# Patient Record
Sex: FEMALE | Race: WHITE | NOT HISPANIC OR LATINO | Employment: FULL TIME | URBAN - METROPOLITAN AREA
[De-identification: names, ages, dates, MRNs, and addresses within clinical notes are randomized per-mention and may not be internally consistent; named-entity substitution may affect disease eponyms.]

---

## 2017-09-25 ENCOUNTER — HOSPITAL ENCOUNTER (EMERGENCY)
Facility: HOSPITAL | Age: 27
Discharge: HOME | End: 2017-09-25
Attending: EMERGENCY MEDICINE | Admitting: EMERGENCY MEDICINE

## 2017-09-25 ENCOUNTER — APPOINTMENT (OUTPATIENT)
Dept: GENERAL RADIOLOGY | Facility: HOSPITAL | Age: 27
End: 2017-09-25

## 2017-09-25 VITALS
HEIGHT: 68 IN | RESPIRATION RATE: 18 BRPM | SYSTOLIC BLOOD PRESSURE: 131 MMHG | DIASTOLIC BLOOD PRESSURE: 79 MMHG | BODY MASS INDEX: 28.79 KG/M2 | WEIGHT: 190 LBS | HEART RATE: 89 BPM | OXYGEN SATURATION: 97 % | TEMPERATURE: 98.2 F

## 2017-09-25 DIAGNOSIS — M72.2 PLANTAR FASCIITIS OF LEFT FOOT: Primary | ICD-10-CM

## 2017-09-25 DIAGNOSIS — M77.50 TENDINITIS OF ANKLE OR FOOT: ICD-10-CM

## 2017-09-25 PROCEDURE — 73630 X-RAY EXAM OF FOOT: CPT

## 2017-09-25 PROCEDURE — 73610 X-RAY EXAM OF ANKLE: CPT

## 2017-09-25 PROCEDURE — 99283 EMERGENCY DEPT VISIT LOW MDM: CPT

## 2017-09-25 RX ORDER — DICLOFENAC POTASSIUM 50 MG/1
50 TABLET, FILM COATED ORAL 3 TIMES DAILY
Qty: 15 TABLET | Refills: 0 | Status: SHIPPED | OUTPATIENT
Start: 2017-09-25 | End: 2019-08-02

## 2017-09-25 NOTE — ED PROVIDER NOTES
Subjective   Patient is a 27 y.o. female presenting with lower extremity pain.   History provided by:  Patient   used: No    Lower Extremity Issue   Location:  Foot and ankle  Lower extremity injury: months ago     Ankle location:  L ankle  Foot location:  L foot and dorsum of L foot  Pain details:     Quality:  Sharp and shooting    Radiates to:  Does not radiate    Severity:  Moderate    Onset quality:  Gradual    Timing:  Intermittent    Progression:  Waxing and waning  Chronicity:  Chronic  Dislocation: no    Tetanus status:  Up to date  Prior injury to area:  Yes  Relieved by:  Compression and rest  Exacerbated by: pain worse when first up and then loosens up.  Associated symptoms: decreased ROM, stiffness and swelling    Associated symptoms: no back pain, no fever, no neck pain and no numbness        Review of Systems   Constitutional: Negative for chills and fever.   Respiratory: Negative for chest tightness, shortness of breath and wheezing.    Cardiovascular: Negative for chest pain and palpitations.   Musculoskeletal: Positive for stiffness. Negative for back pain and neck pain.   Skin: Negative for pallor and rash.   Psychiatric/Behavioral: Negative.    All other systems reviewed and are negative.      History reviewed. No pertinent past medical history.    No Known Allergies    Past Surgical History:   Procedure Laterality Date   • DENTAL PROCEDURE         History reviewed. No pertinent family history.    Social History     Social History   • Marital status: Single     Spouse name: N/A   • Number of children: N/A   • Years of education: N/A     Social History Main Topics   • Smoking status: Never Smoker   • Smokeless tobacco: Never Used   • Alcohol use No   • Drug use: No   • Sexual activity: Not Asked     Other Topics Concern   • None     Social History Narrative   • None           Objective   Physical Exam   Constitutional: She is oriented to person, place, and time. She appears  well-developed and well-nourished.   HENT:   Head: Normocephalic and atraumatic.   Musculoskeletal:   Tender over the plantar fascia.  Also tender over the dorsal aspect of the foot and the medial ankle.  No ecchymosis redness or abrasions.   Neurological: She is alert and oriented to person, place, and time. She has normal reflexes.   Skin: Skin is warm and dry.   Psychiatric: She has a normal mood and affect. Her behavior is normal. Judgment and thought content normal.   Nursing note and vitals reviewed.      Procedures         ED Course  ED Course                  MDM  Number of Diagnoses or Management Options  new and requires workup  new and requires workup     Amount and/or Complexity of Data Reviewed  Tests in the radiology section of CPT®: reviewed and ordered  Discuss the patient with other providers: yes  Independent visualization of images, tracings, or specimens: yes    Patient Progress  Patient progress: stable      Final diagnoses:   Plantar fasciitis of left foot   Tendinitis of ankle or foot            HTI Haque  09/25/17 [id number]

## 2018-07-23 PROBLEM — M25.511 ACUTE PAIN OF RIGHT SHOULDER: Status: ACTIVE | Noted: 2018-07-23

## 2018-07-23 PROBLEM — S62.639B OPEN FRACTURE OF TUFT OF DISTAL PHALANX OF FINGER: Status: ACTIVE | Noted: 2018-07-23

## 2019-08-02 ENCOUNTER — OFFICE VISIT (OUTPATIENT)
Dept: GASTROENTEROLOGY | Facility: CLINIC | Age: 29
End: 2019-08-02

## 2019-08-02 VITALS
SYSTOLIC BLOOD PRESSURE: 110 MMHG | DIASTOLIC BLOOD PRESSURE: 78 MMHG | HEIGHT: 68 IN | OXYGEN SATURATION: 99 % | BODY MASS INDEX: 41.22 KG/M2 | RESPIRATION RATE: 16 BRPM | HEART RATE: 103 BPM | TEMPERATURE: 98.1 F | WEIGHT: 272 LBS

## 2019-08-02 DIAGNOSIS — K29.50 OTHER CHRONIC GASTRITIS WITHOUT HEMORRHAGE: ICD-10-CM

## 2019-08-02 DIAGNOSIS — Z79.1 ENCOUNTER FOR LONG-TERM (CURRENT) USE OF NSAIDS: ICD-10-CM

## 2019-08-02 DIAGNOSIS — R10.12 LUQ PAIN: ICD-10-CM

## 2019-08-02 DIAGNOSIS — E66.01 OBESITY, CLASS III, BMI 40-49.9 (MORBID OBESITY) (HCC): ICD-10-CM

## 2019-08-02 DIAGNOSIS — R11.0 NAUSEA: Primary | ICD-10-CM

## 2019-08-02 DIAGNOSIS — R63.5 WEIGHT GAIN, ABNORMAL: ICD-10-CM

## 2019-08-02 PROCEDURE — 99214 OFFICE O/P EST MOD 30 MIN: CPT | Performed by: NURSE PRACTITIONER

## 2019-08-02 RX ORDER — PANTOPRAZOLE SODIUM 40 MG/1
40 TABLET, DELAYED RELEASE ORAL EVERY MORNING
Qty: 30 TABLET | Refills: 2 | Status: SHIPPED | OUTPATIENT
Start: 2019-08-02 | End: 2020-06-12

## 2019-08-02 RX ORDER — ONDANSETRON 4 MG/1
4 TABLET, ORALLY DISINTEGRATING ORAL 4 TIMES DAILY PRN
Qty: 30 TABLET | Refills: 1 | Status: SHIPPED | OUTPATIENT
Start: 2019-08-02 | End: 2020-06-12

## 2019-08-02 RX ORDER — HYDROXYZINE HYDROCHLORIDE 10 MG/1
10 TABLET, FILM COATED ORAL 3 TIMES DAILY PRN
COMMUNITY
End: 2020-06-12

## 2019-08-02 RX ORDER — CITALOPRAM 10 MG/1
10 TABLET ORAL DAILY
COMMUNITY
End: 2020-06-12

## 2019-08-02 NOTE — PATIENT INSTRUCTIONS
Avoid NSAIDs (Non-Steroid Anti-Inflammatory Drugs) products. Some examples of these medications are  · Ibuprofen (Advil, Midol, Motrin)  · Naproxen (Aleve)  · BC Powder  · Exedrin  · Diclofenac (voltaren)  · Indocin (indomethacin)  · Mobic (meloxicam)    Talk to your doctor/medical provider for alternative approach for pain management such as physical therapy, exercises, muscle relaxants, topical lidocaine patch (i.e [place]) etc. You can take acetaminophen (i.e. Tylenol) for pain, but at most 4-5 tablets (325 mg tablet) a day.     Take NSAIDS only as directed by your doctor or medical provider.     Should you need to continue any NSAID products on a regular basis, please let me know so I can advise you taking medications to protect your stomach from having ulcer.      Be be aware of long-term and frequent NSAIDS' potential side effects. These include, but limited to, stomach ulcer, bleeding risks, and kidney injury.     Take the medication with food.    Call me if you have vomiting, abdominal pain, or black/bloody stools.

## 2019-08-02 NOTE — PROGRESS NOTES
GASTROENTEROLOGY OUTPATIENT NEW PATIENT NOTE  Patient: DEBI CONWAY : 1990  Date of Service: 2019  Dear Bella Giraldo APRN   Thank you for your referral of this patient for evaluation of abd pain  CC: Abdominal Pain   Assessment/Plan                                             ASSESSMENT & PLANS     LUQ pain  Chronic gastritis without hemorrhage  -     H. Pylori Breath Test - Breath, Lung; Future  -     Start pantoprazole (PROTONIX) 40 MG EC tablet; Take 1 tablet by mouth Every Morning. Take first thing in the morning on an empty stomach.  Wait at least 30 min to 1hr before eating.  Anti-reflux measures: Avoiding lying down immediately after eating (wait at least 3 hours after meals), elevate the head of the bed at night, avoid spicy foods, avoid mints, avoid caffeine, avoid nicotine and work on getting to a healthy weight.  · EGD if sx persists despite medical and dietary mgt    Nausea  -     Start ondansetron ODT (ZOFRAN-ODT) 4 MG disintegrating tablet; Place 1 tablet under the tongue 4 (Four) Times a Day As Needed for Nausea or Vomiting.    Encounter for long-term (current) use of NSAIDs  -     Pt is counseled on avoiding NSAIDS products. I encouraged pt to discuss with PCP to seek alternative approach for mgt such as physical therapy, exercises, muscle relaxants, etc.  Pt is given precaution should pt continue any NSAID product.  Pt was advised that NSAIDS' potential side effects include, but limited to, gastrointestinal irritation including bleeding, thrombocytopenia, and kidney injuries. Pt was asked to take the medication with food and to stop if pt experiences any GI upset. Pt is to call me if experiencing vomit, abdominal pain, or black/bloody stools.     Weight gain, abnormal  Obesity, Class III, BMI 40-49.9 (morbid obesity) (CMS/[facility])  · Pt is educated on BMI and obesity.  Pt is encouraged of weight control as weight gain and obesity can worsen GI sx and cause other health problems  (fatty liver, heart disease, diabetes, etc).    Follow Up:Return in about 1 month (around 9/2/2019) for Recheck.      DISCUSSION: The above plan was delineated in details with patient and all questions and concerns were answered.  Patient is also given contact information.  Patient is to return as scheduled or sooner if new problems arise  Subjective                                                     SUBJECTIVE   History of Present Illness  Ms. Hoa Lewis is a 28 y.o. female who presents to the clinic today for an evaluation of Abdominal Pain    Onset June 2019. Intermittent, occurring daily      Location LUQ abd-- non-radiating   Triggers  Worsens by None.sometimes eating.  Bending or stooping   Severity/Quality/  Frequency Pressure   Relieving factors  Time    Associating Sx  + and - ++nausea w/o vomiting    BM once every couple days. [place] 4. Pt denies dark black stools or bright red blood in the stools, in the toilet bowl, or on the toilet tissue.   Prior Dx workup CT and Xray  Blood test WNL     Pertinent Hx Drinks about 4-5 Pepsi a day.  Has tried to cut back     Risk factors  Pt  reports that she has never smoked. She has never used smokeless tobacco.  Very seldom ETOH.  Body mass index is 41.36 kg/m².  Gain about 60 lbs w/in a year  D/t abnormal thyroid and being less active  Meloxicam for foot pain and surgery since October      Prior Tx Hot rag.  Rest  Tried Miralax for constipation w/o abd pain sx relief  Has not tried any PPI or [id number]     ROS:Review of Systems   Gastrointestinal: Positive for abdominal pain (LUQ).   All other systems reviewed and are negative.    Subjective   PAST MED HX: Pt  has no past medical history on file.  PAST SURG HX: Pt  has a past surgical history that includes Dental surgery.  FAM HX: family history is not on file.  SOC HX: Pt  reports that she has never smoked. She has never used smokeless tobacco. She reports that she does not drink alcohol or use drugs. 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  MEDS: •  citalopram (CeleXA) 10 MG tablet, Take 10 mg by mouth Daily., Disp: , Rfl:   •  hydrOXYzine (ATARAX) 10 MG tablet, Take 10 mg by mouth 3 (Three) Times a Day As Needed for Itching., Disp: , Rfl:   •  meloxicam (MOBIC) 15 MG tablet, Take 15 mg by mouth Daily., Disp: , Rfl:     Wt Readings from Last 5 Encounters:   08/02/19 123 kg (272 lb)   07/23/18 97.5 kg (215 lb)   09/25/17 86.2 kg (190 lb)   body mass index is 41.36 kg/m².,Temp: 98.1 °F (36.7 °C),BP: 110/78,Heart Rate: 103   Physical Exam  General Well developed; well nourished. NAD   ENT Anicteric sclerae. Oral mucosa pink and moist without thrush or lesions    Neck Neck supple; trachea midline. No thyromegaly   Resp CTA. Respiration effort normal  CV RRR. No M/R/G. No lower extremity edema  GI Abd soft, NT, ND, normal active bowel sounds.  No HSM.  No abd hernia  Musc No clubbing; no cyanosis.  Gait steady  Skin No rash; no lesions; no bruises.  Skin warm to touch.  Psych Oriented to time, place, and person.  Appropriate affect    Thank you kindly for allowing me to be part of this patient’s care.    Pt care team: Glenda EARLY & Belén Torres MA  08/02/19 10:06 AM  [facility]--Gastroenterology  [phone]    CC: Bella Giraldo, SHARATH  [address] / [place] [address] FAX:[phone]

## 2019-08-05 ENCOUNTER — TELEPHONE (OUTPATIENT)
Dept: GASTROENTEROLOGY | Facility: CLINIC | Age: 29
End: 2019-08-05

## 2019-08-05 LAB — UREA BREATH TEST QL: NEGATIVE

## 2019-08-05 NOTE — TELEPHONE ENCOUNTER
CALLED PATIENT BACK. NO ANSWER; LEFT VOICE MESSAGE. [employer] DOESN'T FILL OUT FMLA PAPERS. PATIENT MAY GET FORMS FILL OUT BY PCP. PAPERS LEFT AT THE .

## 2019-10-08 ENCOUNTER — OFFICE VISIT (OUTPATIENT)
Dept: GASTROENTEROLOGY | Facility: CLINIC | Age: 29
End: 2019-10-08

## 2019-10-08 ENCOUNTER — LAB REQUISITION (OUTPATIENT)
Dept: LAB | Facility: HOSPITAL | Age: 29
End: 2019-10-08

## 2019-10-08 VITALS
SYSTOLIC BLOOD PRESSURE: 130 MMHG | OXYGEN SATURATION: 98 % | BODY MASS INDEX: 40.25 KG/M2 | WEIGHT: 265.6 LBS | TEMPERATURE: 97.3 F | RESPIRATION RATE: 16 BRPM | DIASTOLIC BLOOD PRESSURE: 92 MMHG | HEIGHT: 68 IN | HEART RATE: 106 BPM

## 2019-10-08 DIAGNOSIS — Z00.00 ROUTINE GENERAL MEDICAL EXAMINATION AT A HEALTH CARE FACILITY: ICD-10-CM

## 2019-10-08 DIAGNOSIS — R11.0 NAUSEA: ICD-10-CM

## 2019-10-08 DIAGNOSIS — E66.01 OBESITY, CLASS III, BMI 40-49.9 (MORBID OBESITY) (HCC): ICD-10-CM

## 2019-10-08 DIAGNOSIS — R10.11 RUQ ABDOMINAL PAIN: Primary | ICD-10-CM

## 2019-10-08 DIAGNOSIS — Z79.1 ENCOUNTER FOR LONG-TERM (CURRENT) USE OF NSAIDS: ICD-10-CM

## 2019-10-08 PROCEDURE — 36415 COLL VENOUS BLD VENIPUNCTURE: CPT | Performed by: NURSE PRACTITIONER

## 2019-10-08 PROCEDURE — 99213 OFFICE O/P EST LOW 20 MIN: CPT | Performed by: NURSE PRACTITIONER

## 2019-10-08 NOTE — PROGRESS NOTES
GASTROENTEROLOGY OUTPATIENT ESTABLISHED PATIENT NOTE  Patient: DEBI CONWAY : 1990  Date of Service: 10/08/2019  CC: abd pain and nausea  \  Assessment/Plan                                             ASSESSMENT & PLANS     RUQ abdominal pain New onset.  Possibly r/t biliary cause  Recent LUQ pain has improved w/ PPI  Nausea  -     NM HIDA Scan With Pharmacological Intervention; Future  -     Lipase; Future  -     Comprehensive Metabolic Panel; Future  · Continue Protonix daily  · EGD if sx persists/worsens and HIDA WNL  · Low fat diet    Encounter for long-term (current) use of NSAIDs  · Pt is counseled on judicious use of Meloxicam if possible.    Obesity, Class III, BMI 40-49.9 (morbid obesity) (CMS/[facility])  Pt is encouraged on watching her wt    Follow Up:Return in about 3 months (around 2020) for Recheck.      DISCUSSION: The above plan was delineated in details with patient and all questions and concerns were answered.  Patient is also given contact information.  Patient is to return as scheduled or sooner if new problems arise.   Subjective                                                     SUBJECTIVE   History of Present Illness  Ms. Debi Conway is a 29 y.o. female who is here for Follow-up recent new pt eval for LUQ abd pain and nausea. H-pylori breath test was negative. Pt was started on Protonix in Aug 2019.    She reports that she has at least 50%--60% improvement on LUQ, especially abd pressure.  However, now she has intermittent RUQ soreness. Not specifically postprandial or certain food.  Since last clinic visit, pt has tried diet changes and cut back on Meloxicam. No change in bowel habit.    Work up done in /July (since onset of abd pain) at PCP w/ Xray, CT, and blood test.  I don't have those results.  Reportedly per pt CT showed gallstones.  No known abnormal blood results. Pt was referred to Dr Lim. CCy was not recommended at that the time d/t pain characteristic (LUQ  location and not postprandial).      ROS:Review of Systems   Constitutional: Negative.         Pt currently or recently takes NSAIDS ( (i.e Ibuprofen, Aleve, Advil, Exedrin, BC Powder, diclofenac, meloxicam, & Naproxen, etc)? No    Pt currently or recently takes abx No   HENT: Negative.    Cardiovascular: Negative.    Gastrointestinal: Positive for abdominal pain and nausea.   All other systems reviewed and are negative.    Objective                                                           OBJECTIVE   Allergy: Pt has No Known Allergies.  MEDS:•  citalopram (CeleXA) 10 MG tablet, Take 10 mg by mouth Daily., Disp: , Rfl:   •  hydrOXYzine (ATARAX) 10 MG tablet, Take 10 mg by mouth 3 (Three) Times a Day As Needed for Itching., Disp: , Rfl:   •  meloxicam (MOBIC) 15 MG tablet, Take 15 mg by mouth Daily., Disp: , Rfl:   •  ondansetron ODT (ZOFRAN-ODT) 4 MG disintegrating tablet, Place 1 tablet under the tongue 4 (Four) Times a Day As Needed for Nausea or Vomiting., Disp: 30 tablet, Rfl: 1  •  pantoprazole (PROTONIX) 40 MG EC tablet, Take 1 tablet by mouth Every Morning. Take first thing in the morning on an empty stomach.  Wait at least 30 min to 1hr before eating., Disp: 30 tablet, Rfl: 2    Lab Results   Component Value Date    HPYLORIBR Negative 08/02/2019     Wt Readings from Last 5 Encounters:   10/08/19 120 kg (265 lb 9.6 oz)   08/02/19 123 kg (272 lb)   07/23/18 97.5 kg (215 lb)   09/25/17 86.2 kg (190 lb)   body mass index is 40.38 kg/m².,Temp: 97.3 °F (36.3 °C),BP: 130/92,Heart Rate: 106   Physical Exam  General Well developed; well nourished. NAD  ENT Anicteric sclerae. Oral mucosa pink and moist without thrush or lesions    GI Abd soft, NT, ND, normal active bowel sounds.  Morbidly obese. No abd hernia    Pt care team: Glenda EARLY & Belén Torres MA  10/08/19 11:12 AM  [facility]--Gastroenterology  [phone]    CC: Bella Giraldo, APRN   [address]  / [place]  [place] [address] FAX:[phone]

## 2019-10-08 NOTE — PATIENT INSTRUCTIONS
Gallbladder Nuclear Scan    A gallbladder nuclear scan (hepatobiliary scan or HIDA scan) is an imaging test that checks the function of your liver, gallbladder, and the ducts of the liver and gallbladder. These parts make up the hepatobiliary system. You may need this scan if you have symptoms of liver or gallbladder disease.  For this exam, a radioactive substance (radiotracer) will be injected into your bloodstream through an IV. As the radiotracer moves through your hepatobiliary system, a camera will detect the energy that the radiotracer gives off. The camera will produce images that show how well your hepatobiliary system is functioning.  Tell a health care provider about:  · Any allergies you have.  · All medicines you are taking, including vitamins, herbs, eye drops, creams, and over-the-counter medicines.  · Any problems you or your family members have had with anesthetic medicines.  · Any blood disorders you have.  · Any surgeries you have had.  · Any medical conditions you have.  · Whether you are pregnant, may be pregnant, or are breastfeeding.  What are the risks?  · Getting a gallbladder nuclear scan is a safe procedure. However, you will be exposed to a small amount of radiation.  · There is a risk of an allergic reaction to medicines used during the test.  What happens before the procedure?  General instructions  · Follow instructions from your health care provider about eating or drinking restrictions. You may not be able to eat or drink anything for 4 hours before the test.  · Ask your health care provider about changing or stopping your regular medicines.  What happens during the procedure?    · You will lie down on your back.  · An IV will be inserted into a vein in your hand or arm.  · Radiotracer will be injected through your IV. You may feel a cold sensation as this happens.  · A camera (gamma camera) will be moved over your abdomen.  · Images will be taken. The camera may rotate around your  body. You may be asked to stay still or move into a certain position.  · You may be given a medicine (cholecystokinin, CCK) through your IV. This medicine will make your gallbladder empty. You may feel some nausea or have cramping for a short time.  · More images will be taken.  · After all images have been taken, your IV will be removed.  The procedure may vary among health care providers and hospitals.  What happens after the procedure?  · You should drink plenty of fluids to help your body get rid of the radiotracer.  · It is up to you to get your test results. Ask your health care provider, or the department that is doing the test, when your results will be ready.  Summary  · A gallbladder nuclear scan (hepatobiliary scan or HIDA scan) is an imaging test that checks the function of your liver, gallbladder, and the ducts of the liver and gallbladder.  · You may need this scan if you have symptoms of liver or gallbladder disease.  · For this exam, a radioactive substance (radiotracer) will be injected into your bloodstream through an IV.  · The camera will produce images that show how well your hepatobiliary system is functioning.  This information is not intended to replace advice given to you by your health care provider. Make sure you discuss any questions you have with your health care provider.  Document Released: 12/15/2001 Document Revised: 12/28/2018 Document Reviewed: 12/28/2018  [employer] Interactive Patient Education © 2019 [employer]    Gallbladder Eating Plan  If you have a gallbladder condition, you may have trouble digesting fats. Eating a low-fat diet can help reduce your symptoms, and may be helpful before and after having surgery to remove your gallbladder (cholecystectomy). Your health care provider may recommend that you work with a diet and nutrition specialist (dietitian) to help you reduce the amount of fat in your diet.  What are tips for following this plan?  General guidelines  · Limit  your fat intake to less than 30% of your total daily calories. If you eat around 1,800 calories each day, this is less than 60 grams (g) of fat per day.  · Fat is an important part of a healthy diet. Eating a low-fat diet can make it hard to maintain a healthy body weight. Ask your dietitian how much fat, calories, and other nutrients you need each day.  · Eat small, frequent meals throughout the day instead of three large meals.  · Drink at least 8-10 cups of fluid a day. Drink enough fluid to keep your urine clear or pale yellow.  · Limit alcohol intake to no more than 1 drink a day for nonpregnant women and 2 drinks a day for men. One drink equals 12 oz of beer, 5 oz of wine, or 1½ oz of hard liquor.  Reading food labels  · Check Nutrition Facts on food labels for the amount of fat per serving. Choose foods with less than 3 grams of fat per serving.  Shopping  · Choose nonfat and low-fat healthy foods. Look for the words “nonfat,” “low fat,” or “fat free.”  · Avoid buying processed or prepackaged foods.  Cooking  · Cook using low-fat methods, such as baking, broiling, grilling, or boiling.  · Cook with small amounts of healthy fats, such as olive oil, grapeseed oil, canola oil, or sunflower oil.  What foods are recommended?    · All fresh, frozen, or canned fruits and vegetables.  · Whole grains.  · Low-fat or non-fat (skim) milk and yogurt.  · Lean meat, skinless poultry, fish, eggs, and beans.  · Low-fat protein supplement powders or drinks.  · Spices and herbs.  What foods are not recommended?  · High-fat foods. These include baked goods, fast food, fatty cuts of meat, ice cream, french toast, sweet rolls, pizza, cheese bread, foods covered with butter, creamy sauces, or cheese.  · Fried foods. These include french fries, tempura, battered fish, breaded chicken, fried breads, and sweets.  · Foods with strong odors.  · Foods that cause bloating and gas.  Summary  · A low-fat diet can be helpful if you have a  gallbladder condition, or before and after gallbladder surgery.  · Limit your fat intake to less than 30% of your total daily calories. This is about 60 g of fat if you eat 1,800 calories each day.  · Eat small, frequent meals throughout the day instead of three large meals.  This information is not intended to replace advice given to you by your health care provider. Make sure you discuss any questions you have with your health care provider.  Document Released: 12/23/2014 Document Revised: 01/25/2018 Document Reviewed: 01/25/2018  [employer] Interactive Patient Education © 2019 [employer]    Cholecystitis    Cholecystitis is inflammation of the gallbladder. It is often called a gallbladder attack. The gallbladder is a pear-shaped organ that lies beneath the liver on the right side of the body. The gallbladder stores bile, which is a fluid that helps the body to digest fats. If bile builds up in your gallbladder, your gallbladder becomes inflamed. This condition may occur suddenly (be acute). Repeat episodes of acute cholecystitis or prolonged episodes may lead to a long-term (chronic) condition. Cholecystitis is serious and it requires treatment.  What are the causes?  The most common cause of this condition is gallstones. Gallstones can block the tube (duct) that carries bile out of your gallbladder. This causes bile to build up. Other causes of this condition include:  · Damage to the gallbladder due to a decrease in blood flow.  · Infections in the bile ducts.  · Scars or kinks in the bile ducts.  · Tumors in the liver, pancreas, or gallbladder.  What increases the risk?  This condition is more likely to develop in:  · People who have sickle cell disease.  · People who take birth control pills or use estrogen.  · People who have alcoholic liver disease.  · People who have liver cirrhosis.  · People who have their nutrition delivered through a vein (parenteral nutrition).  · People who do not eat or drink (do  fasting) for a long period of time.  · People who are obese.  · People who have rapid weight loss.  · People who are pregnant.  · People who have increased triglyceride levels.  · People who have pancreatitis.  What are the signs or symptoms?  Symptoms of this condition include:  · Abdominal pain, especially in the upper right area of the abdomen.  · Abdominal tenderness or bloating.  · Nausea.  · Vomiting.  · Fever.  · Chills.  · Yellowing of the skin and the whites of the eyes (jaundice).  How is this diagnosed?  This condition is diagnosed with a medical history and physical exam. You may also have other tests, including:  · Imaging tests, such as:  ? An ultrasound of the gallbladder.  ? A CT scan of the abdomen.  ? A gallbladder nuclear scan (HIDA scan). This scan allows your health care provider to see the bile moving from your liver to your gallbladder and to your small intestine.  ? MRI.  · Blood tests, such as:  ? A complete blood count, because the white blood cell count may be higher than normal.  ? Liver function tests, because some levels may be higher than normal with certain types of gallstones.  How is this treated?  Treatment may include:  · Fasting for a certain amount of time.  · IV fluids.  · Medicine to treat pain or vomiting.  · Antibiotic medicine.  · Surgery to remove your gallbladder (cholecystectomy). This may happen immediately or at a later time.  Follow these instructions at home:  Home care will depend on your treatment. In general:  · Take over-the-counter and prescription medicines only as told by your health care provider.  · If you were prescribed an antibiotic medicine, take it as told by your health care provider. Do not stop taking the antibiotic even if you start to feel better.  · Follow instructions from your health care provider about what to eat or drink. When you are allowed to eat, avoid eating or drinking anything that triggers your symptoms.  · Keep all follow-up visits  as told by your health care provider. This is important.  Contact a health care provider if:  · Your pain is not controlled with medicine.  · You have a fever.  Get help right away if:  · Your pain moves to another part of your abdomen or to your back.  · You continue to have symptoms or you develop new symptoms even with treatment.  This information is not intended to replace advice given to you by your health care provider. Make sure you discuss any questions you have with your health care provider.  Document Released: 12/18/2006 Document Revised: 04/27/2017 Document Reviewed: 03/30/2016  [employer] Interactive Patient Education © 2019 [employer]

## 2019-10-09 LAB
ALBUMIN SERPL-MCNC: 4.7 G/DL (ref 3.5–5.2)
ALBUMIN/GLOB SERPL: 1.7 G/DL
ALP SERPL-CCNC: 80 U/L (ref 39–117)
ALT SERPL-CCNC: 15 U/L (ref 1–33)
AST SERPL-CCNC: 14 U/L (ref 1–32)
BILIRUB SERPL-MCNC: 0.6 MG/DL (ref 0.2–1.2)
BUN SERPL-MCNC: 12 MG/DL (ref 6–20)
BUN/CREAT SERPL: 17.1 (ref 7–25)
CALCIUM SERPL-MCNC: 9.3 MG/DL (ref 8.6–10.5)
CHLORIDE SERPL-SCNC: 102 MMOL/L (ref 98–107)
CO2 SERPL-SCNC: 25.4 MMOL/L (ref 22–29)
CREAT SERPL-MCNC: 0.7 MG/DL (ref 0.57–1)
GLOBULIN SER CALC-MCNC: 2.8 GM/DL
GLUCOSE SERPL-MCNC: 95 MG/DL (ref 65–99)
LIPASE SERPL-CCNC: 31 U/L (ref 13–60)
POTASSIUM SERPL-SCNC: 3.8 MMOL/L (ref 3.5–5.2)
PROT SERPL-MCNC: 7.5 G/DL (ref 6–8.5)
SODIUM SERPL-SCNC: 140 MMOL/L (ref 136–145)

## 2019-11-08 ENCOUNTER — HOSPITAL ENCOUNTER (OUTPATIENT)
Dept: NUCLEAR MEDICINE | Facility: HOSPITAL | Age: 29
Discharge: HOME | End: 2019-11-08

## 2019-11-08 DIAGNOSIS — R10.11 RUQ ABDOMINAL PAIN: ICD-10-CM

## 2019-11-08 DIAGNOSIS — R11.0 NAUSEA: ICD-10-CM

## 2019-11-08 PROCEDURE — 78227 HEPATOBIL SYST IMAGE W/DRUG: CPT

## 2019-11-08 PROCEDURE — 0 TECHNETIUM TC 99M MEBROFENIN KIT: Performed by: NURSE PRACTITIONER

## 2019-11-08 PROCEDURE — A9537 TC99M MEBROFENIN: HCPCS | Performed by: NURSE PRACTITIONER

## 2019-11-08 PROCEDURE — 25010000002 SINCALIDE PER 5 MCG: Performed by: NURSE PRACTITIONER

## 2019-11-08 RX ORDER — KIT FOR THE PREPARATION OF TECHNETIUM TC 99M MEBROFENIN 45 MG/10ML
1 INJECTION, POWDER, LYOPHILIZED, FOR SOLUTION INTRAVENOUS
Status: COMPLETED | OUTPATIENT
Start: 2019-11-08 | End: 2019-11-08

## 2019-11-08 RX ADMIN — SINCALIDE 2.4 MCG: 5 INJECTION, POWDER, LYOPHILIZED, FOR SOLUTION INTRAVENOUS at 09:25

## 2019-11-08 RX ADMIN — MEBROFENIN 1 DOSE: 45 INJECTION, POWDER, LYOPHILIZED, FOR SOLUTION INTRAVENOUS at 08:30

## 2019-11-13 ENCOUNTER — TELEPHONE (OUTPATIENT)
Dept: GASTROENTEROLOGY | Facility: CLINIC | Age: 29
End: 2019-11-13

## 2019-11-13 DIAGNOSIS — R10.11 RUQ ABDOMINAL PAIN: Primary | ICD-10-CM

## 2019-11-13 DIAGNOSIS — R10.12 LUQ PAIN: ICD-10-CM

## 2019-11-13 DIAGNOSIS — K80.20 GALLSTONES: ICD-10-CM

## 2019-11-13 DIAGNOSIS — R11.0 NAUSEA: ICD-10-CM

## 2019-11-13 NOTE — TELEPHONE ENCOUNTER
Xenia  Please let patient know that HIDA scan result is within normal limit.    I recommend EGD to further eval pt's abd pain

## 2019-11-14 ENCOUNTER — OUTSIDE FACILITY SERVICE (OUTPATIENT)
Dept: GASTROENTEROLOGY | Facility: CLINIC | Age: 29
End: 2019-11-14

## 2019-11-14 ENCOUNTER — LAB REQUISITION (OUTPATIENT)
Dept: LAB | Facility: HOSPITAL | Age: 29
End: 2019-11-14

## 2019-11-14 DIAGNOSIS — R10.10 UPPER ABDOMINAL PAIN, UNSPECIFIED: ICD-10-CM

## 2019-11-14 DIAGNOSIS — R10.84 GENERALIZED ABDOMINAL PAIN: ICD-10-CM

## 2019-11-14 PROCEDURE — 88305 TISSUE EXAM BY PATHOLOGIST: CPT | Performed by: INTERNAL MEDICINE

## 2019-11-14 PROCEDURE — 43239 EGD BIOPSY SINGLE/MULTIPLE: CPT | Performed by: INTERNAL MEDICINE

## 2019-11-15 LAB
CYTO UR: NORMAL
LAB AP CASE REPORT: NORMAL
LAB AP CLINICAL INFORMATION: NORMAL
PATH REPORT.FINAL DX SPEC: NORMAL
PATH REPORT.GROSS SPEC: NORMAL

## 2020-02-13 ENCOUNTER — TELEPHONE (OUTPATIENT)
Dept: GASTROENTEROLOGY | Facility: CLINIC | Age: 30
End: 2020-02-13

## 2020-02-13 NOTE — TELEPHONE ENCOUNTER
I called patient regarding no show appointment on 2/11/2020 with Glenda. No answer; left voice message.

## 2020-06-12 ENCOUNTER — LAB (OUTPATIENT)
Dept: LAB | Facility: HOSPITAL | Age: 30
End: 2020-06-12

## 2020-06-12 ENCOUNTER — OFFICE VISIT (OUTPATIENT)
Dept: INTERNAL MEDICINE | Facility: CLINIC | Age: 30
End: 2020-06-12

## 2020-06-12 VITALS
RESPIRATION RATE: 16 BRPM | OXYGEN SATURATION: 100 % | SYSTOLIC BLOOD PRESSURE: 124 MMHG | TEMPERATURE: 97.8 F | HEIGHT: 68 IN | HEART RATE: 94 BPM | DIASTOLIC BLOOD PRESSURE: 70 MMHG | WEIGHT: 287.6 LBS | BODY MASS INDEX: 43.59 KG/M2

## 2020-06-12 DIAGNOSIS — G89.29 CHRONIC NONINTRACTABLE HEADACHE, UNSPECIFIED HEADACHE TYPE: Primary | ICD-10-CM

## 2020-06-12 DIAGNOSIS — F41.9 ANXIETY: ICD-10-CM

## 2020-06-12 DIAGNOSIS — Z83.49: ICD-10-CM

## 2020-06-12 DIAGNOSIS — N92.0 MENORRHAGIA WITH REGULAR CYCLE: ICD-10-CM

## 2020-06-12 DIAGNOSIS — G89.29 CHRONIC NONINTRACTABLE HEADACHE, UNSPECIFIED HEADACHE TYPE: ICD-10-CM

## 2020-06-12 DIAGNOSIS — M25.50 ARTHRALGIA, UNSPECIFIED JOINT: ICD-10-CM

## 2020-06-12 DIAGNOSIS — R51.9 CHRONIC NONINTRACTABLE HEADACHE, UNSPECIFIED HEADACHE TYPE: Primary | ICD-10-CM

## 2020-06-12 DIAGNOSIS — R51.9 CHRONIC NONINTRACTABLE HEADACHE, UNSPECIFIED HEADACHE TYPE: ICD-10-CM

## 2020-06-12 DIAGNOSIS — F33.1 MODERATE EPISODE OF RECURRENT MAJOR DEPRESSIVE DISORDER (HCC): ICD-10-CM

## 2020-06-12 PROCEDURE — 99203 OFFICE O/P NEW LOW 30 MIN: CPT | Performed by: NURSE PRACTITIONER

## 2020-06-12 RX ORDER — NORGESTIMATE AND ETHINYL ESTRADIOL 7DAYSX3 LO
1 KIT ORAL DAILY
Qty: 28 TABLET | Refills: 12 | Status: SHIPPED | OUTPATIENT
Start: 2020-06-12 | End: 2020-07-07 | Stop reason: SDUPTHER

## 2020-06-12 RX ORDER — NAPROXEN 500 MG/1
500 TABLET ORAL 2 TIMES DAILY PRN
Qty: 60 TABLET | Refills: 2 | Status: SHIPPED | OUTPATIENT
Start: 2020-06-12 | End: 2020-07-02

## 2020-06-12 RX ORDER — CITALOPRAM 10 MG/1
10 TABLET ORAL DAILY
Qty: 30 TABLET | Refills: 2 | Status: SHIPPED | OUTPATIENT
Start: 2020-06-12 | End: 2020-07-07 | Stop reason: SDUPTHER

## 2020-06-12 RX ORDER — HYDROXYZINE HYDROCHLORIDE 10 MG/1
10 TABLET, FILM COATED ORAL 3 TIMES DAILY PRN
Qty: 90 TABLET | Refills: 2 | Status: SHIPPED | OUTPATIENT
Start: 2020-06-12 | End: 2022-03-17

## 2020-06-12 NOTE — PROGRESS NOTES
Subjective   Hoa Lewis is a 29 y.o. female    Chief Complaint   Patient presents with   • Establish Care   • Possibly symptoms of acromegaly     Has a lot of headaches, fatigue and joint pain. Would like a work up for further evaluation of sx's. Due to mothers history with acromegaly.      New pt here to establish care    Pt presents with concerns of possible acromegaly.  Mother had acromegaly and had similar sx's prior to dx.    Arthritis   Presents for initial visit. The disease course has been worsening. She complains of pain and stiffness. She reports no joint swelling or joint warmth. Pertinent negatives include no diarrhea, fatigue, fever, rash or weight loss. (Family h/o acromegaly)   Headache    This is a recurrent problem. The current episode started more than 1 year ago. The problem occurs intermittently. The problem has been gradually worsening. Pain location: left temporal. Radiates to: entire head/scalp. The pain quality is not similar to prior headaches. The quality of the pain is described as sharp and pulsating. Associated symptoms include blurred vision, photophobia and a visual change. Pertinent negatives include no abdominal pain, abnormal behavior, anorexia, back pain, coughing, dizziness, drainage, ear pain, eye pain, eye redness, eye watering, facial sweating, fever, hearing loss, insomnia, loss of balance, muscle aches, nausea, neck pain, numbness, phonophobia, rhinorrhea, scalp tenderness, seizures, sinus pressure, sore throat, swollen glands, tingling, tinnitus, vomiting, weakness or weight loss. Nothing aggravates the symptoms. She has tried nothing for the symptoms. The treatment provided no relief. Her past medical history is significant for obesity. There is no history of cancer, cluster headaches, hypertension, immunosuppression, migraine headaches, migraines in the family, pseudotumor cerebri, recent head traumas, sinus disease or TMJ. (Family h/o acromegaly)   Menstrual  Problem   This is a new problem. The current episode started more than 1 year ago. The problem occurs constantly (every cycle). The problem has been unchanged. Associated symptoms include headaches and a visual change. Pertinent negatives include no abdominal pain, anorexia, arthralgias, change in bowel habit, chest pain, chills, congestion, coughing, diaphoresis, fatigue, fever, joint swelling, myalgias, nausea, neck pain, numbness, rash, sore throat, swollen glands, urinary symptoms, vertigo, vomiting or weakness. Nothing aggravates the symptoms. She has tried nothing for the symptoms. The treatment provided no relief.   LMP - today    Past Medical History:   Diagnosis Date   • Anxiety    • Depression      Past Surgical History:   Procedure Laterality Date   • FINGER SURGERY  2017   • FOOT SURGERY Left 2018   • WISDOM TOOTH EXTRACTION  2008     Family History   Problem Relation Age of Onset   • Acromegaly Mother    • Stroke Father    • Diabetes Father    • Hypertension Father    • Hyperlipidemia Father    • Breast cancer Maternal Grandmother    • Gout Brother      Social History     Socioeconomic History   • Marital status: Single     Spouse name: Not on file   • Number of children: Not on file   • Years of education: Not on file   • Highest education level: Not on file   Tobacco Use   • Smoking status: Never Smoker   • Smokeless tobacco: Never Used   Substance and Sexual Activity   • Alcohol use: Yes     Comment: rare occasion   • Drug use: No     No Known Allergies        The following portions of the patient's history were reviewed and updated as appropriate: allergies, current medications, past family history, past medical history, past social history, past surgical history and problem list.    Current Outpatient Medications:   •  citalopram (CeleXA) 10 MG tablet, Take 1 tablet by mouth Daily., Disp: 30 tablet, Rfl: 2  •  hydrOXYzine (ATARAX) 10 MG tablet, Take 1 tablet by mouth 3 (Three) Times a Day As Needed  for Anxiety., Disp: 90 tablet, Rfl: 2  •  naproxen (Naprosyn) 500 MG tablet, Take 1 tablet by mouth 2 (Two) Times a Day As Needed (joint pain)., Disp: 60 tablet, Rfl: 2  •  norgestimate-ethinyl estradiol (Ortho Tri-Cyclen Lo) 0.18/0.215/0.25 MG-25 MCG per tablet, Take 1 tablet by mouth Daily., Disp: 28 tablet, Rfl: 12     Review of Systems   Constitutional: Negative for chills, diaphoresis, fatigue, fever and weight loss.   HENT: Negative for congestion, ear pain, hearing loss, rhinorrhea, sinus pressure, sore throat and tinnitus.    Eyes: Positive for blurred vision and photophobia. Negative for pain and redness.   Respiratory: Negative for cough, chest tightness and shortness of breath.    Cardiovascular: Negative for chest pain.   Gastrointestinal: Negative for abdominal pain, anorexia, change in bowel habit, diarrhea, nausea and vomiting.   Endocrine: Negative for cold intolerance and heat intolerance.   Genitourinary: Positive for menstrual problem.   Musculoskeletal: Positive for arthritis and stiffness. Negative for arthralgias, back pain, joint swelling, myalgias and neck pain.   Skin: Negative for rash.   Neurological: Positive for headaches. Negative for dizziness, vertigo, tingling, seizures, weakness, numbness and loss of balance.   Psychiatric/Behavioral: The patient does not have insomnia.        Objective   Physical Exam   Constitutional: She is oriented to person, place, and time. She appears well-developed and well-nourished.   HENT:   Head: Normocephalic and atraumatic.   Right Ear: External ear normal.   Left Ear: External ear normal.   Nose: Nose normal.   Mouth/Throat: Oropharynx is clear and moist.   Eyes: Pupils are equal, round, and reactive to light. Conjunctivae and EOM are normal.   Neck: Normal range of motion. Neck supple.   Cardiovascular: Normal rate, regular rhythm, normal heart sounds and intact distal pulses.   Pulses:       Carotid pulses are 2+ on the right side, and 2+ on the  "left side.  Pulmonary/Chest: Effort normal and breath sounds normal.   Abdominal: Soft. Normal appearance, normal aorta and bowel sounds are normal.   Genitourinary: No breast swelling, tenderness, discharge or bleeding.   Musculoskeletal: Normal range of motion.   Lymphadenopathy:        Head (right side): No tonsillar adenopathy present.        Head (left side): No tonsillar adenopathy present.        Right cervical: No superficial cervical and no posterior cervical adenopathy present.       Left cervical: No superficial cervical and no posterior cervical adenopathy present.   Neurological: She is alert and oriented to person, place, and time. She has normal strength and normal reflexes. She displays a negative Romberg sign.   Skin: Skin is warm, dry and intact.   Psychiatric: She has a normal mood and affect. Her behavior is normal. Judgment and thought content normal.   Vitals reviewed.    Vitals:    06/12/20 1050   BP: 124/70   Pulse: 94   Resp: 16   Temp: 97.8 °F (36.6 °C)   TempSrc: Temporal   SpO2: 100%   Weight: 130 kg (287 lb 9.6 oz)   Height: 172.7 cm (67.99\")         Assessment/Plan   Hoa was seen today for establish care and possibly symptoms of acromegaly.    Diagnoses and all orders for this visit:    Chronic nonintractable headache, unspecified headache type  -     MRI Brain With & Without Contrast; Future  -     CBC & Differential; Future  -     Comprehensive Metabolic Panel; Future  -     TSH Rfx On Abnormal To Free T4; Future  -     Hemoglobin A1c; Future  -     Vitamin B12; Future  -     Vitamin D 25 Hydroxy; Future  -     Sedimentation Rate; Future  -     C-reactive Protein; Future  -     Uric Acid; Future  -     Estradiol; Future  -     Follicle Stimulating Hormone; Future  -     Luteinizing Hormone; Future  -     Prolactin; Future  -     Growth Hormone; Future  -     Insulin-like Growth Factor; Future    Arthralgia, unspecified joint  -     naproxen (Naprosyn) 500 MG tablet; Take 1 tablet by " mouth 2 (Two) Times a Day As Needed (joint pain).  -     CBC & Differential; Future  -     Comprehensive Metabolic Panel; Future  -     TSH Rfx On Abnormal To Free T4; Future  -     Hemoglobin A1c; Future  -     Vitamin B12; Future  -     Vitamin D 25 Hydroxy; Future  -     Sedimentation Rate; Future  -     C-reactive Protein; Future  -     Uric Acid; Future  -     Estradiol; Future  -     Follicle Stimulating Hormone; Future  -     Luteinizing Hormone; Future  -     Prolactin; Future  -     Growth Hormone; Future  -     Insulin-like Growth Factor; Future    Menorrhagia with regular cycle  -     US Non-ob Transvaginal; Future  -     norgestimate-ethinyl estradiol (Ortho Tri-Cyclen Lo) 0.18/0.215/0.25 MG-25 MCG per tablet; Take 1 tablet by mouth Daily.  -     CBC & Differential; Future  -     Comprehensive Metabolic Panel; Future  -     TSH Rfx On Abnormal To Free T4; Future  -     Hemoglobin A1c; Future  -     Vitamin B12; Future  -     Vitamin D 25 Hydroxy; Future  -     Sedimentation Rate; Future  -     C-reactive Protein; Future  -     Uric Acid; Future  -     Estradiol; Future  -     Follicle Stimulating Hormone; Future  -     Luteinizing Hormone; Future  -     Prolactin; Future  -     Growth Hormone; Future  -     Insulin-like Growth Factor; Future    Family history of acromegaly  -     MRI Brain With & Without Contrast; Future  -     CBC & Differential; Future  -     Comprehensive Metabolic Panel; Future  -     TSH Rfx On Abnormal To Free T4; Future  -     Hemoglobin A1c; Future  -     Vitamin B12; Future  -     Vitamin D 25 Hydroxy; Future  -     Sedimentation Rate; Future  -     C-reactive Protein; Future  -     Uric Acid; Future  -     Estradiol; Future  -     Follicle Stimulating Hormone; Future  -     Luteinizing Hormone; Future  -     Prolactin; Future  -     Growth Hormone; Future  -     Insulin-like Growth Factor; Future    Anxiety  -     citalopram (CeleXA) 10 MG tablet; Take 1 tablet by mouth  Daily.  -     hydrOXYzine (ATARAX) 10 MG tablet; Take 1 tablet by mouth 3 (Three) Times a Day As Needed for Anxiety.  -     CBC & Differential; Future  -     Comprehensive Metabolic Panel; Future  -     TSH Rfx On Abnormal To Free T4; Future  -     Hemoglobin A1c; Future  -     Vitamin B12; Future  -     Vitamin D 25 Hydroxy; Future  -     Sedimentation Rate; Future  -     C-reactive Protein; Future  -     Uric Acid; Future  -     Estradiol; Future  -     Follicle Stimulating Hormone; Future  -     Luteinizing Hormone; Future  -     Prolactin; Future  -     Growth Hormone; Future  -     Insulin-like Growth Factor; Future    Moderate episode of recurrent major depressive disorder (CMS/HCC)  -     citalopram (CeleXA) 10 MG tablet; Take 1 tablet by mouth Daily.  -     CBC & Differential; Future  -     Comprehensive Metabolic Panel; Future  -     TSH Rfx On Abnormal To Free T4; Future  -     Hemoglobin A1c; Future  -     Vitamin B12; Future  -     Vitamin D 25 Hydroxy; Future  -     Sedimentation Rate; Future  -     C-reactive Protein; Future  -     Uric Acid; Future  -     Estradiol; Future  -     Follicle Stimulating Hormone; Future  -     Luteinizing Hormone; Future  -     Prolactin; Future  -     Growth Hormone; Future  -     Insulin-like Growth Factor; Future      We will ck a great deal of labs today  Will set up for an MRI of the brain  Will schedule a TV US for menorrhagia  Naprosyn PRN for joint pain  Will restart Celexa at 10 mg and Hydroxyzine as she has been on this in the past and did well  Return in about 6 weeks (around 7/24/2020) for Annual.             Answers for HPI/ROS submitted by the patient on 6/8/2020   What is the primary reason for your visit?: Other  Please describe your symptoms.: Mainly concerned I might have acromegaly. My sister was recently diagnosed with it after having an MRI and my mom had this issue too. Lately, I've had migraines that interrupt my sleep, I often feel tired and  fatigued and joint issues. I do have a few skin tags as well and my menstral cycles have been heavy for as long as I can remember. I also have issues with my weight as well and people have said my facial features look bigger than they did in the past.  Have you had these symptoms before?: Yes  How long have you been having these symptoms?: Greater than 2 weeks  Please list any medications you are currently taking for this condition.: None.  Please describe any probable cause for these symptoms. : Acromegaly for the most part. I'm pretty certain since my mom did have this issue previously and my sister was just diagnosed with it.

## 2020-06-13 LAB
25(OH)D3+25(OH)D2 SERPL-MCNC: 15.9 NG/ML (ref 30–100)
ALBUMIN SERPL-MCNC: 4.3 G/DL (ref 3.9–5)
ALBUMIN/GLOB SERPL: 1.7 {RATIO} (ref 1.2–2.2)
ALP SERPL-CCNC: 71 IU/L (ref 39–117)
ALT SERPL-CCNC: 17 IU/L (ref 0–32)
AST SERPL-CCNC: 15 IU/L (ref 0–40)
BASOPHILS # BLD AUTO: 0.1 X10E3/UL (ref 0–0.2)
BASOPHILS NFR BLD AUTO: 1 %
BILIRUB SERPL-MCNC: 0.3 MG/DL (ref 0–1.2)
BUN SERPL-MCNC: 11 MG/DL (ref 6–20)
BUN/CREAT SERPL: 17 (ref 9–23)
CALCIUM SERPL-MCNC: 8.9 MG/DL (ref 8.7–10.2)
CHLORIDE SERPL-SCNC: 105 MMOL/L (ref 96–106)
CO2 SERPL-SCNC: 21 MMOL/L (ref 20–29)
CREAT SERPL-MCNC: 0.64 MG/DL (ref 0.57–1)
CRP SERPL-MCNC: 4 MG/L (ref 0–10)
EOSINOPHIL # BLD AUTO: 0.1 X10E3/UL (ref 0–0.4)
EOSINOPHIL NFR BLD AUTO: 1 %
ERYTHROCYTE [DISTWIDTH] IN BLOOD BY AUTOMATED COUNT: 14.3 % (ref 11.7–15.4)
ERYTHROCYTE [SEDIMENTATION RATE] IN BLOOD BY WESTERGREN METHOD: 20 MM/HR (ref 0–32)
ESTRADIOL SERPL-MCNC: 30 PG/ML
FSH SERPL-ACNC: 6.8 MIU/ML
GLOBULIN SER CALC-MCNC: 2.6 G/DL (ref 1.5–4.5)
GLUCOSE SERPL-MCNC: 97 MG/DL (ref 65–99)
HBA1C MFR BLD: 5.5 % (ref 4.8–5.6)
HCT VFR BLD AUTO: 35 % (ref 34–46.6)
HGB BLD-MCNC: 11.2 G/DL (ref 11.1–15.9)
IMM GRANULOCYTES # BLD AUTO: 0.1 X10E3/UL (ref 0–0.1)
IMM GRANULOCYTES NFR BLD AUTO: 1 %
LH SERPL-ACNC: 4.6 MIU/ML
LYMPHOCYTES # BLD AUTO: 2.4 X10E3/UL (ref 0.7–3.1)
LYMPHOCYTES NFR BLD AUTO: 36 %
MCH RBC QN AUTO: 26.6 PG (ref 26.6–33)
MCHC RBC AUTO-ENTMCNC: 32 G/DL (ref 31.5–35.7)
MCV RBC AUTO: 83 FL (ref 79–97)
MONOCYTES # BLD AUTO: 0.4 X10E3/UL (ref 0.1–0.9)
MONOCYTES NFR BLD AUTO: 6 %
NEUTROPHILS # BLD AUTO: 3.7 X10E3/UL (ref 1.4–7)
NEUTROPHILS NFR BLD AUTO: 55 %
PLATELET # BLD AUTO: 308 X10E3/UL (ref 150–450)
POTASSIUM SERPL-SCNC: 4.5 MMOL/L (ref 3.5–5.2)
PROT SERPL-MCNC: 6.9 G/DL (ref 6–8.5)
RBC # BLD AUTO: 4.21 X10E6/UL (ref 3.77–5.28)
SODIUM SERPL-SCNC: 141 MMOL/L (ref 134–144)
T4 FREE SERPL-MCNC: 1.06 NG/DL (ref 0.82–1.77)
TSH SERPL DL<=0.005 MIU/L-ACNC: 4.74 UIU/ML (ref 0.45–4.5)
URATE SERPL-MCNC: 7.3 MG/DL (ref 2.5–7.1)
VIT B12 SERPL-MCNC: 244 PG/ML (ref 232–1245)
WBC # BLD AUTO: 6.7 X10E3/UL (ref 3.4–10.8)

## 2020-07-01 ENCOUNTER — TELEPHONE (OUTPATIENT)
Dept: INTERNAL MEDICINE | Facility: CLINIC | Age: 30
End: 2020-07-01

## 2020-07-01 NOTE — TELEPHONE ENCOUNTER
Attempted to contact patient to inform of result notes as ordered by provider and as listed below. No answer; Left voicemail for patient to return call with office number given.       ----- Message from SHARATH Lin sent at 6/23/2020  3:22 PM EDT -----  Please let pt know that her Vit D is very low.  I am sending in Rx strength D3 that she will take once a week x 8 weeks, then she will need to  OTC D3 4000 units and take daily.    B12 is also low.  I recommend OTC B12 1000 mcg daily.    Uric Acid is slightly elevated.  I will send in a med that she can take PRN for joint pain/gout sx's.      All other labs were within acceptable limits.

## 2020-07-02 ENCOUNTER — TELEPHONE (OUTPATIENT)
Dept: INTERNAL MEDICINE | Facility: CLINIC | Age: 30
End: 2020-07-02

## 2020-07-02 ENCOUNTER — PATIENT MESSAGE (OUTPATIENT)
Dept: INTERNAL MEDICINE | Facility: CLINIC | Age: 30
End: 2020-07-02

## 2020-07-02 RX ORDER — LANOLIN ALCOHOL/MO/W.PET/CERES
1000 CREAM (GRAM) TOPICAL DAILY
Qty: 90 TABLET | Refills: 1 | Status: SHIPPED | OUTPATIENT
Start: 2020-07-02 | End: 2020-10-28 | Stop reason: SDUPTHER

## 2020-07-02 RX ORDER — CHOLECALCIFEROL (VITAMIN D3) 1250 MCG
50000 CAPSULE ORAL
Qty: 8 CAPSULE | Refills: 0 | Status: SHIPPED | OUTPATIENT
Start: 2020-07-02 | End: 2021-03-04

## 2020-07-02 RX ORDER — INDOMETHACIN 50 MG/1
50 CAPSULE ORAL 3 TIMES DAILY PRN
Qty: 90 CAPSULE | Refills: 1 | Status: SHIPPED | OUTPATIENT
Start: 2020-07-02 | End: 2020-10-28 | Stop reason: SDUPTHER

## 2020-07-02 NOTE — TELEPHONE ENCOUNTER
PTS MOTHER CALLED REQUESTING A REFILL FOR:    VIT C AND URIC ACID    [id number] - [place], [place] - [address] AT [facility] & [facility] [phone] [facility] [phone] FX

## 2020-07-02 NOTE — TELEPHONE ENCOUNTER
All meds have been sent in.  Please advise to avoid all other NSAIDs when taking the Indocin PRN for gout pain.

## 2020-07-07 DIAGNOSIS — N92.0 MENORRHAGIA WITH REGULAR CYCLE: ICD-10-CM

## 2020-07-07 DIAGNOSIS — F41.9 ANXIETY: ICD-10-CM

## 2020-07-07 DIAGNOSIS — F33.1 MODERATE EPISODE OF RECURRENT MAJOR DEPRESSIVE DISORDER (HCC): ICD-10-CM

## 2020-07-09 RX ORDER — CITALOPRAM 10 MG/1
10 TABLET ORAL DAILY
Qty: 30 TABLET | Refills: 0 | Status: SHIPPED | OUTPATIENT
Start: 2020-07-09 | End: 2020-08-06 | Stop reason: SDUPTHER

## 2020-07-09 RX ORDER — NORGESTIMATE AND ETHINYL ESTRADIOL 7DAYSX3 LO
1 KIT ORAL DAILY
Qty: 28 TABLET | Refills: 0 | Status: SHIPPED | OUTPATIENT
Start: 2020-07-09 | End: 2020-08-06 | Stop reason: SDUPTHER

## 2020-07-10 ENCOUNTER — HOSPITAL ENCOUNTER (OUTPATIENT)
Dept: MRI IMAGING | Facility: HOSPITAL | Age: 30
Discharge: HOME | End: 2020-07-10
Admitting: NURSE PRACTITIONER

## 2020-07-10 ENCOUNTER — HOSPITAL ENCOUNTER (OUTPATIENT)
Dept: ULTRASOUND IMAGING | Facility: HOSPITAL | Age: 30
Discharge: HOME | End: 2020-07-10

## 2020-07-10 DIAGNOSIS — G89.29 CHRONIC NONINTRACTABLE HEADACHE, UNSPECIFIED HEADACHE TYPE: ICD-10-CM

## 2020-07-10 DIAGNOSIS — R51.9 CHRONIC NONINTRACTABLE HEADACHE, UNSPECIFIED HEADACHE TYPE: ICD-10-CM

## 2020-07-10 DIAGNOSIS — Z83.49: ICD-10-CM

## 2020-07-10 DIAGNOSIS — N92.0 MENORRHAGIA WITH REGULAR CYCLE: ICD-10-CM

## 2020-07-10 PROCEDURE — 76830 TRANSVAGINAL US NON-OB: CPT

## 2020-07-10 PROCEDURE — 0 GADOBENATE DIMEGLUMINE 529 MG/ML SOLUTION: Performed by: NURSE PRACTITIONER

## 2020-07-10 PROCEDURE — 70553 MRI BRAIN STEM W/O & W/DYE: CPT

## 2020-07-10 PROCEDURE — A9577 INJ MULTIHANCE: HCPCS | Performed by: NURSE PRACTITIONER

## 2020-07-10 RX ADMIN — GADOBENATE DIMEGLUMINE 20 ML: 529 INJECTION, SOLUTION INTRAVENOUS at 17:44

## 2020-08-06 DIAGNOSIS — F33.1 MODERATE EPISODE OF RECURRENT MAJOR DEPRESSIVE DISORDER (HCC): ICD-10-CM

## 2020-08-06 DIAGNOSIS — F41.9 ANXIETY: ICD-10-CM

## 2020-08-06 DIAGNOSIS — N92.0 MENORRHAGIA WITH REGULAR CYCLE: ICD-10-CM

## 2020-08-06 RX ORDER — CITALOPRAM 10 MG/1
10 TABLET ORAL DAILY
Qty: 30 TABLET | Refills: 0 | Status: SHIPPED | OUTPATIENT
Start: 2020-08-06 | End: 2020-09-02 | Stop reason: SDUPTHER

## 2020-08-06 RX ORDER — NORGESTIMATE AND ETHINYL ESTRADIOL 7DAYSX3 LO
1 KIT ORAL DAILY
Qty: 28 TABLET | Refills: 0 | Status: SHIPPED | OUTPATIENT
Start: 2020-08-06 | End: 2020-09-02 | Stop reason: SDUPTHER

## 2020-08-11 ENCOUNTER — OFFICE VISIT (OUTPATIENT)
Dept: INTERNAL MEDICINE | Facility: CLINIC | Age: 30
End: 2020-08-11

## 2020-08-11 VITALS
SYSTOLIC BLOOD PRESSURE: 100 MMHG | RESPIRATION RATE: 16 BRPM | DIASTOLIC BLOOD PRESSURE: 62 MMHG | BODY MASS INDEX: 43.86 KG/M2 | HEIGHT: 68 IN | TEMPERATURE: 97.5 F | WEIGHT: 289.4 LBS | HEART RATE: 96 BPM | OXYGEN SATURATION: 99 %

## 2020-08-11 DIAGNOSIS — R42 DIZZINESS: Primary | ICD-10-CM

## 2020-08-11 PROCEDURE — 99213 OFFICE O/P EST LOW 20 MIN: CPT | Performed by: NURSE PRACTITIONER

## 2020-08-11 NOTE — PROGRESS NOTES
Subjective   Hoa Lewis is a 29 y.o. female    Chief Complaint   Patient presents with   • When she showers she gets a little faint/dizzy     it happens when she gets in the shower and last about 30 mins after.      Dizziness   This is a new problem. The current episode started 1 to 4 weeks ago. The problem occurs rarely (When in the shower). The problem has been waxing and waning. Pertinent negatives include no abdominal pain, anorexia, arthralgias, change in bowel habit, chest pain, chills, congestion, coughing, diaphoresis, fatigue, fever, headaches, joint swelling, myalgias, nausea, neck pain, numbness, rash, sore throat, swollen glands, urinary symptoms, vertigo, visual change, vomiting or weakness. Exacerbated by: Showering. She has tried nothing for the symptoms. The treatment provided no relief.            The following portions of the patient's history were reviewed and updated as appropriate: allergies, current medications, past family history, past medical history, past social history, past surgical history and problem list.    Current Outpatient Medications:   •  Cholecalciferol (VITAMIN D3) 1.25 MG (95485 UT) capsule, Take 1 capsule by mouth Every 7 (Seven) Days for 8 doses., Disp: 8 capsule, Rfl: 0  •  citalopram (CeleXA) 10 MG tablet, Take 1 tablet by mouth Daily., Disp: 30 tablet, Rfl: 0  •  hydrOXYzine (ATARAX) 10 MG tablet, Take 1 tablet by mouth 3 (Three) Times a Day As Needed for Anxiety., Disp: 90 tablet, Rfl: 2  •  indomethacin (INDOCIN) 50 MG capsule, Take 1 capsule by mouth 3 (Three) Times a Day As Needed for Mild Pain  (take with food)., Disp: 90 capsule, Rfl: 1  •  norgestimate-ethinyl estradiol (Ortho Tri-Cyclen Lo) 0.18/0.215/0.25 MG-25 MCG per tablet, Take 1 tablet by mouth Daily., Disp: 28 tablet, Rfl: 0  •  vitamin B-12 (CYANOCOBALAMIN) 1000 MCG tablet, Take 1 tablet by mouth Daily., Disp: 90 tablet, Rfl: 1     Review of Systems   Constitutional: Negative for chills,  diaphoresis, fatigue and fever.   HENT: Negative for congestion and sore throat.    Respiratory: Negative for cough, chest tightness and shortness of breath.    Cardiovascular: Negative for chest pain.   Gastrointestinal: Negative for abdominal pain, anorexia, change in bowel habit, diarrhea, nausea and vomiting.   Endocrine: Negative for cold intolerance and heat intolerance.   Musculoskeletal: Negative for arthralgias, joint swelling, myalgias and neck pain.   Skin: Negative for rash.   Neurological: Positive for dizziness. Negative for vertigo, weakness, numbness and headaches.       Objective   Physical Exam   Constitutional: She is oriented to person, place, and time. She appears well-developed and well-nourished.   HENT:   Head: Normocephalic and atraumatic.   Right Ear: External ear normal.   Left Ear: External ear normal.   Nose: Nose normal.   Mouth/Throat: Oropharynx is clear and moist.   Eyes: Pupils are equal, round, and reactive to light. Conjunctivae and EOM are normal.   Neck: Normal range of motion. Neck supple.   Cardiovascular: Normal rate, regular rhythm, normal heart sounds and intact distal pulses.   Pulses:       Carotid pulses are 2+ on the right side, and 2+ on the left side.  Pulmonary/Chest: Effort normal and breath sounds normal. Right breast exhibits no inverted nipple, no mass, no nipple discharge, no skin change and no tenderness. Left breast exhibits no inverted nipple, no mass, no nipple discharge, no skin change and no tenderness. No breast swelling, tenderness, discharge or bleeding. Breasts are symmetrical.   Abdominal: Soft. Normal appearance, normal aorta and bowel sounds are normal.   Genitourinary: Vagina normal and uterus normal. No breast swelling, tenderness, discharge or bleeding.   Musculoskeletal: Normal range of motion.   Lymphadenopathy:        Head (right side): No tonsillar adenopathy present.        Head (left side): No tonsillar adenopathy present.        Right  "cervical: No superficial cervical and no posterior cervical adenopathy present.       Left cervical: No superficial cervical and no posterior cervical adenopathy present.   Neurological: She is alert and oriented to person, place, and time. She has normal strength and normal reflexes. She displays a negative Romberg sign. GCS eye subscore is 4. GCS verbal subscore is 5. GCS motor subscore is 6.   Skin: Skin is warm, dry and intact.   Psychiatric: She has a normal mood and affect. Her behavior is normal. Judgment and thought content normal.   Vitals reviewed.    Vitals:    08/11/20 0942   BP: 100/62   Pulse: 96   Resp: 16   Temp: 97.5 °F (36.4 °C)   TempSrc: Infrared   SpO2: 99%   Weight: 131 kg (289 lb 6.4 oz)   Height: 172.7 cm (67.99\")         Assessment/Plan   Hoa was seen today for when she showers she gets a little faint/dizzy.    Diagnoses and all orders for this visit:    Dizziness      Encourage patient to increase water intake to half of body weight in ounces  Also encouraged electrolyte hydration with small amount of Gatorade daily  Increase sodium intake  Slow position changes  Avoid taking extremely hot showers  Follow-up as scheduled in October or sooner with any problems           "

## 2020-09-02 DIAGNOSIS — F41.9 ANXIETY: ICD-10-CM

## 2020-09-02 DIAGNOSIS — N92.0 MENORRHAGIA WITH REGULAR CYCLE: ICD-10-CM

## 2020-09-02 DIAGNOSIS — F33.1 MODERATE EPISODE OF RECURRENT MAJOR DEPRESSIVE DISORDER (HCC): ICD-10-CM

## 2020-09-04 RX ORDER — CITALOPRAM 10 MG/1
10 TABLET ORAL DAILY
Qty: 30 TABLET | Refills: 0 | Status: SHIPPED | OUTPATIENT
Start: 2020-09-04 | End: 2020-10-01 | Stop reason: SDUPTHER

## 2020-09-04 RX ORDER — NORGESTIMATE AND ETHINYL ESTRADIOL 7DAYSX3 LO
1 KIT ORAL DAILY
Qty: 28 TABLET | Refills: 0 | Status: SHIPPED | OUTPATIENT
Start: 2020-09-04 | End: 2020-10-01 | Stop reason: SDUPTHER

## 2020-09-29 DIAGNOSIS — H91.93 DECREASED HEARING OF BOTH EARS: ICD-10-CM

## 2020-09-29 DIAGNOSIS — H93.8X3 PRESSURE SENSATION IN BOTH EARS: Primary | ICD-10-CM

## 2020-10-01 DIAGNOSIS — F41.9 ANXIETY: ICD-10-CM

## 2020-10-01 DIAGNOSIS — F33.1 MODERATE EPISODE OF RECURRENT MAJOR DEPRESSIVE DISORDER (HCC): ICD-10-CM

## 2020-10-01 DIAGNOSIS — N92.0 MENORRHAGIA WITH REGULAR CYCLE: ICD-10-CM

## 2020-10-04 RX ORDER — CITALOPRAM 10 MG/1
10 TABLET ORAL DAILY
Qty: 30 TABLET | Refills: 2 | Status: SHIPPED | OUTPATIENT
Start: 2020-10-04 | End: 2020-10-28 | Stop reason: SDUPTHER

## 2020-10-04 RX ORDER — NORGESTIMATE AND ETHINYL ESTRADIOL 7DAYSX3 LO
1 KIT ORAL DAILY
Qty: 28 TABLET | Refills: 2 | Status: SHIPPED | OUTPATIENT
Start: 2020-10-04 | End: 2020-10-28 | Stop reason: SDUPTHER

## 2020-10-28 DIAGNOSIS — N92.0 MENORRHAGIA WITH REGULAR CYCLE: ICD-10-CM

## 2020-10-28 DIAGNOSIS — F41.9 ANXIETY: ICD-10-CM

## 2020-10-28 DIAGNOSIS — F33.1 MODERATE EPISODE OF RECURRENT MAJOR DEPRESSIVE DISORDER (HCC): ICD-10-CM

## 2020-10-29 RX ORDER — INDOMETHACIN 50 MG/1
50 CAPSULE ORAL 3 TIMES DAILY PRN
Qty: 90 CAPSULE | Refills: 1 | Status: SHIPPED | OUTPATIENT
Start: 2020-10-29 | End: 2021-02-23 | Stop reason: SDUPTHER

## 2020-10-29 RX ORDER — CITALOPRAM 10 MG/1
10 TABLET ORAL DAILY
Qty: 30 TABLET | Refills: 2 | Status: SHIPPED | OUTPATIENT
Start: 2020-10-29 | End: 2020-11-29 | Stop reason: SDUPTHER

## 2020-10-29 RX ORDER — NORGESTIMATE AND ETHINYL ESTRADIOL 7DAYSX3 LO
1 KIT ORAL DAILY
Qty: 28 TABLET | Refills: 2 | Status: SHIPPED | OUTPATIENT
Start: 2020-10-29 | End: 2020-11-29 | Stop reason: SDUPTHER

## 2020-10-29 RX ORDER — LANOLIN ALCOHOL/MO/W.PET/CERES
1000 CREAM (GRAM) TOPICAL DAILY
Qty: 90 TABLET | Refills: 1 | Status: SHIPPED | OUTPATIENT
Start: 2020-10-29 | End: 2022-09-29

## 2020-11-29 DIAGNOSIS — F33.1 MODERATE EPISODE OF RECURRENT MAJOR DEPRESSIVE DISORDER (HCC): ICD-10-CM

## 2020-11-29 DIAGNOSIS — F41.9 ANXIETY: ICD-10-CM

## 2020-11-29 DIAGNOSIS — N92.0 MENORRHAGIA WITH REGULAR CYCLE: ICD-10-CM

## 2020-11-30 RX ORDER — CITALOPRAM 10 MG/1
10 TABLET ORAL DAILY
Qty: 30 TABLET | Refills: 2 | Status: SHIPPED | OUTPATIENT
Start: 2020-11-30 | End: 2020-12-15

## 2020-11-30 RX ORDER — NORGESTIMATE AND ETHINYL ESTRADIOL 7DAYSX3 LO
1 KIT ORAL DAILY
Qty: 28 TABLET | Refills: 2 | Status: SHIPPED | OUTPATIENT
Start: 2020-11-30 | End: 2021-02-16

## 2020-12-15 ENCOUNTER — OFFICE VISIT (OUTPATIENT)
Dept: INTERNAL MEDICINE | Facility: CLINIC | Age: 30
End: 2020-12-15

## 2020-12-15 VITALS
SYSTOLIC BLOOD PRESSURE: 112 MMHG | WEIGHT: 293 LBS | RESPIRATION RATE: 16 BRPM | BODY MASS INDEX: 44.41 KG/M2 | TEMPERATURE: 97.1 F | OXYGEN SATURATION: 98 % | HEIGHT: 68 IN | DIASTOLIC BLOOD PRESSURE: 70 MMHG | HEART RATE: 100 BPM

## 2020-12-15 DIAGNOSIS — Z00.00 ROUTINE GENERAL MEDICAL EXAMINATION AT A HEALTH CARE FACILITY: Primary | ICD-10-CM

## 2020-12-15 DIAGNOSIS — Z11.59 ENCOUNTER FOR HEPATITIS C SCREENING TEST FOR LOW RISK PATIENT: ICD-10-CM

## 2020-12-15 DIAGNOSIS — E55.9 VITAMIN D DEFICIENCY: ICD-10-CM

## 2020-12-15 DIAGNOSIS — F33.1 MODERATE EPISODE OF RECURRENT MAJOR DEPRESSIVE DISORDER (HCC): ICD-10-CM

## 2020-12-15 DIAGNOSIS — Z23 NEED FOR INFLUENZA VACCINATION: ICD-10-CM

## 2020-12-15 DIAGNOSIS — F41.9 ANXIETY: ICD-10-CM

## 2020-12-15 PROCEDURE — 90471 IMMUNIZATION ADMIN: CPT | Performed by: NURSE PRACTITIONER

## 2020-12-15 PROCEDURE — 90686 IIV4 VACC NO PRSV 0.5 ML IM: CPT | Performed by: NURSE PRACTITIONER

## 2020-12-15 PROCEDURE — 99395 PREV VISIT EST AGE 18-39: CPT | Performed by: NURSE PRACTITIONER

## 2020-12-15 RX ORDER — CITALOPRAM 20 MG/1
20 TABLET ORAL DAILY
Qty: 90 TABLET | Refills: 1 | Status: SHIPPED | OUTPATIENT
Start: 2020-12-15 | End: 2021-05-06

## 2020-12-15 NOTE — PROGRESS NOTES
Subjective   Hoa Lewis is a 30 y.o. female    Chief Complaint   Patient presents with   • Annual Exam     History of Present Illness     Here for annual exam    PHQ-9 Depression Screening 12/15/2020  Little interest or pleasure in doing things? 3   Feeling down, depressed, or hopeless? 3   Trouble falling or staying asleep, or sleeping too much? 0   Feeling tired or having little energy? 3   Poor appetite or overeating? 0   Feeling bad about yourself - or that you are a failure or have let yourself or your family down? 3   Trouble concentrating on things, such as reading the newspaper or watching television? 3   Moving or speaking so slowly that other people could have noticed? Or the opposite - being so fidgety or restless that you have been moving around a lot more than usual? 3(Fidgety)   Thoughts that you would be better off dead, or of hurting yourself in some way? 0   PHQ-9 Total Score 18   If you checked off any problems, how difficult have these problems made it for you to do your work, take care of things at home, or get along with other people? Very difficult     Anxiety/depression - chronic; currently taking Celexa 10 mg daily.  Feels like she could use a higher dose.      Pap - 2018, pt declines updating today, states that she is assexual  Tdap - updating today  Flu shot - updating today  Hep A - unknown  Hep C screening - ordered    Diet - could use work    Exercise - none    Social History     Tobacco Use   • Smoking status: Never Smoker   • Smokeless tobacco: Never Used   Substance Use Topics   • Alcohol use: Yes     Comment: rare occasion   • Drug use: No         The following portions of the patient's history were reviewed and updated as appropriate: allergies, current medications, past family history, past medical history, past social history, past surgical history and problem list.    Current Outpatient Medications:   •  hydrOXYzine (ATARAX) 10 MG tablet, Take 1 tablet by mouth 3 (Three)  Times a Day As Needed for Anxiety., Disp: 90 tablet, Rfl: 2  •  indomethacin (INDOCIN) 50 MG capsule, Take 1 capsule by mouth 3 (Three) Times a Day As Needed for Mild Pain  (take with food)., Disp: 90 capsule, Rfl: 1  •  norgestimate-ethinyl estradiol (Ortho Tri-Cyclen Lo) 0.18/0.215/0.25 MG-25 MCG per tablet, Take 1 tablet by mouth Daily., Disp: 28 tablet, Rfl: 2  •  citalopram (CeleXA) 20 MG tablet, Take 1 tablet by mouth Daily., Disp: 90 tablet, Rfl: 1  •  vitamin B-12 (CYANOCOBALAMIN) 1000 MCG tablet, Take 1 tablet by mouth Daily., Disp: 90 tablet, Rfl: 1     Review of Systems   Constitutional: Negative for appetite change, chills, fatigue, fever and unexpected weight change.   HENT: Negative for congestion, ear pain, nosebleeds, rhinorrhea and tinnitus.    Eyes: Negative for pain.   Respiratory: Negative for cough, chest tightness and shortness of breath.    Cardiovascular: Negative for chest pain, palpitations and leg swelling.   Gastrointestinal: Negative for abdominal distention, abdominal pain, blood in stool, constipation, diarrhea, nausea and vomiting.   Endocrine: Negative for cold intolerance, heat intolerance, polydipsia, polyphagia and polyuria.   Genitourinary: Negative for dysuria, flank pain, frequency, hematuria and urgency.   Musculoskeletal: Negative for arthralgias, back pain, gait problem, joint swelling, myalgias and neck pain.   Skin: Negative for color change, pallor, rash and wound.   Allergic/Immunologic: Negative for environmental allergies and food allergies.   Neurological: Negative for dizziness, syncope, weakness, light-headedness, numbness and headaches.   Hematological: Negative for adenopathy. Does not bruise/bleed easily.   Psychiatric/Behavioral: Negative for behavioral problems and suicidal ideas. The patient is nervous/anxious.        Objective   Physical Exam  Vitals signs reviewed. Exam conducted with a chaperone present.   Constitutional:       Appearance: Normal  appearance. She is well-developed and normal weight.   HENT:      Head: Normocephalic and atraumatic.      Right Ear: External ear normal.      Left Ear: External ear normal.      Nose: Nose normal.      Mouth/Throat:      Mouth: Mucous membranes are moist.      Pharynx: Oropharynx is clear.   Eyes:      Conjunctiva/sclera: Conjunctivae normal.      Pupils: Pupils are equal, round, and reactive to light.   Neck:      Musculoskeletal: Normal range of motion and neck supple.   Cardiovascular:      Rate and Rhythm: Normal rate and regular rhythm.      Pulses: Normal pulses.           Carotid pulses are 2+ on the right side and 2+ on the left side.     Heart sounds: Normal heart sounds.   Pulmonary:      Effort: Pulmonary effort is normal.      Breath sounds: Normal breath sounds.   Chest:      Breasts: Breasts are symmetrical.         Right: No inverted nipple, mass, nipple discharge, skin change or tenderness.         Left: No inverted nipple, mass, nipple discharge, skin change or tenderness.   Abdominal:      General: Bowel sounds are normal.      Palpations: Abdomen is soft.   Genitourinary:     Pubic Area: No rash.       Vagina: Normal.      Cervix: Normal.      Uterus: Normal.       Adnexa: Right adnexa normal and left adnexa normal.   Musculoskeletal: Normal range of motion.   Lymphadenopathy:      Head:      Right side of head: No tonsillar adenopathy.      Left side of head: No tonsillar adenopathy.      Cervical:      Right cervical: No superficial or posterior cervical adenopathy.     Left cervical: No superficial or posterior cervical adenopathy.   Skin:     General: Skin is warm and dry.      Capillary Refill: Capillary refill takes less than 2 seconds.   Neurological:      Mental Status: She is alert and oriented to person, place, and time.      Deep Tendon Reflexes: Reflexes are normal and symmetric.   Psychiatric:         Mood and Affect: Mood is anxious.         Behavior: Behavior normal.          "Thought Content: Thought content normal.         Judgment: Judgment normal.       Vitals:    12/15/20 1532   BP: 112/70   Pulse: 100   Resp: 16   Temp: 97.1 °F (36.2 °C)   TempSrc: Infrared   SpO2: 98%   Weight: 134 kg (295 lb)   Height: 172.7 cm (67.99\")         Assessment/Plan   Diagnoses and all orders for this visit:    1. Routine general medical examination at a health care facility (Primary)  -     CBC & Differential; Future  -     Comprehensive Metabolic Panel; Future  -     Lipid Panel; Future  -     TSH; Future  -     Vitamin B12; Future  -     Vitamin D 25 Hydroxy; Future  -     Hepatitis C Antibody; Future  -     Hepatitis C Antibody  -     Vitamin D 25 Hydroxy  -     Vitamin B12  -     TSH  -     Lipid Panel  -     Comprehensive Metabolic Panel  -     CBC & Differential    2. Need for influenza vaccination  -     FluLaval Quad >6 Months ([id number])    3. Anxiety  -     citalopram (CeleXA) 20 MG tablet; Take 1 tablet by mouth Daily.  Dispense: 90 tablet; Refill: 1  -     CBC & Differential; Future  -     Comprehensive Metabolic Panel; Future  -     Lipid Panel; Future  -     TSH; Future  -     Vitamin B12; Future  -     Vitamin D 25 Hydroxy; Future  -     Hepatitis C Antibody; Future  -     Hepatitis C Antibody  -     Vitamin D 25 Hydroxy  -     Vitamin B12  -     TSH  -     Lipid Panel  -     Comprehensive Metabolic Panel  -     CBC & Differential    4. Moderate episode of recurrent major depressive disorder (CMS/HCC)  -     citalopram (CeleXA) 20 MG tablet; Take 1 tablet by mouth Daily.  Dispense: 90 tablet; Refill: 1  -     CBC & Differential; Future  -     Comprehensive Metabolic Panel; Future  -     Lipid Panel; Future  -     TSH; Future  -     Vitamin B12; Future  -     Vitamin D 25 Hydroxy; Future  -     Hepatitis C Antibody; Future  -     Hepatitis C Antibody  -     Vitamin D 25 Hydroxy  -     Vitamin B12  -     TSH  -     Lipid Panel  -     Comprehensive Metabolic Panel  -     CBC & " Differential    5. Encounter for hepatitis C screening test for low risk patient  -     CBC & Differential; Future  -     Comprehensive Metabolic Panel; Future  -     Lipid Panel; Future  -     TSH; Future  -     Vitamin B12; Future  -     Vitamin D 25 Hydroxy; Future  -     Hepatitis C Antibody; Future  -     Hepatitis C Antibody  -     Vitamin D 25 Hydroxy  -     Vitamin B12  -     TSH  -     Lipid Panel  -     Comprehensive Metabolic Panel  -     CBC & Differential    6. Vitamin D deficiency  -     CBC & Differential; Future  -     Comprehensive Metabolic Panel; Future  -     Lipid Panel; Future  -     TSH; Future  -     Vitamin B12; Future  -     Vitamin D 25 Hydroxy; Future  -     Hepatitis C Antibody; Future  -     Hepatitis C Antibody  -     Vitamin D 25 Hydroxy  -     Vitamin B12  -     TSH  -     Lipid Panel  -     Comprehensive Metabolic Panel  -     CBC & Differential      Labs sent  Flu shot updated  Celexa increased to 20 mg daily  Pt declines pap  Counseling - diet and exercise  Return in about 6 weeks (around 1/26/2021).

## 2020-12-16 ENCOUNTER — LAB REQUISITION (OUTPATIENT)
Dept: LAB | Facility: HOSPITAL | Age: 30
End: 2020-12-16

## 2020-12-16 DIAGNOSIS — Z00.00 ROUTINE GENERAL MEDICAL EXAMINATION AT A HEALTH CARE FACILITY: ICD-10-CM

## 2020-12-17 LAB
25(OH)D3+25(OH)D2 SERPL-MCNC: 41.3 NG/ML (ref 30–100)
ALBUMIN SERPL-MCNC: 4 G/DL (ref 3.5–5.2)
ALBUMIN/GLOB SERPL: 1.4 G/DL
ALP SERPL-CCNC: 65 U/L (ref 39–117)
ALT SERPL-CCNC: 11 U/L (ref 1–33)
AST SERPL-CCNC: 10 U/L (ref 1–32)
BASOPHILS # BLD AUTO: 0.06 10*3/MM3 (ref 0–0.2)
BASOPHILS NFR BLD AUTO: 0.7 % (ref 0–1.5)
BILIRUB SERPL-MCNC: 0.3 MG/DL (ref 0–1.2)
BUN SERPL-MCNC: 12 MG/DL (ref 6–20)
BUN/CREAT SERPL: 20.3 (ref 7–25)
CALCIUM SERPL-MCNC: 9.4 MG/DL (ref 8.6–10.5)
CHLORIDE SERPL-SCNC: 104 MMOL/L (ref 98–107)
CHOLEST SERPL-MCNC: 222 MG/DL (ref 0–200)
CO2 SERPL-SCNC: 26 MMOL/L (ref 22–29)
CREAT SERPL-MCNC: 0.59 MG/DL (ref 0.57–1)
EOSINOPHIL # BLD AUTO: 0.1 10*3/MM3 (ref 0–0.4)
EOSINOPHIL NFR BLD AUTO: 1.1 % (ref 0.3–6.2)
ERYTHROCYTE [DISTWIDTH] IN BLOOD BY AUTOMATED COUNT: 13.8 % (ref 12.3–15.4)
GLOBULIN SER CALC-MCNC: 2.9 GM/DL
GLUCOSE SERPL-MCNC: 91 MG/DL (ref 65–99)
HCT VFR BLD AUTO: 34.7 % (ref 34–46.6)
HCV AB S/CO SERPL IA: <0.1 S/CO RATIO (ref 0–0.9)
HDLC SERPL-MCNC: 52 MG/DL (ref 40–60)
HGB BLD-MCNC: 11.4 G/DL (ref 12–15.9)
IMM GRANULOCYTES # BLD AUTO: 0.05 10*3/MM3 (ref 0–0.05)
IMM GRANULOCYTES NFR BLD AUTO: 0.6 % (ref 0–0.5)
LDLC SERPL CALC-MCNC: 120 MG/DL (ref 0–100)
LYMPHOCYTES # BLD AUTO: 2.31 10*3/MM3 (ref 0.7–3.1)
LYMPHOCYTES NFR BLD AUTO: 26.5 % (ref 19.6–45.3)
MCH RBC QN AUTO: 26.9 PG (ref 26.6–33)
MCHC RBC AUTO-ENTMCNC: 32.9 G/DL (ref 31.5–35.7)
MCV RBC AUTO: 81.8 FL (ref 79–97)
MONOCYTES # BLD AUTO: 0.6 10*3/MM3 (ref 0.1–0.9)
MONOCYTES NFR BLD AUTO: 6.9 % (ref 5–12)
NEUTROPHILS # BLD AUTO: 5.6 10*3/MM3 (ref 1.7–7)
NEUTROPHILS NFR BLD AUTO: 64.2 % (ref 42.7–76)
NRBC BLD AUTO-RTO: 0 /100 WBC (ref 0–0.2)
PLATELET # BLD AUTO: 275 10*3/MM3 (ref 140–450)
POTASSIUM SERPL-SCNC: 4.3 MMOL/L (ref 3.5–5.2)
PROT SERPL-MCNC: 6.9 G/DL (ref 6–8.5)
RBC # BLD AUTO: 4.24 10*6/MM3 (ref 3.77–5.28)
SODIUM SERPL-SCNC: 139 MMOL/L (ref 136–145)
TRIGL SERPL-MCNC: 286 MG/DL (ref 0–150)
TSH SERPL DL<=0.005 MIU/L-ACNC: 4.47 UIU/ML (ref 0.27–4.2)
VIT B12 SERPL-MCNC: 592 PG/ML (ref 211–946)
VLDLC SERPL CALC-MCNC: 50 MG/DL (ref 5–40)
WBC # BLD AUTO: 8.72 10*3/MM3 (ref 3.4–10.8)

## 2020-12-27 DIAGNOSIS — N92.0 MENORRHAGIA WITH REGULAR CYCLE: ICD-10-CM

## 2020-12-28 RX ORDER — NORGESTIMATE AND ETHINYL ESTRADIOL 7DAYSX3 LO
1 KIT ORAL DAILY
Qty: 28 TABLET | Refills: 2 | OUTPATIENT
Start: 2020-12-28 | End: 2021-12-28

## 2021-01-26 ENCOUNTER — TELEMEDICINE (OUTPATIENT)
Dept: INTERNAL MEDICINE | Facility: CLINIC | Age: 31
End: 2021-01-26

## 2021-01-26 DIAGNOSIS — F33.1 MODERATE EPISODE OF RECURRENT MAJOR DEPRESSIVE DISORDER (HCC): ICD-10-CM

## 2021-01-26 DIAGNOSIS — L30.9 HAND DERMATITIS: ICD-10-CM

## 2021-01-26 DIAGNOSIS — F41.9 ANXIETY: Primary | ICD-10-CM

## 2021-01-26 PROCEDURE — 99214 OFFICE O/P EST MOD 30 MIN: CPT | Performed by: NURSE PRACTITIONER

## 2021-01-26 RX ORDER — TRIAMCINOLONE ACETONIDE 5 MG/G
OINTMENT TOPICAL 2 TIMES DAILY
Qty: 30 G | Refills: 5 | Status: SHIPPED | OUTPATIENT
Start: 2021-01-26 | End: 2022-09-29

## 2021-01-26 NOTE — PROGRESS NOTES
Subjective   Hoa Lewis is a 30 y.o. female    Chief Complaint   Patient presents with   • Anxiety   • Depression   • Rash     History of Present Illness     You have chosen to receive care through a telehealth visit.  Do you consent to use a video/audio connection for your medical care today? Yes    This was an audio and video enabled telemedicine encounter.    Anxiety/depression -  Celexa was increased to 20 mg daily at last visit.  States that this has greatly improved her mood.  She is feeling less anxious and less down.  She denies any med SE's.  She wishes to continue at the current dose.    Pt also c/o erythematous rash on her bilateral hands/fingertips.  She sent a [facility] message a week ago concerning this and I suggested OTC hydrocortisone ointment with aquaphor to be worn under spa gloves at night.  States that she has been applying this mixture, but she has not been able to find the gloves.  She would like to see an allergist b/c she feels this could be due to an allergy.         The following portions of the patient's history were reviewed and updated as appropriate: allergies, current medications, past family history, past medical history, past social history, past surgical history and problem list.    Current Outpatient Medications:   •  citalopram (CeleXA) 20 MG tablet, Take 1 tablet by mouth Daily., Disp: 90 tablet, Rfl: 1  •  hydrOXYzine (ATARAX) 10 MG tablet, Take 1 tablet by mouth 3 (Three) Times a Day As Needed for Anxiety., Disp: 90 tablet, Rfl: 2  •  indomethacin (INDOCIN) 50 MG capsule, Take 1 capsule by mouth 3 (Three) Times a Day As Needed for Mild Pain  (take with food)., Disp: 90 capsule, Rfl: 1  •  norgestimate-ethinyl estradiol (Ortho Tri-Cyclen Lo) 0.18/0.215/0.25 MG-25 MCG per tablet, Take 1 tablet by mouth Daily., Disp: 28 tablet, Rfl: 2  •  triamcinolone (KENALOG) 0.5 % ointment, Apply  topically to the appropriate area as directed 2 (Two) Times a Day., Disp: 30 g, Rfl:  5  •  vitamin B-12 (CYANOCOBALAMIN) 1000 MCG tablet, Take 1 tablet by mouth Daily., Disp: 90 tablet, Rfl: 1     Review of Systems   Constitutional: Negative for chills, fatigue and fever.   Respiratory: Negative for cough, chest tightness and shortness of breath.    Cardiovascular: Negative for chest pain.   Gastrointestinal: Negative for abdominal pain, diarrhea, nausea and vomiting.   Endocrine: Negative for cold intolerance and heat intolerance.   Musculoskeletal: Negative for arthralgias.   Skin: Positive for rash.   Neurological: Negative for dizziness.       Objective   Physical Exam  Constitutional:       Appearance: Normal appearance.   HENT:      Head: Normocephalic.      Nose: Nose normal.      Mouth/Throat:      Mouth: Mucous membranes are moist.   Eyes:      Pupils: Pupils are equal, round, and reactive to light.   Neck:      Musculoskeletal: Normal range of motion.   Pulmonary:      Effort: Pulmonary effort is normal.   Musculoskeletal: Normal range of motion.   Skin:     Findings: Rash (bilateral fingertips with erythematous maculopapular rash) present.   Neurological:      General: No focal deficit present.      Mental Status: She is alert and oriented to person, place, and time.   Psychiatric:         Mood and Affect: Mood normal.         Behavior: Behavior normal.       There were no vitals filed for this visit.      Assessment/Plan   Diagnoses and all orders for this visit:    1. Anxiety (Primary)    2. Moderate episode of recurrent major depressive disorder (CMS/[facility])    3. Hand dermatitis  -     Ambulatory Referral to Allergy  -     triamcinolone (KENALOG) 0.5 % ointment; Apply  topically to the appropriate area as directed 2 (Two) Times a Day.  Dispense: 30 g; Refill: 5      Continue with Celexa 20 mg daily as directed  Triamcinolone ointment as directed  Referred to allergist per pt request  Return in about 6 months (around 7/26/2021).

## 2021-01-28 DIAGNOSIS — L30.9 HAND DERMATITIS: Primary | ICD-10-CM

## 2021-02-13 DIAGNOSIS — N92.0 MENORRHAGIA WITH REGULAR CYCLE: ICD-10-CM

## 2021-02-16 RX ORDER — NORGESTIMATE AND ETHINYL ESTRADIOL
KIT
Qty: 28 TABLET | Refills: 2 | Status: SHIPPED | OUTPATIENT
Start: 2021-02-16 | End: 2021-05-07

## 2021-02-24 DIAGNOSIS — F41.9 ANXIETY: ICD-10-CM

## 2021-02-24 DIAGNOSIS — F33.1 MODERATE EPISODE OF RECURRENT MAJOR DEPRESSIVE DISORDER (HCC): ICD-10-CM

## 2021-02-24 RX ORDER — CITALOPRAM 10 MG/1
10 TABLET ORAL DAILY
Qty: 30 TABLET | Refills: 2 | OUTPATIENT
Start: 2021-02-24

## 2021-02-24 NOTE — TELEPHONE ENCOUNTER
Last Office Visit: 01/26/2021 - telemedicine   Next Office Visit: None     Labs completed in past 6 months? yes  Labs completed in past year? yes    Last Refill Date: 10/29/2020  Quantity: 90  Refills: 1    Pharmacy:  [facility] #[id number] - [place], [place] - [address] AT [facility] [address] [facility] - [phone] [facility] [phone] FX

## 2021-02-25 RX ORDER — INDOMETHACIN 50 MG/1
50 CAPSULE ORAL 3 TIMES DAILY PRN
Qty: 90 CAPSULE | Refills: 1 | Status: SHIPPED | OUTPATIENT
Start: 2021-02-25 | End: 2021-07-08

## 2021-03-04 RX ORDER — CHOLECALCIFEROL (VITAMIN D3) 1250 MCG
CAPSULE ORAL
Qty: 8 CAPSULE | Refills: 0 | Status: SHIPPED | OUTPATIENT
Start: 2021-03-04 | End: 2022-09-29

## 2021-05-04 ENCOUNTER — PATIENT MESSAGE (OUTPATIENT)
Dept: INTERNAL MEDICINE | Facility: CLINIC | Age: 31
End: 2021-05-04

## 2021-05-05 NOTE — TELEPHONE ENCOUNTER
From: Hoa Lewis  To: SHARATH Cerda  Sent: 5/4/2021 8:45 PM EDT  Subject: Prescription Question    Hi! Would it be possible to see if I could upgrade my citalopram to a higher dosage? I think I've started to level off with what I currently take.

## 2021-05-06 RX ORDER — CITALOPRAM 40 MG/1
40 TABLET ORAL DAILY
Qty: 30 TABLET | Refills: 2 | Status: SHIPPED | OUTPATIENT
Start: 2021-05-06 | End: 2021-08-03

## 2021-05-07 DIAGNOSIS — N92.0 MENORRHAGIA WITH REGULAR CYCLE: ICD-10-CM

## 2021-05-07 RX ORDER — NORGESTIMATE AND ETHINYL ESTRADIOL
KIT
Qty: 28 TABLET | Refills: 2 | Status: SHIPPED | OUTPATIENT
Start: 2021-05-07 | End: 2021-09-27

## 2021-05-07 NOTE — TELEPHONE ENCOUNTER
Last Office Visit: 1/26/2021 (TeleHealth)  Next Office Visit: 6/1/2021       Labs completed in past 6 months? yes  Labs completed in past year? yes     Medication: TRI-LO-SPRINTEC TABLETS 28S  Last Refill Date: 4/13/2021  Quantity: 28  Refills: 2       Pharmacy: [facility] #[id number] - [place], [place] - [address] AT [facility] [address] [facility] [address] [facility] [phone] [facility] [phone] FX

## 2021-06-01 ENCOUNTER — TELEMEDICINE (OUTPATIENT)
Dept: INTERNAL MEDICINE | Facility: CLINIC | Age: 31
End: 2021-06-01

## 2021-06-01 DIAGNOSIS — F41.9 ANXIETY: Primary | ICD-10-CM

## 2021-06-01 DIAGNOSIS — F33.1 MODERATE EPISODE OF RECURRENT MAJOR DEPRESSIVE DISORDER (HCC): ICD-10-CM

## 2021-06-01 PROCEDURE — 99214 OFFICE O/P EST MOD 30 MIN: CPT | Performed by: NURSE PRACTITIONER

## 2021-06-01 RX ORDER — BUPROPION HYDROCHLORIDE 150 MG/1
150 TABLET ORAL EVERY MORNING
Qty: 90 TABLET | Refills: 1 | Status: SHIPPED | OUTPATIENT
Start: 2021-06-01 | End: 2021-08-06 | Stop reason: SDUPTHER

## 2021-06-01 NOTE — PROGRESS NOTES
Subjective   Hoa Lewis is a 30 y.o. female    Chief Complaint   Patient presents with   • Anxiety     f/u on increase in Celexa     This was an audio and video enabled telemedicine encounter.    Anxiety  Presents for follow-up visit. Symptoms include depressed mood, excessive worry and nervous/anxious behavior. Patient reports no chest pain, compulsions, confusion, decreased concentration, dizziness, dry mouth, feeling of choking, hyperventilation, impotence, insomnia, malaise, muscle tension, nausea, obsessions, palpitations, panic, restlessness, shortness of breath or suicidal ideas. Symptoms occur most days. The severity of symptoms is moderate. The quality of sleep is good. Nighttime awakenings: occasional.     Compliance with medications is %. Treatment side effects: no medication SE's.          The following portions of the patient's history were reviewed and updated as appropriate: allergies, current medications, past family history, past medical history, past social history, past surgical history and problem list.    Current Outpatient Medications:   •  buPROPion XL (Wellbutrin XL) 150 MG 24 hr tablet, Take 1 tablet by mouth Every Morning., Disp: 90 tablet, Rfl: 1  •  Cholecalciferol (Vitamin D3) 1.25 MG (00134 UT) capsule, TAKE 1 CAPSULE BY MOUTH EVERY 7 DAYS FOR 8 DOSES, Disp: 8 capsule, Rfl: 0  •  citalopram (CeleXA) 40 MG tablet, Take 1 tablet by mouth Daily., Disp: 30 tablet, Rfl: 2  •  hydrOXYzine (ATARAX) 10 MG tablet, Take 1 tablet by mouth 3 (Three) Times a Day As Needed for Anxiety., Disp: 90 tablet, Rfl: 2  •  indomethacin (INDOCIN) 50 MG capsule, Take 1 capsule by mouth 3 (Three) Times a Day As Needed for Mild Pain  (take with food)., Disp: 90 capsule, Rfl: 1  •  Tri-Lo-Sprintec 0.18/0.215/0.25 MG-25 MCG per tablet, TAKE 1 TABLET BY MOUTH DAILY, Disp: 28 tablet, Rfl: 2  •  triamcinolone (KENALOG) 0.5 % ointment, Apply  topically to the appropriate area as directed 2 (Two) Times a  Day., Disp: 30 g, Rfl: 5  •  vitamin B-12 (CYANOCOBALAMIN) 1000 MCG tablet, Take 1 tablet by mouth Daily., Disp: 90 tablet, Rfl: 1     Review of Systems   Constitutional: Negative for chills, fatigue and fever.   Respiratory: Negative for cough, chest tightness and shortness of breath.    Cardiovascular: Negative for chest pain and palpitations.   Gastrointestinal: Negative for abdominal pain, diarrhea, nausea and vomiting.   Endocrine: Negative for cold intolerance and heat intolerance.   Genitourinary: Negative for impotence.   Musculoskeletal: Negative for arthralgias.   Neurological: Negative for dizziness.   Psychiatric/Behavioral: Negative for confusion, decreased concentration and suicidal ideas. The patient is nervous/anxious. The patient does not have insomnia.        Objective   Physical Exam  Constitutional:       Appearance: She is well-developed.   HENT:      Head: Normocephalic and atraumatic.   Eyes:      Conjunctiva/sclera: Conjunctivae normal.      Pupils: Pupils are equal, round, and reactive to light.   Cardiovascular:      Rate and Rhythm: Normal rate and regular rhythm.      Heart sounds: Normal heart sounds.   Pulmonary:      Effort: Pulmonary effort is normal.      Breath sounds: Normal breath sounds.   Abdominal:      General: Bowel sounds are normal.      Palpations: Abdomen is soft.   Musculoskeletal:         General: Normal range of motion.      Cervical back: Normal range of motion.   Skin:     General: Skin is warm and dry.   Neurological:      Mental Status: She is alert and oriented to person, place, and time.      Deep Tendon Reflexes: Reflexes are normal and symmetric.   Psychiatric:         Behavior: Behavior normal.         Thought Content: Thought content normal.         Judgment: Judgment normal.       There were no vitals filed for this visit.      Assessment/Plan   Diagnoses and all orders for this visit:    1. Anxiety (Primary)  -     Ambulatory Referral to Psychology  -      buPROPion XL (Wellbutrin XL) 150 MG 24 hr tablet; Take 1 tablet by mouth Every Morning.  Dispense: 90 tablet; Refill: 1    2. Moderate episode of recurrent major depressive disorder (CMS/HCC)  -     Ambulatory Referral to Psychology  -     buPROPion XL (Wellbutrin XL) 150 MG 24 hr tablet; Take 1 tablet by mouth Every Morning.  Dispense: 90 tablet; Refill: 1      Referred for counseling/therapy  Wellbutrin added  Continue with Celexa at 40 mg daily  Return in about 4 weeks (around 6/29/2021).

## 2021-06-05 DIAGNOSIS — F33.1 MODERATE EPISODE OF RECURRENT MAJOR DEPRESSIVE DISORDER (HCC): ICD-10-CM

## 2021-06-05 DIAGNOSIS — F41.9 ANXIETY: ICD-10-CM

## 2021-06-07 RX ORDER — CITALOPRAM 20 MG/1
20 TABLET ORAL DAILY
Qty: 90 TABLET | Refills: 1 | OUTPATIENT
Start: 2021-06-07

## 2021-07-08 RX ORDER — INDOMETHACIN 50 MG/1
CAPSULE ORAL
Qty: 90 CAPSULE | Refills: 1 | Status: SHIPPED | OUTPATIENT
Start: 2021-07-08 | End: 2021-09-03

## 2021-08-03 RX ORDER — CITALOPRAM 40 MG/1
40 TABLET ORAL DAILY
Qty: 30 TABLET | Refills: 0 | Status: SHIPPED | OUTPATIENT
Start: 2021-08-03 | End: 2021-08-06 | Stop reason: SDUPTHER

## 2021-08-04 DIAGNOSIS — N92.0 MENORRHAGIA WITH REGULAR CYCLE: ICD-10-CM

## 2021-08-05 RX ORDER — NORGESTIMATE AND ETHINYL ESTRADIOL
KIT
Qty: 28 TABLET | Refills: 2 | OUTPATIENT
Start: 2021-08-05

## 2021-08-06 ENCOUNTER — TELEMEDICINE (OUTPATIENT)
Dept: INTERNAL MEDICINE | Facility: CLINIC | Age: 31
End: 2021-08-06

## 2021-08-06 DIAGNOSIS — E55.9 VITAMIN D DEFICIENCY: ICD-10-CM

## 2021-08-06 DIAGNOSIS — R63.5 WEIGHT GAIN: ICD-10-CM

## 2021-08-06 DIAGNOSIS — F33.1 MODERATE EPISODE OF RECURRENT MAJOR DEPRESSIVE DISORDER (HCC): ICD-10-CM

## 2021-08-06 DIAGNOSIS — E53.8 B12 DEFICIENCY: ICD-10-CM

## 2021-08-06 DIAGNOSIS — Z83.49: ICD-10-CM

## 2021-08-06 DIAGNOSIS — F41.9 ANXIETY: Primary | ICD-10-CM

## 2021-08-06 PROCEDURE — 99214 OFFICE O/P EST MOD 30 MIN: CPT | Performed by: NURSE PRACTITIONER

## 2021-08-06 RX ORDER — CITALOPRAM 40 MG/1
40 TABLET ORAL DAILY
Qty: 90 TABLET | Refills: 1 | Status: SHIPPED | OUTPATIENT
Start: 2021-08-06 | End: 2022-03-17

## 2021-08-06 RX ORDER — BUPROPION HYDROCHLORIDE 150 MG/1
150 TABLET ORAL EVERY MORNING
Qty: 90 TABLET | Refills: 1 | Status: SHIPPED | OUTPATIENT
Start: 2021-08-06 | End: 2022-03-04

## 2021-08-06 NOTE — PROGRESS NOTES
Subjective   Hoa Lewis is a 30 y.o. female    Chief Complaint   Patient presents with   • Anxiety   • Referral to Endo     due to fam h/o acromegaly     This was an audio and video enabled telemedicine encounter.    You have chosen to receive care through a telehealth visit.  Do you consent to use a video/audio connection for your medical care today? Yes      Anxiety  Presents for follow-up visit. Patient reports no chest pain, compulsions, confusion, decreased concentration, depressed mood, dizziness, dry mouth, excessive worry, feeling of choking, hyperventilation, impotence, insomnia, irritability, malaise, muscle tension, nausea, nervous/anxious behavior, obsessions, palpitations, panic, restlessness, shortness of breath or suicidal ideas. Symptoms occur occasionally. The severity of symptoms is mild. The quality of sleep is good. Nighttime awakenings: occasional.     Compliance with medications is %. Treatment side effects: denies med SE.   Current med regimen is Celexa 40 mg daily along with Wellbutrin  mg     Pt requests referral to Endocrinology.  There is a family h/o acromegaly.  She is experiencing HA's, weight gain, skin issues (tags)    The following portions of the patient's history were reviewed and updated as appropriate: allergies, current medications, past family history, past medical history, past social history, past surgical history and problem list.    Current Outpatient Medications:   •  buPROPion XL (Wellbutrin XL) 150 MG 24 hr tablet, Take 1 tablet by mouth Every Morning., Disp: 90 tablet, Rfl: 1  •  Cholecalciferol (Vitamin D3) 1.25 MG (34769 UT) capsule, TAKE 1 CAPSULE BY MOUTH EVERY 7 DAYS FOR 8 DOSES, Disp: 8 capsule, Rfl: 0  •  citalopram (CeleXA) 40 MG tablet, Take 1 tablet by mouth Daily., Disp: 90 tablet, Rfl: 1  •  hydrOXYzine (ATARAX) 10 MG tablet, Take 1 tablet by mouth 3 (Three) Times a Day As Needed for Anxiety., Disp: 90 tablet, Rfl: 2  •  indomethacin  (INDOCIN) 50 MG capsule, TAKE 1 CAPSULE BY MOUTH THREE TIMES DAILY WITH FOOD AS NEEDED FOR MILD PAIN, Disp: 90 capsule, Rfl: 1  •  Tri-Lo-Sprintec 0.18/0.215/0.25 MG-25 MCG per tablet, TAKE 1 TABLET BY MOUTH DAILY, Disp: 28 tablet, Rfl: 2  •  triamcinolone (KENALOG) 0.5 % ointment, Apply  topically to the appropriate area as directed 2 (Two) Times a Day., Disp: 30 g, Rfl: 5  •  vitamin B-12 (CYANOCOBALAMIN) 1000 MCG tablet, Take 1 tablet by mouth Daily., Disp: 90 tablet, Rfl: 1     Review of Systems   Constitutional: Positive for fatigue and unexpected weight change. Negative for chills, fever and irritability.   Respiratory: Negative for cough, chest tightness and shortness of breath.    Cardiovascular: Negative for chest pain and palpitations.   Gastrointestinal: Negative for abdominal pain, diarrhea, nausea and vomiting.   Endocrine: Negative for cold intolerance and heat intolerance.   Genitourinary: Negative for impotence.   Musculoskeletal: Positive for arthralgias and myalgias.   Neurological: Positive for headaches. Negative for dizziness.   Psychiatric/Behavioral: Negative for confusion, decreased concentration and suicidal ideas. The patient is not nervous/anxious and does not have insomnia.        Objective   Physical Exam  Constitutional:       Appearance: Normal appearance.   HENT:      Head: Normocephalic.      Nose: Nose normal.      Mouth/Throat:      Mouth: Mucous membranes are moist.   Eyes:      Pupils: Pupils are equal, round, and reactive to light.   Cardiovascular:      Pulses: Normal pulses.   Musculoskeletal:         General: Normal range of motion.      Cervical back: Normal range of motion.   Neurological:      General: No focal deficit present.      Mental Status: She is alert and oriented to person, place, and time.   Psychiatric:         Mood and Affect: Mood normal.         Behavior: Behavior normal.       There were no vitals filed for this visit.      Assessment/Plan   Diagnoses and  all orders for this visit:    1. Anxiety (Primary)  -     CBC & Differential; Future  -     Comprehensive Metabolic Panel; Future  -     Lipid Panel; Future  -     TSH; Future  -     Vitamin B12; Future  -     Vitamin D 25 Hydroxy; Future  -     buPROPion XL (Wellbutrin XL) 150 MG 24 hr tablet; Take 1 tablet by mouth Every Morning.  Dispense: 90 tablet; Refill: 1  -     citalopram (CeleXA) 40 MG tablet; Take 1 tablet by mouth Daily.  Dispense: 90 tablet; Refill: 1    2. Moderate episode of recurrent major depressive disorder (CMS/HCC)  -     CBC & Differential; Future  -     Comprehensive Metabolic Panel; Future  -     Lipid Panel; Future  -     TSH; Future  -     Vitamin B12; Future  -     Vitamin D 25 Hydroxy; Future  -     buPROPion XL (Wellbutrin XL) 150 MG 24 hr tablet; Take 1 tablet by mouth Every Morning.  Dispense: 90 tablet; Refill: 1  -     citalopram (CeleXA) 40 MG tablet; Take 1 tablet by mouth Daily.  Dispense: 90 tablet; Refill: 1    3. Family history of acromegaly  -     CBC & Differential; Future  -     Comprehensive Metabolic Panel; Future  -     Lipid Panel; Future  -     TSH; Future  -     Vitamin B12; Future  -     Vitamin D 25 Hydroxy; Future  -     Prolactin; Future  -     Growth Hormone; Future  -     Insulin-like Growth Factor; Future    4. Vitamin D deficiency  -     CBC & Differential; Future  -     Comprehensive Metabolic Panel; Future  -     Lipid Panel; Future  -     TSH; Future  -     Vitamin B12; Future  -     Vitamin D 25 Hydroxy; Future    5. B12 deficiency  -     CBC & Differential; Future  -     Comprehensive Metabolic Panel; Future  -     Lipid Panel; Future  -     TSH; Future  -     Vitamin B12; Future  -     Vitamin D 25 Hydroxy; Future    6. Weight gain  -     CBC & Differential; Future  -     Comprehensive Metabolic Panel; Future  -     Lipid Panel; Future  -     TSH; Future  -     Vitamin B12; Future  -     Vitamin D 25 Hydroxy; Future  -     Prolactin; Future  -     Growth  Hormone; Future  -     Insulin-like Growth Factor; Future    Continue with Celexa 40 mg daily and Wellbutrin  daily for anxiety  Labs are entered and she will present to the clinic to have these drawn  Will refer to endo after results reviewed  Return for needs PE scheduled after 12/15/2021.

## 2021-08-20 ENCOUNTER — TELEMEDICINE (OUTPATIENT)
Dept: PSYCHIATRY | Facility: CLINIC | Age: 31
End: 2021-08-20

## 2021-08-20 DIAGNOSIS — F33.1 MODERATE EPISODE OF RECURRENT MAJOR DEPRESSIVE DISORDER (HCC): Primary | ICD-10-CM

## 2021-08-20 DIAGNOSIS — F41.1 GENERALIZED ANXIETY DISORDER: ICD-10-CM

## 2021-08-20 PROCEDURE — 90791 PSYCH DIAGNOSTIC EVALUATION: CPT | Performed by: COUNSELOR
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
## 2021-08-27 ENCOUNTER — LAB (OUTPATIENT)
Dept: LAB | Facility: HOSPITAL | Age: 31
End: 2021-08-27

## 2021-08-27 DIAGNOSIS — Z83.49: ICD-10-CM

## 2021-08-27 DIAGNOSIS — E55.9 VITAMIN D DEFICIENCY: ICD-10-CM

## 2021-08-27 DIAGNOSIS — R63.5 WEIGHT GAIN: ICD-10-CM

## 2021-08-27 DIAGNOSIS — E53.8 B12 DEFICIENCY: ICD-10-CM

## 2021-08-27 DIAGNOSIS — F41.9 ANXIETY: ICD-10-CM

## 2021-08-27 DIAGNOSIS — F33.1 MODERATE EPISODE OF RECURRENT MAJOR DEPRESSIVE DISORDER (HCC): ICD-10-CM

## 2021-08-27 LAB
25(OH)D3 SERPL-MCNC: 35.1 NG/ML (ref 30–100)
ALBUMIN SERPL-MCNC: 3.8 G/DL (ref 3.5–5.2)
ALBUMIN/GLOB SERPL: 1.2 G/DL
ALP SERPL-CCNC: 70 U/L (ref 39–117)
ALT SERPL W P-5'-P-CCNC: 13 U/L (ref 1–33)
ANION GAP SERPL CALCULATED.3IONS-SCNC: 9.1 MMOL/L (ref 5–15)
AST SERPL-CCNC: 9 U/L (ref 1–32)
BASOPHILS # BLD AUTO: 0.07 10*3/MM3 (ref 0–0.2)
BASOPHILS NFR BLD AUTO: 0.8 % (ref 0–1.5)
BILIRUB SERPL-MCNC: 0.3 MG/DL (ref 0–1.2)
BUN SERPL-MCNC: 9 MG/DL (ref 6–20)
BUN/CREAT SERPL: 14.3 (ref 7–25)
CALCIUM SPEC-SCNC: 8.8 MG/DL (ref 8.6–10.5)
CHLORIDE SERPL-SCNC: 104 MMOL/L (ref 98–107)
CHOLEST SERPL-MCNC: 222 MG/DL (ref 0–200)
CO2 SERPL-SCNC: 22.9 MMOL/L (ref 22–29)
CREAT SERPL-MCNC: 0.63 MG/DL (ref 0.57–1)
DEPRECATED RDW RBC AUTO: 43.4 FL (ref 37–54)
EOSINOPHIL # BLD AUTO: 0.15 10*3/MM3 (ref 0–0.4)
EOSINOPHIL NFR BLD AUTO: 1.8 % (ref 0.3–6.2)
ERYTHROCYTE [DISTWIDTH] IN BLOOD BY AUTOMATED COUNT: 13.9 % (ref 12.3–15.4)
GFR SERPL CREATININE-BSD FRML MDRD: 112 ML/MIN/1.73
GLOBULIN UR ELPH-MCNC: 3.1 GM/DL
GLUCOSE SERPL-MCNC: 86 MG/DL (ref 65–99)
HCT VFR BLD AUTO: 34.6 % (ref 34–46.6)
HDLC SERPL-MCNC: 68 MG/DL (ref 40–60)
HGB BLD-MCNC: 10.8 G/DL (ref 12–15.9)
IMM GRANULOCYTES # BLD AUTO: 0.06 10*3/MM3 (ref 0–0.05)
IMM GRANULOCYTES NFR BLD AUTO: 0.7 % (ref 0–0.5)
LDLC SERPL CALC-MCNC: 111 MG/DL (ref 0–100)
LDLC/HDLC SERPL: 1.52 {RATIO}
LYMPHOCYTES # BLD AUTO: 2.73 10*3/MM3 (ref 0.7–3.1)
LYMPHOCYTES NFR BLD AUTO: 32.1 % (ref 19.6–45.3)
MCH RBC QN AUTO: 26.4 PG (ref 26.6–33)
MCHC RBC AUTO-ENTMCNC: 31.2 G/DL (ref 31.5–35.7)
MCV RBC AUTO: 84.6 FL (ref 79–97)
MONOCYTES # BLD AUTO: 0.49 10*3/MM3 (ref 0.1–0.9)
MONOCYTES NFR BLD AUTO: 5.8 % (ref 5–12)
NEUTROPHILS NFR BLD AUTO: 5 10*3/MM3 (ref 1.7–7)
NEUTROPHILS NFR BLD AUTO: 58.8 % (ref 42.7–76)
NRBC BLD AUTO-RTO: 0 /100 WBC (ref 0–0.2)
PLATELET # BLD AUTO: 332 10*3/MM3 (ref 140–450)
PMV BLD AUTO: 10 FL (ref 6–12)
POTASSIUM SERPL-SCNC: 4.3 MMOL/L (ref 3.5–5.2)
PROT SERPL-MCNC: 6.9 G/DL (ref 6–8.5)
RBC # BLD AUTO: 4.09 10*6/MM3 (ref 3.77–5.28)
SODIUM SERPL-SCNC: 136 MMOL/L (ref 136–145)
TRIGL SERPL-MCNC: 253 MG/DL (ref 0–150)
TSH SERPL DL<=0.05 MIU/L-ACNC: 4.52 UIU/ML (ref 0.27–4.2)
VIT B12 BLD-MCNC: 488 PG/ML (ref 211–946)
VLDLC SERPL-MCNC: 43 MG/DL (ref 5–40)
WBC # BLD AUTO: 8.5 10*3/MM3 (ref 3.4–10.8)

## 2021-08-27 PROCEDURE — 84305 ASSAY OF SOMATOMEDIN: CPT | Performed by: NURSE PRACTITIONER

## 2021-08-27 PROCEDURE — 84146 ASSAY OF PROLACTIN: CPT | Performed by: NURSE PRACTITIONER

## 2021-08-27 PROCEDURE — 80053 COMPREHEN METABOLIC PANEL: CPT | Performed by: NURSE PRACTITIONER

## 2021-08-27 PROCEDURE — 80061 LIPID PANEL: CPT | Performed by: NURSE PRACTITIONER

## 2021-08-27 PROCEDURE — 36415 COLL VENOUS BLD VENIPUNCTURE: CPT

## 2021-08-27 PROCEDURE — 84443 ASSAY THYROID STIM HORMONE: CPT | Performed by: NURSE PRACTITIONER

## 2021-08-27 PROCEDURE — 82607 VITAMIN B-12: CPT | Performed by: NURSE PRACTITIONER

## 2021-08-27 PROCEDURE — 85025 COMPLETE CBC W/AUTO DIFF WBC: CPT | Performed by: NURSE PRACTITIONER

## 2021-08-27 PROCEDURE — 83003 ASSAY GROWTH HORMONE (HGH): CPT | Performed by: NURSE PRACTITIONER

## 2021-08-27 PROCEDURE — 82306 VITAMIN D 25 HYDROXY: CPT | Performed by: NURSE PRACTITIONER

## 2021-09-02 DIAGNOSIS — Z83.49: ICD-10-CM

## 2021-09-02 DIAGNOSIS — R79.89 ELEVATED PROLACTIN LEVEL: Primary | ICD-10-CM

## 2021-09-03 RX ORDER — INDOMETHACIN 50 MG/1
CAPSULE ORAL
Qty: 90 CAPSULE | Refills: 1 | Status: SHIPPED | OUTPATIENT
Start: 2021-09-03 | End: 2022-03-17

## 2021-09-07 ENCOUNTER — LAB (OUTPATIENT)
Dept: LAB | Facility: HOSPITAL | Age: 31
End: 2021-09-07

## 2021-09-07 DIAGNOSIS — D64.9 ANEMIA, UNSPECIFIED TYPE: ICD-10-CM

## 2021-09-07 DIAGNOSIS — D64.9 ANEMIA, UNSPECIFIED TYPE: Primary | ICD-10-CM

## 2021-09-07 LAB
BASOPHILS # BLD AUTO: 0.07 10*3/MM3 (ref 0–0.2)
BASOPHILS NFR BLD AUTO: 1 % (ref 0–1.5)
DEPRECATED RDW RBC AUTO: 39.7 FL (ref 37–54)
EOSINOPHIL # BLD AUTO: 0.13 10*3/MM3 (ref 0–0.4)
EOSINOPHIL NFR BLD AUTO: 1.8 % (ref 0.3–6.2)
ERYTHROCYTE [DISTWIDTH] IN BLOOD BY AUTOMATED COUNT: 13.5 % (ref 12.3–15.4)
FERRITIN SERPL-MCNC: 61.8 NG/ML (ref 13–150)
FOLATE SERPL-MCNC: 14 NG/ML (ref 4.78–24.2)
HCT VFR BLD AUTO: 35.9 % (ref 34–46.6)
HGB BLD-MCNC: 11.6 G/DL (ref 12–15.9)
IMM GRANULOCYTES # BLD AUTO: 0.04 10*3/MM3 (ref 0–0.05)
IMM GRANULOCYTES NFR BLD AUTO: 0.6 % (ref 0–0.5)
IRON 24H UR-MRATE: 65 MCG/DL (ref 37–145)
IRON SATN MFR SERPL: 15 % (ref 20–50)
LYMPHOCYTES # BLD AUTO: 2.52 10*3/MM3 (ref 0.7–3.1)
LYMPHOCYTES NFR BLD AUTO: 35.2 % (ref 19.6–45.3)
MCH RBC QN AUTO: 26.3 PG (ref 26.6–33)
MCHC RBC AUTO-ENTMCNC: 32.3 G/DL (ref 31.5–35.7)
MCV RBC AUTO: 81.4 FL (ref 79–97)
MONOCYTES # BLD AUTO: 0.48 10*3/MM3 (ref 0.1–0.9)
MONOCYTES NFR BLD AUTO: 6.7 % (ref 5–12)
NEUTROPHILS NFR BLD AUTO: 3.91 10*3/MM3 (ref 1.7–7)
NEUTROPHILS NFR BLD AUTO: 54.7 % (ref 42.7–76)
NRBC BLD AUTO-RTO: 0.1 /100 WBC (ref 0–0.2)
PLATELET # BLD AUTO: 359 10*3/MM3 (ref 140–450)
PMV BLD AUTO: 9.8 FL (ref 6–12)
RBC # BLD AUTO: 4.41 10*6/MM3 (ref 3.77–5.28)
TIBC SERPL-MCNC: 446 MCG/DL (ref 298–536)
TRANSFERRIN SERPL-MCNC: 299 MG/DL (ref 200–360)
WBC # BLD AUTO: 7.15 10*3/MM3 (ref 3.4–10.8)

## 2021-09-07 PROCEDURE — 82746 ASSAY OF FOLIC ACID SERUM: CPT | Performed by: NURSE PRACTITIONER

## 2021-09-07 PROCEDURE — 84466 ASSAY OF TRANSFERRIN: CPT | Performed by: NURSE PRACTITIONER

## 2021-09-07 PROCEDURE — 82728 ASSAY OF FERRITIN: CPT | Performed by: NURSE PRACTITIONER

## 2021-09-07 PROCEDURE — 85025 COMPLETE CBC W/AUTO DIFF WBC: CPT | Performed by: NURSE PRACTITIONER

## 2021-09-07 PROCEDURE — 83540 ASSAY OF IRON: CPT | Performed by: NURSE PRACTITIONER

## 2021-09-24 ENCOUNTER — TELEMEDICINE (OUTPATIENT)
Dept: PSYCHIATRY | Facility: CLINIC | Age: 31
End: 2021-09-24

## 2021-09-24 DIAGNOSIS — F33.1 MODERATE EPISODE OF RECURRENT MAJOR DEPRESSIVE DISORDER (HCC): Primary | ICD-10-CM

## 2021-09-24 DIAGNOSIS — F41.1 GENERALIZED ANXIETY DISORDER: ICD-10-CM

## 2021-09-24 PROCEDURE — 90832 PSYTX W PT 30 MINUTES: CPT | Performed by: COUNSELOR

## 2021-09-24 NOTE — PLAN OF CARE
Problem: Anxiety  Goal: Patient will develop and implement behavioral and cognitive strategies to reduce anxiety and irrational fears.  Outcome: Ongoing, Progressing     Problem: Depression  Goal: Patient will demonstrate the ability to initiate new constructive life skills outside of sessions on a consistent basis.  Outcome: Ongoing, Progressing

## 2021-09-24 NOTE — TREATMENT PLAN
Multi-Disciplinary Problems (from Behavioral Health Treatment Plan)    Active Problems     Problem: Anxiety  Start Date: 09/24/21    Problem Details: The patient self-scales this problem as a 4 with 10 being the worst.        Goal Priority Start Date Expected End Date End Date    Patient will develop and implement behavioral and cognitive strategies to reduce anxiety and irrational fears. -- 09/24/21 -- --    Goal Details: Progress toward goal:  Not appropriate to rate progress toward goal since this is the initial treatment plan.        Goal Intervention Frequency Start Date End Date    Help patient explore past emotional issues in relation to present anxiety. Q3 Weeks 09/24/21 --    Intervention Details: Duration of treatment until until remission of symptoms.        Goal Intervention Frequency Start Date End Date    Help patient develop an awareness of their cognitive and physical responses to anxiety. Q3 Weeks 09/24/21 --    Intervention Details: Duration of treatment until until remission of symptoms.              Problem: Depression  Start Date: 09/24/21    Problem Details: The patient self-scales this problem as a 6 with 10 being the worst.        Goal Priority Start Date Expected End Date End Date    Patient will demonstrate the ability to initiate new constructive life skills outside of sessions on a consistent basis. -- 09/24/21 -- --    Goal Details: Progress toward goal:  Not appropriate to rate progress toward goal since this is the initial treatment plan.        Goal Intervention Frequency Start Date End Date    Assist patient in setting attainable activities of daily living goals. PRN 09/24/21 --    Goal Intervention Frequency Start Date End Date    Provide education about depression PRN 09/24/21 --    Intervention Details: Duration of treatment until until remission of symptoms.        Goal Intervention Frequency Start Date End Date    Assist patient in developing healthy coping strategies. Weekly  09/24/21 --    Intervention Details: Duration of treatment until until remission of symptoms.                    Reviewed By     Hoa Bettencourt, [facility] 09/24/21 0996                 I have discussed and reviewed this treatment plan with the patient.

## 2021-09-24 NOTE — PROGRESS NOTES
Date: September 24, 2021  Time In: 0932  Time Out: 0952  This provider is located at the [facility] [address], [place] [address] using a secure Purfreshhart Video Visit through [employer]. Patient is being seen remotely via telehealth at home address in [place] and stated they are in a secure environment for this session. The patient's condition being diagnosed/treated is appropriate for telemedicine. The provider identified herself as well as her credentials. The patient, and/or patients guardian, consent to be seen remotely, and when consent is given they understand that the consent allows for patient identifiable information to be sent to a third party as needed. They may refuse to be seen remotely at any time. The electronic data is encrypted and password protected, and the patient and/or guardian has been advised of the potential risks to privacy not withstanding such measures.     You have chosen to receive care through a telehealth visit.  Do you consent to use a video/audio connection for your medical care today? Yes    PROGRESS NOTE  Data:  Hoa Lewis is a 31 y.o. female who presents today for follow up    Chief Complaint: Depression and Anxiety     History of Present Illness: Patient reports that she continues to feel depressed, hopeless, and like a burden. Patient reports that she doesn't necessary know what has caused her anxiety to increase since last session other than working additional hours. Patient reports that work provides her with a positive distraction from negative thoughts. Patient reports that when she is alone she tends to think of what ifs and regrets causing her to feel more depressed. Patient reports that she tries to spend time with friends, talk to her brother, take care of her cats, play video games, and browse the web for distraction methods. Treatment plan was created this date.       Clinical  Maneuvering/Intervention:    (Scales based on 0 - 10 with 10 being the worst)  Depression: 3-4 Anxiety: 6-7     DERRICK-7  Feeling nervous, anxious or on edge: (P) More than half the days  Not being able to stop or control worrying: (P) Several days  Worrying too much about different things: (P) Nearly every day  Trouble Relaxing: (P) More than half the days  Being so restless that it is hard to sit still: (P) Nearly every day  Feeling afraid as if something awful might happen: (P) Nearly every day  Becoming easily annoyed or irritable: (P) More than half the days  DERRICK 7 Total Score: (P) 16  If you checked any problems, how difficult have these problems made it for you to do your work, take care of things at home, or get along with other people: (P) Extremely difficult   PHQ-9 Total Score: (P) 26     Assisted patient in processing above session content; acknowledged and normalized patient’s thoughts, feelings, and concerns.  Rationalized patient thought process regarding using distraction methods as positive method of coping with negative thoughts.  Discussed triggers associated with patient's depression and anxiety.  Also discussed coping skills for patient to implement such as using a physical outlet, writing in a journal, and also asked Patient to identify 8-10 fun facts about herself to be discussed during next session.     Allowed patient to freely discuss issues without interruption or judgment. Provided safe, confidential environment to facilitate the development of positive therapeutic relationship and encourage open, honest communication. Assisted patient in identifying risk factors which would indicate the need for higher level of care including thoughts to harm self or others and/or self-harming behavior and encouraged patient to contact this office, call 911, or present to the nearest emergency room should any of these events occur. Discussed crisis intervention services and means to access. Patient  adamantly and convincingly denies current suicidal or homicidal ideation or perceptual disturbance.    Assessment:   Assessment   Patient appears to maintain relative stability as compared to their baseline.  However, patient continues to struggle with anxiety and depression which continues to cause impairment in important areas of functioning.  A result, they can be reasonably expected to continue to benefit from treatment and would likely be at increased risk for decompensation otherwise.    Mental Status Exam:   Hygiene:   good  Cooperation:  Cooperative  Eye Contact:  Good  Psychomotor Behavior:  Appropriate  Affect:  Restricted  Mood: depressed  Speech:  Minimal  Thought Process:  Linear  Thought Content:  Normal  Suicidal:  None  Homicidal:  None  Hallucinations:  None  Delusion:  None  Memory:  Intact  Orientation:  Person, Place, Time and Situation  Reliability:  fair  Insight:  Fair  Judgement:  Fair  Impulse Control:  Fair  Physical/Medical Issues:  No        Patient's Support Network Includes:  brother     Functional Status: Mild impairment     Progress toward goal: Not at goal    Prognosis: Fair with Ongoing Treatment          Plan:    Patient will continue in individual outpatient therapy with focus on improved functioning and coping skills, maintaining stability, and avoiding decompensation and the need for higher level of care.    Patient will adhere to medication regimen as prescribed and report any side effects. Patient will contact this office, call 911 or present to the nearest emergency room should suicidal or homicidal ideations occur. Provide Cognitive Behavioral Therapy and Solution Focused Therapy to improve functioning, maintain stability, and avoid decompensation and the need for higher level of care.     Return in about 3 weeks, or earlier if symptoms worsen or fail to improve.           VISIT DIAGNOSIS:     ICD-10-CM ICD-9-CM   1. Moderate episode of recurrent major depressive disorder  ([facility])  F33.1 296.32   2. Generalized anxiety disorder  F41.1 300.02          [facility] No Show Policy:  We understand unexpected circumstances arise; however, anytime you miss your appointment we are unable to provide you appropriate care.  In addition, each appointment missed could have been used to provide care for others.  We ask that you call at least 24 hours in advance to cancel or reschedule an appointment.  We would like to take this opportunity to remind you of our policy stating patients who miss THREE or more appointments without cancelling or rescheduling 24 hours in advance of the appointment may be subject to cancellation of any further visits with our clinic and recommendation to seek in-person services/visits.    Please call [phone] to reschedule your appointment. If there are reasons that make it difficult for you to keep the appointments, please call and let us know how we can help.  Please understand that medication prescribing will not continue without seeing your provider.      [facility]'s No Show Policy reviewed with patient at today's visit. Patient verbalized understanding of this policy. Discussed with patient that in the event that there are three or more no show visits, it will be recommended that they pursue in-person services/visits as noncompliance with telehealth visits indicates that patient is not an appropriate candidate for telemedicine and would likely be more appropriate for in-person services/visits. Patient verbalizes understanding and is agreeable to this.        This document has been electronically signed by Hoa Bettencourt LCSW  September 24, 2021 10:15 EDT      Part of this note may be an electronic transcription/translation of spoken language to printed text using the Dragon Dictation System.

## 2021-09-25 DIAGNOSIS — N92.0 MENORRHAGIA WITH REGULAR CYCLE: ICD-10-CM

## 2021-09-27 RX ORDER — NORGESTIMATE AND ETHINYL ESTRADIOL
KIT
Qty: 28 TABLET | Refills: 2 | Status: SHIPPED | OUTPATIENT
Start: 2021-09-27 | End: 2022-01-19

## 2021-10-22 ENCOUNTER — TELEMEDICINE (OUTPATIENT)
Dept: PSYCHIATRY | Facility: CLINIC | Age: 31
End: 2021-10-22

## 2021-10-22 DIAGNOSIS — F41.1 GENERALIZED ANXIETY DISORDER: ICD-10-CM

## 2021-10-22 DIAGNOSIS — F33.1 MODERATE EPISODE OF RECURRENT MAJOR DEPRESSIVE DISORDER (HCC): Primary | ICD-10-CM

## 2021-10-22 PROCEDURE — 90832 PSYTX W PT 30 MINUTES: CPT | Performed by: COUNSELOR

## 2021-10-22 NOTE — PROGRESS NOTES
Date: October 25, 2021  Time In: 1122  Time Out: 1140  This provider is located at the [facility] ([facility]), [address], [place] [address] using a secure [employer] K[employer]hart Video Visit through [employer]. Patient is being seen remotely via telehealth at home address in [place] and stated they are in a secure environment for this session. The patient's condition being diagnosed/treated is appropriate for telemedicine. The provider identified herself as well as her credentials. The patient, and/or patients guardian, consent to be seen remotely, and when consent is given they understand that the consent allows for patient identifiable information to be sent to a third party as needed. They may refuse to be seen remotely at any time. The electronic data is encrypted and password protected, and the patient and/or guardian has been advised of the potential risks to privacy not withstanding such measures.     You have chosen to receive care through a telehealth visit.  Do you consent to use a video/audio connection for your medical care today? Yes    PROGRESS NOTE  Data:  Hoa Lewis is a 31 y.o. female who presents today for follow up    Chief Complaint: Depression and Anxiety     History of Present Illness: Patient reports that overall motivation continues to remain problematic. Patient reports that she doesn't feel hopeless or worthless but finds her declined motivation and energy being a remaining symptoms regarding her depression. Patient reports that she would like to keep her house chores more manageable but finds herself overwhelmed when trying to complete these tasks during her off days. Patient reports that she feels exhausted on the weekends but tries to complete these household tasks but expressed feelings of guilt to the tasks left uncompleted.     Clinical Maneuvering/Intervention:    (Scales based on 0 - 10 with 10 being the worst)  Depression: 5 Anxiety:  5     DERRICK-7  Feeling nervous, anxious or on edge: (P) More than half the days  Not being able to stop or control worrying: (P) More than half the days  Worrying too much about different things: (P) Nearly every day  Trouble Relaxing: (P) More than half the days  Being so restless that it is hard to sit still: (P) More than half the days  Feeling afraid as if something awful might happen: (P) Several days  Becoming easily annoyed or irritable: (P) Nearly every day  DERRICK 7 Total Score: (P) 15  If you checked any problems, how difficult have these problems made it for you to do your work, take care of things at home, or get along with other people: (P) Very difficult   PHQ-9 Total Score: (P) 11     Assisted patient in processing above session content; acknowledged and normalized patient’s thoughts, feelings, and concerns.  Rationalized patient thought process regarding identifying remaining symptoms of depression such as declined motivation and decreased energy.  Discussed triggers associated with patient's depression.  Also discussed coping skills for patient to implement such as assigning one task for each day of the week in efforts to reduce feelings of being overwhelmed and hopefully be caught up where the weekend can be used as rest days.     Allowed patient to freely discuss issues without interruption or judgment. Provided safe, confidential environment to facilitate the development of positive therapeutic relationship and encourage open, honest communication. Assisted patient in identifying risk factors which would indicate the need for higher level of care including thoughts to harm self or others and/or self-harming behavior and encouraged patient to contact this office, call 911, or present to the nearest emergency room should any of these events occur. Discussed crisis intervention services and means to access. Patient adamantly and convincingly denies current suicidal or homicidal ideation or perceptual  disturbance.    Assessment:   Assessment   Patient appears to maintain relative stability as compared to their baseline.  However, patient continues to struggle with anxiety and depression which continues to cause impairment in important areas of functioning.  A result, they can be reasonably expected to continue to benefit from treatment and would likely be at increased risk for decompensation otherwise.    Mental Status Exam:   Hygiene:   good  Cooperation:  Cooperative  Eye Contact:  Good  Psychomotor Behavior:  Appropriate  Affect:  Appropriate  Mood: normal  Speech:  Normal  Thought Process:  Linear  Thought Content:  Normal  Suicidal:  None  Homicidal:  None  Hallucinations:  None  Delusion:  None  Memory:  Intact  Orientation:  Person, Place, Time and Situation  Reliability:  fair  Insight:  Fair  Judgement:  Fair  Impulse Control:  Fair  Physical/Medical Issues:  No        Patient's Support Network Includes:  parents    Functional Status: Mild impairment     Progress toward goal: Not at goal    Prognosis: Fair with Ongoing Treatment       Plan:    Patient will continue in individual outpatient therapy with focus on improved functioning and coping skills, maintaining stability, and avoiding decompensation and the need for higher level of care.    Patient will adhere to medication regimen as prescribed and report any side effects. Patient will contact this office, call 911 or present to the nearest emergency room should suicidal or homicidal ideations occur. Provide Cognitive Behavioral Therapy and Solution Focused Therapy to improve functioning, maintain stability, and avoid decompensation and the need for higher level of care.     Return in about 4 weeks, or earlier if symptoms worsen or fail to improve.           VISIT DIAGNOSIS:     ICD-10-CM ICD-9-CM   1. Moderate episode of recurrent major depressive disorder (HCC)  F33.1 296.32   2. Generalized anxiety disorder  F41.1 300.02          [facility]  [facility] No Show Policy:  We understand unexpected circumstances arise; however, anytime you miss your appointment we are unable to provide you appropriate care.  In addition, each appointment missed could have been used to provide care for others.  We ask that you call at least 24 hours in advance to cancel or reschedule an appointment.  We would like to take this opportunity to remind you of our policy stating patients who miss THREE or more appointments without cancelling or rescheduling 24 hours in advance of the appointment may be subject to cancellation of any further visits with our clinic and recommendation to seek in-person services/visits.    Please call [phone] to reschedule your appointment. If there are reasons that make it difficult for you to keep the appointments, please call and let us know how we can help.  Please understand that medication prescribing will not continue without seeing your provider.      [facility]'s No Show Policy reviewed with patient at today's visit. Patient verbalized understanding of this policy. Discussed with patient that in the event that there are three or more no show visits, it will be recommended that they pursue in-person services/visits as noncompliance with telehealth visits indicates that patient is not an appropriate candidate for telemedicine and would likely be more appropriate for in-person services/visits. Patient verbalizes understanding and is agreeable to this.        This document has been electronically signed by Hoa Bettencourt LCSW  October 25, 2021 13:55 EDT      Part of this note may be an electronic transcription/translation of spoken language to printed text using the Dragon Dictation System.

## 2021-12-10 ENCOUNTER — TELEMEDICINE (OUTPATIENT)
Dept: PSYCHIATRY | Facility: CLINIC | Age: 31
End: 2021-12-10

## 2021-12-10 DIAGNOSIS — F41.1 GENERALIZED ANXIETY DISORDER: Primary | ICD-10-CM

## 2021-12-10 PROCEDURE — 90832 PSYTX W PT 30 MINUTES: CPT | Performed by: COUNSELOR

## 2021-12-10 NOTE — PROGRESS NOTES
Date: December 13, 2021  Time In: 1120  Time Out: 1138  This provider is located at the [facility] (through [facility]), [address], [place] [address] using a secure [employer]hart Video Visit through [employer]. Patient is being seen remotely via telehealth at home address in [place] and stated they are in a secure environment for this session. The patient's condition being diagnosed/treated is appropriate for telemedicine. The provider identified herself as well as her credentials. The patient, and/or patients guardian, consent to be seen remotely, and when consent is given they understand that the consent allows for patient identifiable information to be sent to a third party as needed. They may refuse to be seen remotely at any time. The electronic data is encrypted and password protected, and the patient and/or guardian has been advised of the potential risks to privacy not withstanding such measures.     You have chosen to receive care through a telehealth visit.  Do you consent to use a video/audio connection for your medical care today? Yes    PROGRESS NOTE  Data:  Hoa Lewis is a 31 y.o. female who presents today for follow up    Chief Complaint: Anxiety     History of Present Illness: Patient reports increased anxiety regarding upcoming eye appointment. Patient explains that the last time she has had an eye exam she was a young child so is uncertain as to what to expect but discussed issues that she has been having with her vision and that she has set a goal to take care of herself and become more proactive. Patient discussed work related festivities that she is looking forward to as well.       Clinical Maneuvering/Intervention:    (Scales based on 0 - 10 with 10 being the worst)  Depression: 4-5 Anxiety: 3     DERRICK-7  Feeling nervous, anxious or on edge: (P) Several days  Not being able to stop or control worrying: (P) Several days  Worrying too much about  different things: (P) Several days  Trouble Relaxing: (P) Several days  Being so restless that it is hard to sit still: (P) More than half the days  Feeling afraid as if something awful might happen: (P) More than half the days  Becoming easily annoyed or irritable: (P) Several days  DERRICK 7 Total Score: (P) 9  If you checked any problems, how difficult have these problems made it for you to do your work, take care of things at home, or get along with other people: (P) Somewhat difficult   PHQ-9 Total Score: (P) 13     Assisted patient in processing above session content; acknowledged and normalized patient’s thoughts, feelings, and concerns.  Rationalized patient thought process regarding how uncertainty can increase one's worry due to not knowing what to expect.  Discussed triggers associated with patient's anxiety.  Also discussed coping skills for patient to implement such as having a new perspective of empowerment regarding her ability to care for herself and the steps she has took to do so.     Allowed patient to freely discuss issues without interruption or judgment. Provided safe, confidential environment to facilitate the development of positive therapeutic relationship and encourage open, honest communication. Assisted patient in identifying risk factors which would indicate the need for higher level of care including thoughts to harm self or others and/or self-harming behavior and encouraged patient to contact this office, call 911, or present to the nearest emergency room should any of these events occur. Discussed crisis intervention services and means to access. Patient adamantly and convincingly denies current suicidal or homicidal ideation or perceptual disturbance.    Assessment:   Assessment   Patient appears to maintain relative stability as compared to their baseline.  However, patient continues to struggle with anxiety which continues to cause impairment in important areas of functioning.  A  result, they can be reasonably expected to continue to benefit from treatment and would likely be at increased risk for decompensation otherwise.    Mental Status Exam:   Hygiene:   good  Cooperation:  Cooperative  Eye Contact:  Good  Psychomotor Behavior:  Appropriate  Affect:  Appropriate  Mood: normal  Speech:  Normal  Thought Process:  Linear  Thought Content:  Normal  Suicidal:  None  Homicidal:  None  Hallucinations:  None  Delusion:  None  Memory:  Intact  Orientation:  Person, Place, Time and Situation  Reliability:  fair  Insight:  Fair  Judgement:  Fair  Impulse Control:  Fair  Physical/Medical Issues:  No        Patient's Support Network Includes:  extended family    Functional Status: Mild impairment     Progress toward goal: Not at goal    Prognosis: Fair with Ongoing Treatment          Plan:    Patient will continue in individual outpatient therapy with focus on improved functioning and coping skills, maintaining stability, and avoiding decompensation and the need for higher level of care.    Patient will adhere to medication regimen as prescribed and report any side effects. Patient will contact this office, call 911 or present to the nearest emergency room should suicidal or homicidal ideations occur. Provide Cognitive Behavioral Therapy and Solution Focused Therapy to improve functioning, maintain stability, and avoid decompensation and the need for higher level of care.     Return in about 4 weeks, or earlier if symptoms worsen or fail to improve.           VISIT DIAGNOSIS:     ICD-10-CM ICD-9-CM   1. Generalized anxiety disorder  F41.1 300.02          [facility] No Show Policy:  We understand unexpected circumstances arise; however, anytime you miss your appointment we are unable to provide you appropriate care.  In addition, each appointment missed could have been used to provide care for others.  We ask that you call at least 24 hours in advance to cancel or reschedule an  appointment.  We would like to take this opportunity to remind you of our policy stating patients who miss THREE or more appointments without cancelling or rescheduling 24 hours in advance of the appointment may be subject to cancellation of any further visits with our clinic and recommendation to seek in-person services/visits.    Please call [phone] to reschedule your appointment. If there are reasons that make it difficult for you to keep the appointments, please call and let us know how we can help.  Please understand that medication prescribing will not continue without seeing your provider.      [facility]'s No Show Policy reviewed with patient at today's visit. Patient verbalized understanding of this policy. Discussed with patient that in the event that there are three or more no show visits, it will be recommended that they pursue in-person services/visits as noncompliance with telehealth visits indicates that patient is not an appropriate candidate for telemedicine and would likely be more appropriate for in-person services/visits. Patient verbalizes understanding and is agreeable to this.        This document has been electronically signed by Hoa Bettencourt LCSW  December 13, 2021 09:12 EST      Part of this note may be an electronic transcription/translation of spoken language to printed text using the Dragon Dictation System.

## 2022-01-07 ENCOUNTER — TELEMEDICINE (OUTPATIENT)
Dept: PSYCHIATRY | Facility: CLINIC | Age: 32
End: 2022-01-07

## 2022-01-07 DIAGNOSIS — F41.1 GENERALIZED ANXIETY DISORDER: Primary | ICD-10-CM

## 2022-01-07 PROCEDURE — 90832 PSYTX W PT 30 MINUTES: CPT | Performed by: COUNSELOR

## 2022-01-07 NOTE — PROGRESS NOTES
"Date: January 7, 2022  Time In: 0830  Time Out: 0851  This provider is located at the [facility]), [address], [place] [address] using a secure ATG Accesshart Video Visit through [employer]. Patient is being seen remotely via telehealth at home address in [place] and stated they are in a secure environment for this session. The patient's condition being diagnosed/treated is appropriate for telemedicine. The provider identified herself as well as her credentials. The patient, and/or patients guardian, consent to be seen remotely, and when consent is given they understand that the consent allows for patient identifiable information to be sent to a third party as needed. They may refuse to be seen remotely at any time. The electronic data is encrypted and password protected, and the patient and/or guardian has been advised of the potential risks to privacy not withstanding such measures.     You have chosen to receive care through a telehealth visit.  Do you consent to use a video/audio connection for your medical care today? Yes    PROGRESS NOTE  Data:  Hoa Lewis is a 31 y.o. female who presents today for follow up    Chief Complaint: Anxiety     History of Present Illness: Patient reports recent eye doctor appointment and how the appointment itself was tolerable it was her mind that increased anxiousness due to playing multiple what if scenarios as to what to expect during the appointment. Patient reports that her anxiety and depression has been manageable denying any worsening symptoms. Patient discussed concerns of adhd or autism. Patient reports that she has difficulty paying attention and keeping focus. Patient reports that she struggles with \"maladaptive daydreaming\", restlessness (shaking/tapping foot, picking skin, or fidgeting with fingers), difficulty starting tasks, hyperfocus, and time management decisions. Patient discussed interpersonal " relationship issues such as not feeling connected to others and struggling with identifying social que. Patient reports that she remains quite and to herself due to not knowing how to interact with others causing her to assume that others believe she is odd or unusual. Patient agreeable for medication management referral.       Clinical Maneuvering/Intervention:    (Scales based on 0 - 10 with 10 being the worst)  Depression: 4 Anxiety: 4-5     Assisted patient in processing above session content; acknowledged and normalized patient’s thoughts, feelings, and concerns.  Rationalized patient thought process regarding difficulty with connecting with others.  Discussed triggers associated with patient's difficulty with focus.  Also discussed coping skills for patient to implement such as identifying areas of satisfaction on lifestyle wheel to determine if attention/focus has became an issue regarding certain areas in her life.    Allowed patient to freely discuss issues without interruption or judgment. Provided safe, confidential environment to facilitate the development of positive therapeutic relationship and encourage open, honest communication. Assisted patient in identifying risk factors which would indicate the need for higher level of care including thoughts to harm self or others and/or self-harming behavior and encouraged patient to contact this office, call 911, or present to the nearest emergency room should any of these events occur. Discussed crisis intervention services and means to access. Patient adamantly and convincingly denies current suicidal or homicidal ideation or perceptual disturbance.    Assessment:   Assessment   Patient appears to maintain relative stability as compared to their baseline.  However, patient continues to struggle with anxiety which continues to cause impairment in important areas of functioning.  A result, they can be reasonably expected to continue to benefit from treatment  and would likely be at increased risk for decompensation otherwise.    Mental Status Exam:   Hygiene:   good  Cooperation:  Cooperative  Eye Contact:  Good  Psychomotor Behavior:  Appropriate  Affect:  Appropriate  Mood: normal  Speech:  Normal  Thought Process:  Linear  Thought Content:  Normal  Suicidal:  None  Homicidal:  None  Hallucinations:  None  Delusion:  None  Memory:  Intact  Orientation:  Person, Place, Time and Situation  Reliability:  fair  Insight:  Fair  Judgement:  Fair  Impulse Control:  Fair  Physical/Medical Issues:  No        Patient's Support Network Includes:  mother    Functional Status: Mild impairment     Progress toward goal: Not at goal    Prognosis: Fair with Ongoing Treatment          Plan:    Patient will continue in individual outpatient therapy with focus on improved functioning and coping skills, maintaining stability, and avoiding decompensation and the need for higher level of care.    Patient will adhere to medication regimen as prescribed and report any side effects. Patient will contact this office, call 911 or present to the nearest emergency room should suicidal or homicidal ideations occur. Provide Cognitive Behavioral Therapy and Solution Focused Therapy to improve functioning, maintain stability, and avoid decompensation and the need for higher level of care.     Return in about 2 weeks, or earlier if symptoms worsen or fail to improve.           VISIT DIAGNOSIS:     ICD-10-CM ICD-9-CM   1. Generalized anxiety disorder  F41.1 300.02          [facility] No Show Policy:  We understand unexpected circumstances arise; however, anytime you miss your appointment we are unable to provide you appropriate care.  In addition, each appointment missed could have been used to provide care for others.  We ask that you call at least 24 hours in advance to cancel or reschedule an appointment.  We would like to take this opportunity to remind you of our policy  stating patients who miss THREE or more appointments without cancelling or rescheduling 24 hours in advance of the appointment may be subject to cancellation of any further visits with our clinic and recommendation to seek in-person services/visits.    Please call [phone] to reschedule your appointment. If there are reasons that make it difficult for you to keep the appointments, please call and let us know how we can help.  Please understand that medication prescribing will not continue without seeing your provider.      [facility]'s No Show Policy reviewed with patient at today's visit. Patient verbalized understanding of this policy. Discussed with patient that in the event that there are three or more no show visits, it will be recommended that they pursue in-person services/visits as noncompliance with telehealth visits indicates that patient is not an appropriate candidate for telemedicine and would likely be more appropriate for in-person services/visits. Patient verbalizes understanding and is agreeable to this.        This document has been electronically signed by Hoa Bettencourt LCSW  January 7, 2022 09:00 EST      Part of this note may be an electronic transcription/translation of spoken language to printed text using the Dragon Dictation System.

## 2022-01-11 ENCOUNTER — TELEPHONE (OUTPATIENT)
Dept: PSYCHIATRY | Facility: CLINIC | Age: 32
End: 2022-01-11

## 2022-01-11 NOTE — TELEPHONE ENCOUNTER
PER DEBI MORALES , SHE IS WANTING PATIENT TO BE SCHEDULED WITH [facility] FOR MED MANAGEMENT , LEFT  FOR PATIENT TO CALL AND SCHEDULE APPT

## 2022-01-18 DIAGNOSIS — N92.0 MENORRHAGIA WITH REGULAR CYCLE: ICD-10-CM

## 2022-01-19 RX ORDER — NORGESTIMATE AND ETHINYL ESTRADIOL
KIT
Qty: 28 TABLET | Refills: 2 | Status: SHIPPED | OUTPATIENT
Start: 2022-01-19 | End: 2022-04-25

## 2022-01-28 ENCOUNTER — TELEMEDICINE (OUTPATIENT)
Dept: PSYCHIATRY | Facility: CLINIC | Age: 32
End: 2022-01-28

## 2022-01-28 DIAGNOSIS — F41.1 GENERALIZED ANXIETY DISORDER: Primary | ICD-10-CM

## 2022-01-28 PROCEDURE — 90832 PSYTX W PT 30 MINUTES: CPT | Performed by: COUNSELOR

## 2022-01-28 NOTE — PROGRESS NOTES
Date: January 31, 2022  Time In: 1130  Time Out: 1149  This provider is located at the [facility] ([facility]), [address], [place] [address] using a secure Mohivehart Video Visit through [employer]. Patient is being seen remotely via telehealth at home address in [place] and stated they are in a secure environment for this session. The patient's condition being diagnosed/treated is appropriate for telemedicine. The provider identified herself as well as her credentials. The patient, and/or patients guardian, consent to be seen remotely, and when consent is given they understand that the consent allows for patient identifiable information to be sent to a third party as needed. They may refuse to be seen remotely at any time. The electronic data is encrypted and password protected, and the patient and/or guardian has been advised of the potential risks to privacy not withstanding such measures.     You have chosen to receive care through a telehealth visit.  Do you consent to use a video/audio connection for your medical care today? Yes    PROGRESS NOTE  Data:  Hoa Lewis is a 31 y.o. female who presents today for follow up    Chief Complaint: anxiety     History of Present Illness: Patient discussed struggling with focus and attention more so recently. Patient discussed how this has became problematic at work. Patient reports that she has tried different methods in efforts to help with memory and attention but has been unsuccessful thus far. Patient reports that her mind seems to run faster at night causing difficulty to fall asleep.     Clinical Maneuvering/Intervention:    (Scales based on 0 - 10 with 10 being the worst)  Depression: 2 Anxiety: 2     Assisted patient in processing above session content; acknowledged and normalized patient’s thoughts, feelings, and concerns.  Rationalized patient thought process regarding desires to address mental health  and improve attention and focus discussed different practices to help with focus and attention such certain instrumental music, mints, and other sensory like methods. Encouraged patient to write to do list at night in efforts to reduce racing thoughts.     Allowed patient to freely discuss issues without interruption or judgment. Provided safe, confidential environment to facilitate the development of positive therapeutic relationship and encourage open, honest communication. Assisted patient in identifying risk factors which would indicate the need for higher level of care including thoughts to harm self or others and/or self-harming behavior and encouraged patient to contact this office, call 911, or present to the nearest emergency room should any of these events occur. Discussed crisis intervention services and means to access. Patient adamantly and convincingly denies current suicidal or homicidal ideation or perceptual disturbance.    Assessment:   Assessment   Patient appears to maintain relative stability as compared to their baseline.  However, patient continues to struggle with anxiety which continues to cause impairment in important areas of functioning.  A result, they can be reasonably expected to continue to benefit from treatment and would likely be at increased risk for decompensation otherwise.    Mental Status Exam:   Hygiene:   good  Cooperation:  Cooperative  Eye Contact:  Good  Psychomotor Behavior:  Appropriate  Affect:  Appropriate  Mood: normal  Speech:  Normal  Thought Process:  Linear  Thought Content:  Normal  Suicidal:  None  Homicidal:  None  Hallucinations:  None  Delusion:  None  Memory:  Intact  Orientation:  Person, Place, Time and Situation  Reliability:  fair  Insight:  Fair  Judgement:  Fair  Impulse Control:  Fair  Physical/Medical Issues:  No        Patient's Support Network Includes:  extended family    Functional Status: Mild impairment     Progress toward goal: Not at  goal    Prognosis: Fair with Ongoing Treatment     Plan:    Patient will continue in individual outpatient therapy with focus on improved functioning and coping skills, maintaining stability, and avoiding decompensation and the need for higher level of care.    Patient will adhere to medication regimen as prescribed and report any side effects. Patient will contact this office, call 911 or present to the nearest emergency room should suicidal or homicidal ideations occur. Provide Cognitive Behavioral Therapy and Solution Focused Therapy to improve functioning, maintain stability, and avoid decompensation and the need for higher level of care.     Return in about 2 weeks, or earlier if symptoms worsen or fail to improve.           VISIT DIAGNOSIS:     ICD-10-CM ICD-9-CM   1. Generalized anxiety disorder  F41.1 300.02          [facility] No Show Policy:  We understand unexpected circumstances arise; however, anytime you miss your appointment we are unable to provide you appropriate care.  In addition, each appointment missed could have been used to provide care for others.  We ask that you call at least 24 hours in advance to cancel or reschedule an appointment.  We would like to take this opportunity to remind you of our policy stating patients who miss THREE or more appointments without cancelling or rescheduling 24 hours in advance of the appointment may be subject to cancellation of any further visits with our clinic and recommendation to seek in-person services/visits.    Please call [phone] to reschedule your appointment. If there are reasons that make it difficult for you to keep the appointments, please call and let us know how we can help.  Please understand that medication prescribing will not continue without seeing your provider.      [facility]'s No Show Policy reviewed with patient at today's visit. Patient verbalized understanding of this policy.  Discussed with patient that in the event that there are three or more no show visits, it will be recommended that they pursue in-person services/visits as noncompliance with telehealth visits indicates that patient is not an appropriate candidate for telemedicine and would likely be more appropriate for in-person services/visits. Patient verbalizes understanding and is agreeable to this.        This document has been electronically signed by Hoa Bettencourt LCSW  January 31, 2022 14:28 EST      Part of this note may be an electronic transcription/translation of spoken language to printed text using the Dragon Dictation System.

## 2022-02-11 ENCOUNTER — TELEMEDICINE (OUTPATIENT)
Dept: PSYCHIATRY | Facility: CLINIC | Age: 32
End: 2022-02-11

## 2022-02-11 DIAGNOSIS — F41.1 GENERALIZED ANXIETY DISORDER: Primary | ICD-10-CM

## 2022-02-11 PROCEDURE — 90832 PSYTX W PT 30 MINUTES: CPT | Performed by: COUNSELOR
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
## 2022-03-02 DIAGNOSIS — F41.9 ANXIETY: ICD-10-CM

## 2022-03-02 DIAGNOSIS — F33.1 MODERATE EPISODE OF RECURRENT MAJOR DEPRESSIVE DISORDER: ICD-10-CM

## 2022-03-04 RX ORDER — BUPROPION HYDROCHLORIDE 150 MG/1
150 TABLET ORAL EVERY MORNING
Qty: 90 TABLET | Refills: 1 | Status: SHIPPED | OUTPATIENT
Start: 2022-03-04 | End: 2022-05-18 | Stop reason: SDUPTHER

## 2022-03-17 ENCOUNTER — TELEMEDICINE (OUTPATIENT)
Dept: PSYCHIATRY | Facility: CLINIC | Age: 32
End: 2022-03-17

## 2022-03-17 DIAGNOSIS — F33.1 MODERATE EPISODE OF RECURRENT MAJOR DEPRESSIVE DISORDER: ICD-10-CM

## 2022-03-17 DIAGNOSIS — F41.1 GENERALIZED ANXIETY DISORDER: Primary | ICD-10-CM

## 2022-03-17 PROCEDURE — 90792 PSYCH DIAG EVAL W/MED SRVCS: CPT | Performed by: NURSE PRACTITIONER

## 2022-03-17 RX ORDER — SERTRALINE HYDROCHLORIDE 25 MG/1
25 TABLET, FILM COATED ORAL DAILY
Qty: 30 TABLET | Refills: 1 | Status: SHIPPED | OUTPATIENT
Start: 2022-03-17 | End: 2022-04-20 | Stop reason: SDUPTHER
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
## 2022-04-20 ENCOUNTER — TELEMEDICINE (OUTPATIENT)
Dept: PSYCHIATRY | Facility: CLINIC | Age: 32
End: 2022-04-20

## 2022-04-20 DIAGNOSIS — F33.1 MODERATE EPISODE OF RECURRENT MAJOR DEPRESSIVE DISORDER: ICD-10-CM

## 2022-04-20 DIAGNOSIS — F41.1 GENERALIZED ANXIETY DISORDER: ICD-10-CM

## 2022-04-20 PROCEDURE — 99214 OFFICE O/P EST MOD 30 MIN: CPT | Performed by: NURSE PRACTITIONER

## 2022-04-20 NOTE — PROGRESS NOTES
[facility] provider [facility] [address] [facility] [address] [place] [address].The Patient is seen remotely at home,  [place] [address] [facility].  Patient is being seen via telehealth and confirm that they are in a secure environment for this session. The patient's condition being diagnosed/treated is appropriate for telemedicine. The provider identified himself/herself: herself as well as her credentials.   The patient gave consent to be seen remotely, and when consent is given they understand that the consent allows for patient identifiable information to be sent to a third party as needed.   They may refuse to be seen remotely at any time. The electronic data is encrypted and password protected, and the patient has been advised of the potential risks to privacy not withstanding such measures.    You have chosen to receive care through a telehealth visit.  Do you consent to use a video/audio connection for your medical care today? Yes. Patient verified Name, , and address.       Chief Complaint  Anxiety and depression     Subjective    Hoa Abril Lewis presents to [facility].     History of Present Illness   Patient presents today reporting that she feels the medication is starting to be effective.  She notes that she is not irritated as much.  She still states however she has nervous thoughts and gets anxious at times but she feels it has improved.  Patient reports that depression has been better as she has not felt hopeless or helpless but when she does get nervous she feels down at times.  Patient states that her anxiety increases based on situations but she is able to calm down.  She rates her depression at 3 anxiety a 4-5 on a scale of 0-10 with 10 being the worst.  She reports that she is sleeping and eating well.  Patient states that she is still having issues with motivation.  She states  that when she gets started it is not an issue but it still lack of motivation and times staying on the task that needs to be done.  However patient notes that that has improved some as she feels she is slowly getting back on track.  Denies any side effects to the medications.  Denies any SI/HI/AH/VH.        Objective   Vital Signs:   There were no vitals taken for this visit.  Due to the remote nature of this encounter (virtual encounter), vitals were unable to be obtained.  Height stated at 67.9 inches.  Weight stated at 295 pounds.        PHQ-9 Score:   PHQ-9 Total Score:       Patient screened positive for depression based on a PHQ-9 score of  on . Follow-up recommendations include: see notes and medication list.    PHQ-9 Depression Screening  Little interest or pleasure in doing things?     Feeling down, depressed, or hopeless?     Trouble falling or staying asleep, or sleeping too much?     Feeling tired or having little energy?     Poor appetite or overeating?     Feeling bad about yourself - or that you are a failure or have let yourself or your family down?     Trouble concentrating on things, such as reading the newspaper or watching television?     Moving or speaking so slowly that other people could have noticed? Or the opposite - being so fidgety or restless that you have been moving around a lot more than usual?     Thoughts that you would be better off dead, or of hurting yourself in some way?     PHQ-9 Total Score     If you checked off any problems, how difficult have these problems made it for you to do your work, take care of things at home, or get along with other people?                  PROMIS scale screening tool that patient filled out virtually reviewed by this APRN at today's encounter.      Mental Status Exam:   Hygiene:   good  Cooperation:  Cooperative  Eye Contact:  Fair  Psychomotor Behavior:  Restless  Affect:  Appropriate  Mood: anxious  Speech:  Normal  Thought Process:  Goal  directed  Thought Content:  Normal  Suicidal:  None  Homicidal:  None  Hallucinations:  None  Delusion:  None  Memory:  Intact  Orientation:  Person, Place, Time and Situation  Reliability:  good  Insight:  Good and Fair  Judgement:  Good and Fair  Impulse Control:  Good and Fair  Physical/Medical Issues:  No      Current Medications:   Current Outpatient Medications   Medication Sig Dispense Refill   • sertraline (Zoloft) 50 MG tablet Take 1 tablet by mouth Daily for 60 days. 30 tablet 1   • buPROPion XL (WELLBUTRIN XL) 150 MG 24 hr tablet TAKE 1 TABLET BY MOUTH EVERY MORNING 90 tablet 1   • Cholecalciferol (Vitamin D3) 1.25 MG (13053 UT) capsule TAKE 1 CAPSULE BY MOUTH EVERY 7 DAYS FOR 8 DOSES 8 capsule 0   • Tri-Lo-Sprintec 0.18/0.215/0.25 MG-25 MCG per tablet TAKE 1 TABLET BY MOUTH DAILY 28 tablet 2   • triamcinolone (KENALOG) 0.5 % ointment Apply  topically to the appropriate area as directed 2 (Two) Times a Day. 30 g 5   • vitamin B-12 (CYANOCOBALAMIN) 1000 MCG tablet Take 1 tablet by mouth Daily. 90 tablet 1     No current facility-administered medications for this visit.       Physical Exam  Constitutional:       Appearance: Normal appearance.   Neurological:      Mental Status: She is alert.   Psychiatric:         Attention and Perception: Attention and perception normal.         Mood and Affect: Affect normal. Mood is anxious. Mood is not depressed.         Speech: Speech normal.         Behavior: Behavior is cooperative.         Thought Content: Thought content normal.         Cognition and Memory: Cognition and memory normal.        Result Review :     The following data was reviewed by: SHARATH Cornell on 03/17/2022:  Common labs    Common Labsle 8/27/21 8/27/21 8/27/21 9/7/21    0948 0948 0948    Glucose  86     BUN  9     Creatinine  0.63     eGFR Non African Am  112     Sodium  136     Potassium  4.3     Chloride  104     Calcium  8.8     Albumin  3.80     Total Bilirubin  0.3     Alkaline  Phosphatase  70     AST (SGOT)  9     ALT (SGPT)  13     WBC   8.50 7.15   Hemoglobin   10.8 (A) 11.6 (A)   Hematocrit   34.6 35.9   Platelets   332 359   Total Cholesterol 222 (A)      Triglycerides 253 (A)      HDL Cholesterol 68 (A)      LDL Cholesterol  111 (A)      (A) Abnormal value            CMP    CMP 8/27/21   Glucose 86   BUN 9   Creatinine 0.63   eGFR Non African Am 112   Sodium 136   Potassium 4.3   Chloride 104   Calcium 8.8   Albumin 3.80   Total Bilirubin 0.3   Alkaline Phosphatase 70   AST (SGOT) 9   ALT (SGPT) 13           CBC    CBC 8/27/21 9/7/21   WBC 8.50 7.15   RBC 4.09 4.41   Hemoglobin 10.8 (A) 11.6 (A)   Hematocrit 34.6 35.9   MCV 84.6 81.4   MCH 26.4 (A) 26.3 (A)   MCHC 31.2 (A) 32.3   RDW 13.9 13.5   Platelets 332 359   (A) Abnormal value            CBC w/diff    CBC w/Diff 8/27/21 9/7/21   WBC 8.50 7.15   RBC 4.09 4.41   Hemoglobin 10.8 (A) 11.6 (A)   Hematocrit 34.6 35.9   MCV 84.6 81.4   MCH 26.4 (A) 26.3 (A)   MCHC 31.2 (A) 32.3   RDW 13.9 13.5   Platelets 332 359   Neutrophil Rel % 58.8 54.7   Immature Granulocyte Rel % 0.7 (A) 0.6 (A)   Lymphocyte Rel % 32.1 35.2   Monocyte Rel % 5.8 6.7   Eosinophil Rel % 1.8 1.8   Basophil Rel % 0.8 1.0   (A) Abnormal value            Lipid Panel    Lipid Panel 8/27/21   Total Cholesterol 222 (A)   Triglycerides 253 (A)   HDL Cholesterol 68 (A)   VLDL Cholesterol 43 (A)   LDL Cholesterol  111 (A)   LDL/HDL Ratio 1.52   (A) Abnormal value            TSH    TSH 8/27/21   TSH 4.520 (A)   (A) Abnormal value            Electrolytes    Electrolytes 8/27/21   Sodium 136   Potassium 4.3   Chloride 104   Calcium 8.8           Renal Profile    Renal Profile 8/27/21   BUN 9   Creatinine 0.63   eGFR Non African Am 112                   Data reviewed: PCP notes        Assessment and Plan    Problem List Items Addressed This Visit        Mental Health    Moderate episode of recurrent major depressive disorder (HCC)    Relevant Medications    sertraline (Zoloft)  50 MG tablet      Other Visit Diagnoses     Generalized anxiety disorder        Relevant Medications    sertraline (Zoloft) 50 MG tablet            TREATMENT PLAN/GOALS: Continue supportive psychotherapy efforts and medications as indicated. Treatment and medication options discussed during today's visit. Patient ackowledged and verbally consented to continue with current treatment plan and was educated on the importance of compliance with treatment and follow-up appointments.    MEDICATION ISSUES:  We discussed risks, benefits, and side effects of the above medications and the patient was agreeable with the plan. Patient was educated on the importance of compliance with treatment and follow-up appointments.  Patient is agreeable to call the office with any worsening of symptoms or onset of side effects. Patient is agreeable to call 911 or go to the nearest ER should he/she begin having SI/HI. Patient was strongly encouraged to continue birth control.  Patient was counseled regarding need not to become pregnant prior to discussion and possible titration and discontinuation of medications.  An explanation was provided to the patient regarding the risk of fetal harm with psychotrophic medications.  Patient was provided education regarding both risk of continuing and discontinuing medications during pregnancy.  Patient verbalized understanding.        -Continue Wellbutrin 150 mg XL daily as adjunct for depression.  -Increase sertraline to 50 mg daily for depressive and anxiety symptoms.  Highly encouraged patient she had any side effects or worsening symptoms to discontinue and contact the clinic she verbalized understanding.  -Continue psychotherapy with Hoa Bettencourt.    Counseled patient regarding multimodal approach with healthy nutrition, healthy sleep, regular physical activity, social activities, counseling, and medications.      Coping skills reviewed and encouraged positive framing of thoughts     Assisted  patient in processing above session content; acknowledged and normalized patient’s thoughts, feelings, and concerns.  Applied  positive coping skills and behavior management in session.  Allowed patient to freely discuss issues without interruption or judgment. Provided safe, confidential environment to facilitate the development of positive therapeutic relationship and encourage open, honest communication. Assisted patient in identifying risk factors which would indicate the need for higher level of care including thoughts to harm self or others and/or self-harming behavior and encouraged patient to contact this office, call 911, or present to the nearest emergency room should any of these events occur. Discussed crisis intervention services and means to access.     MEDS ORDERED DURING VISIT:  New Medications Ordered This Visit   Medications   • sertraline (Zoloft) 50 MG tablet     Sig: Take 1 tablet by mouth Daily for 60 days.     Dispense:  30 tablet     Refill:  1           Follow Up   Return in about 4 weeks (around 5/18/2022), or if symptoms worsen or fail to improve, for Recheck.    Patient was given instructions and counseling regarding her condition or for health maintenance advice. Please see specific information pulled into the AVS if appropriate.     Some of the data in this electronic note has been brought forward from a previous encounter, any necessary changes have been made, it has been reviewed by this APRN, and it is accurate.      This document has been electronically signed by SHARATH Cornell  April 20, 2022 08:53 EDT    Part of this note may be an electronic transcription/translation of spoken language to printed text using the Dragon Dictation System.

## 2022-04-22 ENCOUNTER — TELEMEDICINE (OUTPATIENT)
Dept: PSYCHIATRY | Facility: CLINIC | Age: 32
End: 2022-04-22

## 2022-04-22 DIAGNOSIS — F41.1 GENERALIZED ANXIETY DISORDER: Primary | ICD-10-CM

## 2022-04-22 PROCEDURE — 90832 PSYTX W PT 30 MINUTES: CPT | Performed by: COUNSELOR

## 2022-04-22 NOTE — PROGRESS NOTES
Date: April 22, 2022  Time In: 1131  Time Out: 1150  This provider is located at the [facility] (through [facility]), [address], [place] [address] using a secure [employer] Video Visit through [employer]. Patient is being seen remotely via telehealth at home address in [place] and stated they are in a secure environment for this session. The patient's condition being diagnosed/treated is appropriate for telemedicine. The provider identified herself as well as her credentials. The patient, and/or patients guardian, consent to be seen remotely, and when consent is given they understand that the consent allows for patient identifiable information to be sent to a third party as needed. They may refuse to be seen remotely at any time. The electronic data is encrypted and password protected, and the patient and/or guardian has been advised of the potential risks to privacy not withstanding such measures.     You have chosen to receive care through a telehealth visit.  Do you consent to use a video/audio connection for your medical care today? Yes    PROGRESS NOTE  Data:  Hoa Lewis is a 31 y.o. female who presents today for follow up    Chief Complaint: Anxiety     History of Present Illness: Anxiety seems to be more circumstantial despite increasing during random moments does appear to be more manageable. 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 Processing dad's current depressive state. Discussed impact of dad and identified emotions associated to that. Discussed family members; dad, mother,sister, and brother and being supportive with one another during this time.     Clinical Maneuvering/Intervention:    (Scales based on 0 - 10 with 10 being the worst)  Depression: 5 Anxiety: 5     Assisted patient in processing above session content; acknowledged and normalized patient’s thoughts, feelings, and concerns.  Rationalized patient thought  process regarding emotions associated with dad's current mental state.  Discussed triggers associated with patient's anxiety.  Also discussed coping skills for patient to implement such as  self. Discussed time and making more of an effort to spend time with loved ones. Processed emotions such as guilt, fear, and worry.     Allowed patient to freely discuss issues without interruption or judgment. Provided safe, confidential environment to facilitate the development of positive therapeutic relationship and encourage open, honest communication. Assisted patient in identifying risk factors which would indicate the need for higher level of care including thoughts to harm self or others and/or self-harming behavior and encouraged patient to contact this office, call 911, or present to the nearest emergency room should any of these events occur. Discussed crisis intervention services and means to access. Patient adamantly and convincingly denies current suicidal or homicidal ideation or perceptual disturbance.    Assessment:   Assessment   Patient appears to maintain relative stability as compared to their baseline.  However, patient continues to struggle with anxiety which continues to cause impairment in important areas of functioning.  A result, they can be reasonably expected to continue to benefit from treatment and would likely be at increased risk for decompensation otherwise.    Mental Status Exam:   Hygiene:   good  Cooperation:  Cooperative  Eye Contact:  Good  Psychomotor Behavior:  Appropriate  Affect:  Appropriate  Mood: normal  Speech:  Normal  Thought Process:  Linear  Thought Content:  Normal  Suicidal:  None  Homicidal:  None  Hallucinations:  None  Delusion:  None  Memory:  Intact  Orientation:  Person, Place, Time and Situation  Reliability:  fair  Insight:  Fair  Judgement:  Fair  Impulse Control:  Fair  Physical/Medical Issues:  No        Patient's Support Network Includes:   mother    Functional Status: Mild impairment     Progress toward goal: Not at goal    Prognosis: Fair with Ongoing Treatment     Plan:    Patient will continue in individual outpatient therapy with focus on improved functioning and coping skills, maintaining stability, and avoiding decompensation and the need for higher level of care.    Patient will adhere to medication regimen as prescribed and report any side effects. Patient will contact this office, call 911 or present to the nearest emergency room should suicidal or homicidal ideations occur. Provide Cognitive Behavioral Therapy and Solution Focused Therapy to improve functioning, maintain stability, and avoid decompensation and the need for higher level of care.     Return in about 2 weeks, or earlier if symptoms worsen or fail to improve.      VISIT DIAGNOSIS:     ICD-10-CM ICD-9-CM   1. Generalized anxiety disorder  F41.1 300.02      [facility] No Show Policy:  We understand unexpected circumstances arise; however, anytime you miss your appointment we are unable to provide you appropriate care.  In addition, each appointment missed could have been used to provide care for others.  We ask that you call at least 24 hours in advance to cancel or reschedule an appointment.  We would like to take this opportunity to remind you of our policy stating patients who miss THREE or more appointments without cancelling or rescheduling 24 hours in advance of the appointment may be subject to cancellation of any further visits with our clinic and recommendation to seek in-person services/visits.    Please call [phone] to reschedule your appointment. If there are reasons that make it difficult for you to keep the appointments, please call and let us know how we can help.  Please understand that medication prescribing will not continue without seeing your provider.      [facility]'s No Show Policy reviewed with patient at  today's visit. Patient verbalized understanding of this policy. Discussed with patient that in the event that there are three or more no show visits, it will be recommended that they pursue in-person services/visits as noncompliance with telehealth visits indicates that patient is not an appropriate candidate for telemedicine and would likely be more appropriate for in-person services/visits. Patient verbalizes understanding and is agreeable to this.        This document has been electronically signed by Hoa Bettencourt LCSW  April 22, 2022 11:50 EDT      Part of this note may be an electronic transcription/translation of spoken language to printed text using the Dragon Dictation System.

## 2022-04-24 DIAGNOSIS — N92.0 MENORRHAGIA WITH REGULAR CYCLE: ICD-10-CM

## 2022-04-25 RX ORDER — NORGESTIMATE AND ETHINYL ESTRADIOL
KIT
Qty: 28 TABLET | Refills: 2 | Status: SHIPPED | OUTPATIENT
Start: 2022-04-25 | End: 2022-07-08

## 2022-05-03 ENCOUNTER — TELEPHONE (OUTPATIENT)
Dept: INTERNAL MEDICINE | Facility: CLINIC | Age: 32
End: 2022-05-03

## 2022-05-03 NOTE — TELEPHONE ENCOUNTER
05/03/2022    MOTHER OF PATIENT CALLED TO INQUIRE ABOUT ALLERGY TESTING SERVICES PROVIDED BY PCP. PCP DOES NOT PROVIDE ALLERGY TESTING SERVICES.    INFORMED CLIENT THAT REFERRAL IS NOT NECESSARY TO FIND AN ALLERGIST, AND PROVIDED CLIENT WITH MORE INFORMATION ON HOW TO FIND ONE.

## 2022-05-06 ENCOUNTER — TELEMEDICINE (OUTPATIENT)
Dept: PSYCHIATRY | Facility: CLINIC | Age: 32
End: 2022-05-06

## 2022-05-06 DIAGNOSIS — F41.1 GENERALIZED ANXIETY DISORDER: Primary | ICD-10-CM

## 2022-05-06 DIAGNOSIS — F33.1 MODERATE EPISODE OF RECURRENT MAJOR DEPRESSIVE DISORDER: ICD-10-CM

## 2022-05-06 PROCEDURE — 90832 PSYTX W PT 30 MINUTES: CPT | Performed by: COUNSELOR

## 2022-05-06 NOTE — TREATMENT PLAN
Multi-Disciplinary Problems (from Behavioral Health Treatment Plan)    Active Problems     Problem: Anxiety  Start Date: 08/20/21    Problem Details: The patient self-scales this problem as a 8 with 10 being the worst.        Goal Priority Start Date Expected End Date End Date    Patient will develop and implement behavioral and cognitive strategies to reduce anxiety and irrational fears. -- 08/20/21 -- --    Goal Details: Progress toward goal:  The patient self-scales their progress related to this goal as a 4 with 10 being the worst.        Goal Intervention Frequency Start Date End Date    Help patient explore past emotional issues in relation to present anxiety. Q Month 08/20/21 --    Intervention Details: Duration of treatment until until discharged.        Goal Intervention Frequency Start Date End Date    Help patient develop an awareness of their cognitive and physical responses to anxiety. Q Month 08/20/21 --    Intervention Details: Duration of treatment until until discharged.              Problem: Depression  Start Date: 08/20/21    Problem Details: The patient self-scales this problem as a 5 with 10 being the worst.        Goal Priority Start Date Expected End Date End Date    Patient will demonstrate the ability to initiate new constructive life skills outside of sessions on a consistent basis. -- 08/20/21 -- --    Goal Details: Progress toward goal:  The patient self-scales their progress related to this goal as a 7 with 10 being the worst.        Goal Intervention Frequency Start Date End Date    Assist patient in setting attainable activities of daily living goals. PRN 08/20/21 --    Goal Intervention Frequency Start Date End Date    Provide education about depression PRN 08/20/21 --    Intervention Details: Duration of treatment until until discharged.        Goal Intervention Frequency Start Date End Date    Assist patient in developing healthy coping strategies. Q Month 08/20/21 --     Intervention Details: Duration of treatment until until discharged.                           I have discussed and reviewed this treatment plan with the patient.      2

## 2022-05-06 NOTE — PROGRESS NOTES
Date: May 6, 2022  Time In: 932  Time Out:952  This provider is located at the [facility] (through [facility]), [address], [place] [address] using a secure [employer] Video Visit through [employer]. Patient is being seen remotely via telehealth at home address in [place] and stated they are in a secure environment for this session. The patient's condition being diagnosed/treated is appropriate for telemedicine. The provider identified herself as well as her credentials. The patient, and/or patients guardian, consent to be seen remotely, and when consent is given they understand that the consent allows for patient identifiable information to be sent to a third party as needed. They may refuse to be seen remotely at any time. The electronic data is encrypted and password protected, and the patient and/or guardian has been advised of the potential risks to privacy not withstanding such measures.     You have chosen to receive care through a telehealth visit.  Do you consent to use a video/audio connection for your medical care today? Yes    PROGRESS NOTE  Data:  Hoa Lewis is a 31 y.o. female who presents today for follow up    Chief Complaint: Anxiety and depression    History of Present Illness: PT discussed improved mood stating that depression is still present but has the ability to carry on with daily routine and tasks. Pt reports that her dad has started therapy as well as medication management and informed pt that main trigger for him was health; pt reports that she has became her own advocate for her health this past year and finds it liberating to have the ability to care for herself. Pt reports that she does find herself struggling at certain times regarding her  pet cat that she had for 15 years. Pt reports how emotional supportive this pet was.       Clinical Maneuvering/Intervention:    (Scales based on 0 - 10 with 10 being the  worst)  Depression: 2-3 Anxiety: 1     Assisted patient in processing above session content; acknowledged and normalized patient’s thoughts, feelings, and concerns.  Rationalized patient thought process regarding taking care of own health and grief associated with loss of loved ones. Discussed triggers associated with patient's mood.  Also discussed coping skills for patient to implement such as allowing basil to self when mourning loss and discussed gratitude of time spent together.     Allowed patient to freely discuss issues without interruption or judgment. Provided safe, confidential environment to facilitate the development of positive therapeutic relationship and encourage open, honest communication. Assisted patient in identifying risk factors which would indicate the need for higher level of care including thoughts to harm self or others and/or self-harming behavior and encouraged patient to contact this office, call 911, or present to the nearest emergency room should any of these events occur. Discussed crisis intervention services and means to access. Patient adamantly and convincingly denies current suicidal or homicidal ideation or perceptual disturbance.    Assessment:   Assessment   Patient appears to maintain relative stability as compared to their baseline.  However, patient continues to struggle with anxiety and depression which continues to cause impairment in important areas of functioning.  A result, they can be reasonably expected to continue to benefit from treatment and would likely be at increased risk for decompensation otherwise.    Mental Status Exam:   Hygiene:   good  Cooperation:  Cooperative  Eye Contact:  Good  Psychomotor Behavior:  Appropriate  Affect:  Appropriate  Mood: normal  Speech:  Normal  Thought Process:  Linear  Thought Content:  Normal  Suicidal:  None  Homicidal:  None  Hallucinations:  None  Delusion:  None  Memory:  Intact  Orientation:  Person, Place, Time and  Situation  Reliability:  fair  Insight:  Fair  Judgement:  Fair  Impulse Control:  Fair  Physical/Medical Issues:  No        Patient's Support Network Includes:  mother    Functional Status: Mild impairment     Progress toward goal: Not at goal    Prognosis: Fair with Ongoing Treatment          Plan:    Patient will continue in individual outpatient therapy with focus on improved functioning and coping skills, maintaining stability, and avoiding decompensation and the need for higher level of care.    Patient will adhere to medication regimen as prescribed and report any side effects. Patient will contact this office, call 911 or present to the nearest emergency room should suicidal or homicidal ideations occur. Provide Cognitive Behavioral Therapy and Solution Focused Therapy to improve functioning, maintain stability, and avoid decompensation and the need for higher level of care.     Return in about 4 weeks, or earlier if symptoms worsen or fail to improve.           VISIT DIAGNOSIS:     ICD-10-CM ICD-9-CM   1. Generalized anxiety disorder  F41.1 300.02   2. Moderate episode of recurrent major depressive disorder (HCC)  F33.1 296.32          [facility] No Show Policy:  We understand unexpected circumstances arise; however, anytime you miss your appointment we are unable to provide you appropriate care.  In addition, each appointment missed could have been used to provide care for others.  We ask that you call at least 24 hours in advance to cancel or reschedule an appointment.  We would like to take this opportunity to remind you of our policy stating patients who miss THREE or more appointments without cancelling or rescheduling 24 hours in advance of the appointment may be subject to cancellation of any further visits with our clinic and recommendation to seek in-person services/visits.    Please call [phone] to reschedule your appointment. If there are reasons that make it  difficult for you to keep the appointments, please call and let us know how we can help.  Please understand that medication prescribing will not continue without seeing your provider.      [facility]'s No Show Policy reviewed with patient at today's visit. Patient verbalized understanding of this policy. Discussed with patient that in the event that there are three or more no show visits, it will be recommended that they pursue in-person services/visits as noncompliance with telehealth visits indicates that patient is not an appropriate candidate for telemedicine and would likely be more appropriate for in-person services/visits. Patient verbalizes understanding and is agreeable to this.        This document has been electronically signed by Hoa Bettencourt LCSW  May 6, 2022 09:56 EDT      Part of this note may be an electronic transcription/translation of spoken language to printed text using the Dragon Dictation System.

## 2022-05-18 ENCOUNTER — TELEMEDICINE (OUTPATIENT)
Dept: PSYCHIATRY | Facility: CLINIC | Age: 32
End: 2022-05-18

## 2022-05-18 DIAGNOSIS — F41.1 GENERALIZED ANXIETY DISORDER: Chronic | ICD-10-CM

## 2022-05-18 DIAGNOSIS — F33.1 MODERATE EPISODE OF RECURRENT MAJOR DEPRESSIVE DISORDER: Chronic | ICD-10-CM

## 2022-05-18 PROCEDURE — 99214 OFFICE O/P EST MOD 30 MIN: CPT | Performed by: NURSE PRACTITIONER

## 2022-05-18 RX ORDER — BUPROPION HYDROCHLORIDE 150 MG/1
150 TABLET ORAL EVERY MORNING
Qty: 90 TABLET | Refills: 1 | Status: SHIPPED | OUTPATIENT
Start: 2022-05-18 | End: 2022-11-01 | Stop reason: SDUPTHER

## 2022-05-18 NOTE — PROGRESS NOTES
"This provider is located at [facility], [address] [place] [address].The Patient is seen remotely at home,  [place] [address] via [facility].  Patient is being seen via telehealth and confirm that they are in a secure environment for this session. The patient's condition being diagnosed/treated is appropriate for telemedicine. The provider identified himself/herself: herself as well as her credentials.   The patient gave consent to be seen remotely, and when consent is given they understand that the consent allows for patient identifiable information to be sent to a third party as needed.   They may refuse to be seen remotely at any time. The electronic data is encrypted and password protected, and the patient has been advised of the potential risks to privacy not withstanding such measures.    You have chosen to receive care through a telehealth visit.  Do you consent to use a video/audio connection for your medical care today? Yes. Patient verified Name, , and address.       Chief Complaint  Anxiety and depression     Subjective    Hoa Abril Lewis presents to [facility] management.     History of Present Illness   Patient presents today reporting that she is doing \"pretty good\".  She notes that the increase in sertraline was very helpful as she has not had as many anxious episodes.  She states that she may get busy with work or have an occasional episodes of anxiety but states she manages them well.  Patient denies any major depressive symptoms or hopelessness or helplessness.  She states that she feels her depression is level as she will have an occasional thought crossed her mind but she is able to manage and use her therapy skills.  Rates depression and anxiety at 3-4 on a scale of 0-10 with 10 being the worst.  Patient reports appetite is unchanged and good.  Reports that she is \"sleeping really " "good\".  Patient states occasionally she will get drowsy with the sertraline but she takes in the evening and denies any other side effects to the medications.  Denies any SI/HI/AH/VH.        Objective   Vital Signs:   There were no vitals taken for this visit.  Due to the remote nature of this encounter (virtual encounter), vitals were unable to be obtained.  Height stated at 67.9 inches.  Weight stated at 295 pounds.        PHQ-9 Score:   PHQ-9 Total Score:       Patient screened positive for depression based on a PHQ-9 score of  on . Follow-up recommendations include: see notes and medication list.    PHQ-9 Depression Screening  Little interest or pleasure in doing things?     Feeling down, depressed, or hopeless?     Trouble falling or staying asleep, or sleeping too much?     Feeling tired or having little energy?     Poor appetite or overeating?     Feeling bad about yourself - or that you are a failure or have let yourself or your family down?     Trouble concentrating on things, such as reading the newspaper or watching television?     Moving or speaking so slowly that other people could have noticed? Or the opposite - being so fidgety or restless that you have been moving around a lot more than usual?     Thoughts that you would be better off dead, or of hurting yourself in some way?     PHQ-9 Total Score     If you checked off any problems, how difficult have these problems made it for you to do your work, take care of things at home, or get along with other people?                  PROMIS scale screening tool that patient filled out virtually reviewed by this APRN at today's encounter.      Mental Status Exam:   Hygiene:   good  Cooperation:  Cooperative  Eye Contact:  Fair  Psychomotor Behavior:  Restless  Affect:  Appropriate  Mood: normal  Speech:  Normal  Thought Process:  Goal directed  Thought Content:  Normal  Suicidal:  None  Homicidal:  None  Hallucinations:  None  Delusion:  None  Memory:  " Intact  Orientation:  Person, Place, Time and Situation  Reliability:  good  Insight:  Good and Fair  Judgement:  Good and Fair  Impulse Control:  Good and Fair  Physical/Medical Issues:  No      Current Medications:   Current Outpatient Medications   Medication Sig Dispense Refill   • buPROPion XL (WELLBUTRIN XL) 150 MG 24 hr tablet Take 1 tablet by mouth Every Morning. 90 tablet 1   • sertraline (Zoloft) 50 MG tablet Take 1 tablet by mouth Daily for 90 days. 90 tablet 0   • Cholecalciferol (Vitamin D3) 1.25 MG (47364 UT) capsule TAKE 1 CAPSULE BY MOUTH EVERY 7 DAYS FOR 8 DOSES 8 capsule 0   • Tri-Lo-Sprintec 0.18/0.215/0.25 MG-25 MCG per tablet TAKE 1 TABLET BY MOUTH DAILY 28 tablet 2   • triamcinolone (KENALOG) 0.5 % ointment Apply  topically to the appropriate area as directed 2 (Two) Times a Day. 30 g 5   • vitamin B-12 (CYANOCOBALAMIN) 1000 MCG tablet Take 1 tablet by mouth Daily. 90 tablet 1     No current facility-administered medications for this visit.       Physical Exam  Nursing note reviewed.   Constitutional:       Appearance: Normal appearance.   Neurological:      Mental Status: She is alert.   Psychiatric:         Attention and Perception: Attention and perception normal.         Mood and Affect: Mood and affect normal. Mood is not anxious or depressed.         Speech: Speech normal.         Behavior: Behavior is cooperative.         Thought Content: Thought content normal.         Cognition and Memory: Cognition and memory normal.         Judgment: Judgment normal.        Result Review :     The following data was reviewed by: SHARATH Cornell on 03/17/2022:  Common labs    Common Labsle 8/27/21 8/27/21 8/27/21 9/7/21    0948 0948 0948    Glucose  86     BUN  9     Creatinine  0.63     eGFR Non African Am  112     Sodium  136     Potassium  4.3     Chloride  104     Calcium  8.8     Albumin  3.80     Total Bilirubin  0.3     Alkaline Phosphatase  70     AST (SGOT)  9     ALT (SGPT)  13      WBC   8.50 7.15   Hemoglobin   10.8 (A) 11.6 (A)   Hematocrit   34.6 35.9   Platelets   332 359   Total Cholesterol 222 (A)      Triglycerides 253 (A)      HDL Cholesterol 68 (A)      LDL Cholesterol  111 (A)      (A) Abnormal value            CMP    CMP 8/27/21   Glucose 86   BUN 9   Creatinine 0.63   eGFR Non African Am 112   Sodium 136   Potassium 4.3   Chloride 104   Calcium 8.8   Albumin 3.80   Total Bilirubin 0.3   Alkaline Phosphatase 70   AST (SGOT) 9   ALT (SGPT) 13           CBC    CBC 8/27/21 9/7/21   WBC 8.50 7.15   RBC 4.09 4.41   Hemoglobin 10.8 (A) 11.6 (A)   Hematocrit 34.6 35.9   MCV 84.6 81.4   MCH 26.4 (A) 26.3 (A)   MCHC 31.2 (A) 32.3   RDW 13.9 13.5   Platelets 332 359   (A) Abnormal value            CBC w/diff    CBC w/Diff 8/27/21 9/7/21   WBC 8.50 7.15   RBC 4.09 4.41   Hemoglobin 10.8 (A) 11.6 (A)   Hematocrit 34.6 35.9   MCV 84.6 81.4   MCH 26.4 (A) 26.3 (A)   MCHC 31.2 (A) 32.3   RDW 13.9 13.5   Platelets 332 359   Neutrophil Rel % 58.8 54.7   Immature Granulocyte Rel % 0.7 (A) 0.6 (A)   Lymphocyte Rel % 32.1 35.2   Monocyte Rel % 5.8 6.7   Eosinophil Rel % 1.8 1.8   Basophil Rel % 0.8 1.0   (A) Abnormal value            Lipid Panel    Lipid Panel 8/27/21   Total Cholesterol 222 (A)   Triglycerides 253 (A)   HDL Cholesterol 68 (A)   VLDL Cholesterol 43 (A)   LDL Cholesterol  111 (A)   LDL/HDL Ratio 1.52   (A) Abnormal value            TSH    TSH 8/27/21   TSH 4.520 (A)   (A) Abnormal value            Electrolytes    Electrolytes 8/27/21   Sodium 136   Potassium 4.3   Chloride 104   Calcium 8.8           Renal Profile    Renal Profile 8/27/21   BUN 9   Creatinine 0.63   eGFR Non African Am 112                   Data reviewed: PCP notes        Assessment and Plan    Problem List Items Addressed This Visit        Mental Health    Moderate episode of recurrent major depressive disorder (HCC)    Relevant Medications    sertraline (Zoloft) 50 MG tablet    buPROPion XL (WELLBUTRIN XL) 150 MG 24  hr tablet      Other Visit Diagnoses     Generalized anxiety disorder  (Chronic)       Relevant Medications    sertraline (Zoloft) 50 MG tablet    buPROPion XL (WELLBUTRIN XL) 150 MG 24 hr tablet            TREATMENT PLAN/GOALS: Continue supportive psychotherapy efforts and medications as indicated. Treatment and medication options discussed during today's visit. Patient ackowledged and verbally consented to continue with current treatment plan and was educated on the importance of compliance with treatment and follow-up appointments.    MEDICATION ISSUES:  We discussed risks, benefits, and side effects of the above medications and the patient was agreeable with the plan. Patient was educated on the importance of compliance with treatment and follow-up appointments.  Patient is agreeable to call the office with any worsening of symptoms or onset of side effects. Patient is agreeable to call 911 or go to the nearest ER should he/she begin having SI/HI. Patient was strongly encouraged to continue birth control.  Patient was counseled regarding need not to become pregnant prior to discussion and possible titration and discontinuation of medications.  An explanation was provided to the patient regarding the risk of fetal harm with psychotrophic medications.  Patient was provided education regarding both risk of continuing and discontinuing medications during pregnancy.  Patient verbalized understanding.        -Continue Wellbutrin 150 mg XL daily as adjunct for depression.  -Continue sertraline 50 mg daily for depression and anxiety symptoms.  -Continue psychotherapy with Hoa Bettencourt.    Counseled patient regarding multimodal approach with healthy nutrition, healthy sleep, regular physical activity, social activities, counseling, and medications.      Coping skills reviewed and encouraged positive framing of thoughts     Assisted patient in processing above session content; acknowledged and normalized patient’s  thoughts, feelings, and concerns.  Applied  positive coping skills and behavior management in session.  Allowed patient to freely discuss issues without interruption or judgment. Provided safe, confidential environment to facilitate the development of positive therapeutic relationship and encourage open, honest communication. Assisted patient in identifying risk factors which would indicate the need for higher level of care including thoughts to harm self or others and/or self-harming behavior and encouraged patient to contact this office, call 911, or present to the nearest emergency room should any of these events occur. Discussed crisis intervention services and means to access.     MEDS ORDERED DURING VISIT:  New Medications Ordered This Visit   Medications   • sertraline (Zoloft) 50 MG tablet     Sig: Take 1 tablet by mouth Daily for 90 days.     Dispense:  90 tablet     Refill:  0   • buPROPion XL (WELLBUTRIN XL) 150 MG 24 hr tablet     Sig: Take 1 tablet by mouth Every Morning.     Dispense:  90 tablet     Refill:  1           Follow Up   Return in about 4 weeks (around 6/15/2022), or if symptoms worsen or fail to improve, for Recheck.    Patient was given instructions and counseling regarding her condition or for health maintenance advice. Please see specific information pulled into the AVS if appropriate.     Some of the data in this electronic note has been brought forward from a previous encounter, any necessary changes have been made, it has been reviewed by this APRN, and it is accurate.      This document has been electronically signed by SHARATH Cornell  May 18, 2022 08:51 EDT    Part of this note may be an electronic transcription/translation of spoken language to printed text using the Dragon Dictation System.

## 2022-06-15 ENCOUNTER — TELEMEDICINE (OUTPATIENT)
Dept: PSYCHIATRY | Facility: CLINIC | Age: 32
End: 2022-06-15

## 2022-06-15 DIAGNOSIS — F33.0 MILD EPISODE OF RECURRENT MAJOR DEPRESSIVE DISORDER: ICD-10-CM

## 2022-06-15 DIAGNOSIS — F41.1 GENERALIZED ANXIETY DISORDER: Chronic | ICD-10-CM

## 2022-06-15 PROCEDURE — 99214 OFFICE O/P EST MOD 30 MIN: CPT | Performed by: NURSE PRACTITIONER

## 2022-06-15 NOTE — PROGRESS NOTES
"This provider is located at [facility], [address] [place] [address].The Patient is seen remotely at home,  [place] [address] via [employer] [facility].  Patient is being seen via telehealth and confirm that they are in a secure environment for this session. The patient's condition being diagnosed/treated is appropriate for telemedicine. The provider identified himself/herself: herself as well as her credentials.   The patient gave consent to be seen remotely, and when consent is given they understand that the consent allows for patient identifiable information to be sent to a third party as needed.   They may refuse to be seen remotely at any time. The electronic data is encrypted and password protected, and the patient has been advised of the potential risks to privacy not withstanding such measures.    You have chosen to receive care through a telehealth visit.  Do you consent to use a video/audio connection for your medical care today? Yes. Patient verified Name, , and address.       Chief Complaint  Anxiety and depression     Subjective    Hoa Abril Lewis presents to [facility].     History of Present Illness   Patient presents today reporting that she is doing \"pretty good\".  She notes \"not much anxiety or anything\".  She states that she is sleeping good with no issues roughly 7 to 8 hours.  She reports her appetite is good.  Rates depression and anxiety a 3 on a scale of 0-10 with 10 being the worst.  Patient notes that it has not been as noticeable or when it is present it is very mild.  Denies any side effects to the medications.  Patient reports that it is hard to keep her attention to reading or watching videos mostly at home as she states everything else seems to be manageable.  Encouraged her that instant gratification with videos can be easy to switch back and forth so set a time for herself " and not allow herself to move as quickly and set a time for herself to read as well as encourage her and her to be more active to help with her focus and attention patient verbalized understanding.  Denies any SI/HI/AH/VH.        Objective   Vital Signs:   There were no vitals taken for this visit.  Due to the remote nature of this encounter (virtual encounter), vitals were unable to be obtained.  Height stated at 67.9 inches.  Weight stated at 295 pounds.        PHQ-9 Score:   PHQ-9 Total Score:       Patient screened positive for depression based on a PHQ-9 score of  on . Follow-up recommendations include: see notes and medication list.    PHQ-9 Depression Screening  Little interest or pleasure in doing things?     Feeling down, depressed, or hopeless?     Trouble falling or staying asleep, or sleeping too much?     Feeling tired or having little energy?     Poor appetite or overeating?     Feeling bad about yourself - or that you are a failure or have let yourself or your family down?     Trouble concentrating on things, such as reading the newspaper or watching television?     Moving or speaking so slowly that other people could have noticed? Or the opposite - being so fidgety or restless that you have been moving around a lot more than usual?     Thoughts that you would be better off dead, or of hurting yourself in some way?     PHQ-9 Total Score     If you checked off any problems, how difficult have these problems made it for you to do your work, take care of things at home, or get along with other people?                  PROMIS scale screening tool that patient filled out virtually reviewed by this APRN at today's encounter.      Mental Status Exam:   Hygiene:   good  Cooperation:  Cooperative  Eye Contact:  Fair  Psychomotor Behavior:  Restless  Affect:  Appropriate  Mood: normal  Speech:  Normal  Thought Process:  Goal directed  Thought Content:  Normal  Suicidal:  None  Homicidal:  None  Hallucinations:   None  Delusion:  None  Memory:  Intact  Orientation:  Person, Place, Time and Situation  Reliability:  good  Insight:  Good and Fair  Judgement:  Good and Fair  Impulse Control:  Good and Fair  Physical/Medical Issues:  No      Current Medications:   Current Outpatient Medications   Medication Sig Dispense Refill   • sertraline (Zoloft) 50 MG tablet Take 1 tablet by mouth Daily for 90 days. 90 tablet 0   • buPROPion XL (WELLBUTRIN XL) 150 MG 24 hr tablet Take 1 tablet by mouth Every Morning. 90 tablet 1   • Cholecalciferol (Vitamin D3) 1.25 MG (80825 UT) capsule TAKE 1 CAPSULE BY MOUTH EVERY 7 DAYS FOR 8 DOSES 8 capsule 0   • Tri-Lo-Sprintec 0.18/0.215/0.25 MG-25 MCG per tablet TAKE 1 TABLET BY MOUTH DAILY 28 tablet 2   • triamcinolone (KENALOG) 0.5 % ointment Apply  topically to the appropriate area as directed 2 (Two) Times a Day. 30 g 5   • vitamin B-12 (CYANOCOBALAMIN) 1000 MCG tablet Take 1 tablet by mouth Daily. 90 tablet 1     No current facility-administered medications for this visit.       Physical Exam  Nursing note reviewed.   Constitutional:       Appearance: Normal appearance.   Neurological:      Mental Status: She is alert.   Psychiatric:         Attention and Perception: Attention and perception normal.         Mood and Affect: Mood and affect normal. Mood is not anxious or depressed.         Speech: Speech normal.         Behavior: Behavior is cooperative.         Thought Content: Thought content normal.         Cognition and Memory: Cognition and memory normal.         Judgment: Judgment normal.        Result Review :     The following data was reviewed by: SHARATH Cornell on 03/17/2022:  Common labs    Common Labsle 8/27/21 8/27/21 8/27/21 9/7/21    0948 0948 0948    Glucose  86     BUN  9     Creatinine  0.63     eGFR Non African Am  112     Sodium  136     Potassium  4.3     Chloride  104     Calcium  8.8     Albumin  3.80     Total Bilirubin  0.3     Alkaline Phosphatase  70     AST  (SGOT)  9     ALT (SGPT)  13     WBC   8.50 7.15   Hemoglobin   10.8 (A) 11.6 (A)   Hematocrit   34.6 35.9   Platelets   332 359   Total Cholesterol 222 (A)      Triglycerides 253 (A)      HDL Cholesterol 68 (A)      LDL Cholesterol  111 (A)      (A) Abnormal value            CMP    CMP 8/27/21   Glucose 86   BUN 9   Creatinine 0.63   eGFR Non African Am 112   Sodium 136   Potassium 4.3   Chloride 104   Calcium 8.8   Albumin 3.80   Total Bilirubin 0.3   Alkaline Phosphatase 70   AST (SGOT) 9   ALT (SGPT) 13           CBC    CBC 8/27/21 9/7/21   WBC 8.50 7.15   RBC 4.09 4.41   Hemoglobin 10.8 (A) 11.6 (A)   Hematocrit 34.6 35.9   MCV 84.6 81.4   MCH 26.4 (A) 26.3 (A)   MCHC 31.2 (A) 32.3   RDW 13.9 13.5   Platelets 332 359   (A) Abnormal value            CBC w/diff    CBC w/Diff 8/27/21 9/7/21   WBC 8.50 7.15   RBC 4.09 4.41   Hemoglobin 10.8 (A) 11.6 (A)   Hematocrit 34.6 35.9   MCV 84.6 81.4   MCH 26.4 (A) 26.3 (A)   MCHC 31.2 (A) 32.3   RDW 13.9 13.5   Platelets 332 359   Neutrophil Rel % 58.8 54.7   Immature Granulocyte Rel % 0.7 (A) 0.6 (A)   Lymphocyte Rel % 32.1 35.2   Monocyte Rel % 5.8 6.7   Eosinophil Rel % 1.8 1.8   Basophil Rel % 0.8 1.0   (A) Abnormal value            Lipid Panel    Lipid Panel 8/27/21   Total Cholesterol 222 (A)   Triglycerides 253 (A)   HDL Cholesterol 68 (A)   VLDL Cholesterol 43 (A)   LDL Cholesterol  111 (A)   LDL/HDL Ratio 1.52   (A) Abnormal value            TSH    TSH 8/27/21   TSH 4.520 (A)   (A) Abnormal value            Electrolytes    Electrolytes 8/27/21   Sodium 136   Potassium 4.3   Chloride 104   Calcium 8.8           Renal Profile    Renal Profile 8/27/21   BUN 9   Creatinine 0.63   eGFR Non African Am 112                   Data reviewed: PCP notes        Assessment and Plan    Problem List Items Addressed This Visit        Mental Health    Moderate episode of recurrent major depressive disorder (HCC)    Relevant Medications    sertraline (Zoloft) 50 MG tablet      Other  Visit Diagnoses     Generalized anxiety disorder  (Chronic)       Relevant Medications    sertraline (Zoloft) 50 MG tablet            TREATMENT PLAN/GOALS: Continue supportive psychotherapy efforts and medications as indicated. Treatment and medication options discussed during today's visit. Patient ackowledged and verbally consented to continue with current treatment plan and was educated on the importance of compliance with treatment and follow-up appointments.    MEDICATION ISSUES:  We discussed risks, benefits, and side effects of the above medications and the patient was agreeable with the plan. Patient was educated on the importance of compliance with treatment and follow-up appointments.  Patient is agreeable to call the office with any worsening of symptoms or onset of side effects. Patient is agreeable to call 911 or go to the nearest ER should he/she begin having SI/HI. Patient was strongly encouraged to continue birth control.  Patient was counseled regarding need not to become pregnant prior to discussion and possible titration and discontinuation of medications.  An explanation was provided to the patient regarding the risk of fetal harm with psychotrophic medications.  Patient was provided education regarding both risk of continuing and discontinuing medications during pregnancy.  Patient verbalized understanding.        -Continue Wellbutrin 150 mg XL daily as adjunct for depression.  -Continue sertraline 50 mg daily for depression and anxiety symptoms.  -Continue psychotherapy with Hoa Bettencourt.    Counseled patient regarding multimodal approach with healthy nutrition, healthy sleep, regular physical activity, social activities, counseling, and medications.      Coping skills reviewed and encouraged positive framing of thoughts     Assisted patient in processing above session content; acknowledged and normalized patient’s thoughts, feelings, and concerns.  Applied  positive coping skills and behavior  management in session.  Allowed patient to freely discuss issues without interruption or judgment. Provided safe, confidential environment to facilitate the development of positive therapeutic relationship and encourage open, honest communication. Assisted patient in identifying risk factors which would indicate the need for higher level of care including thoughts to harm self or others and/or self-harming behavior and encouraged patient to contact this office, call 911, or present to the nearest emergency room should any of these events occur. Discussed crisis intervention services and means to access.     MEDS ORDERED DURING VISIT:  New Medications Ordered This Visit   Medications   • sertraline (Zoloft) 50 MG tablet     Sig: Take 1 tablet by mouth Daily for 90 days.     Dispense:  90 tablet     Refill:  0           Follow Up   Return in about 6 weeks (around 7/27/2022), or if symptoms worsen or fail to improve.    Patient was given instructions and counseling regarding her condition or for health maintenance advice. Please see specific information pulled into the AVS if appropriate.     Some of the data in this electronic note has been brought forward from a previous encounter, any necessary changes have been made, it has been reviewed by this APRN, and it is accurate.      This document has been electronically signed by SHARATH Cornell  Amanda 15, 2022 08:50 EDT    Part of this note may be an electronic transcription/translation of spoken language to printed text using the Dragon Dictation System.

## 2022-06-24 ENCOUNTER — TELEMEDICINE (OUTPATIENT)
Dept: PSYCHIATRY | Facility: CLINIC | Age: 32
End: 2022-06-24

## 2022-06-24 DIAGNOSIS — F33.0 MILD EPISODE OF RECURRENT MAJOR DEPRESSIVE DISORDER: Primary | ICD-10-CM

## 2022-06-24 DIAGNOSIS — F41.1 GENERALIZED ANXIETY DISORDER: ICD-10-CM

## 2022-06-24 PROCEDURE — 90832 PSYTX W PT 30 MINUTES: CPT | Performed by: COUNSELOR

## 2022-06-24 NOTE — PROGRESS NOTES
Date: June 24, 2022  Time In: 0929  Time Out: 0946  This provider is located at the [facility] (through [facility]), [address], [place] [address] using a secure [employer] Video Visit through [employer] system. Patient is being seen remotely via telehealth at home address in [place] and stated they are in a secure environment for this session. The patient's condition being diagnosed/treated is appropriate for telemedicine. The provider identified herself as well as her credentials. The patient, and/or patients guardian, consent to be seen remotely, and when consent is given they understand that the consent allows for patient identifiable information to be sent to a third party as needed. They may refuse to be seen remotely at any time. The electronic data is encrypted and password protected, and the patient and/or guardian has been advised of the potential risks to privacy not withstanding such measures.     You have chosen to receive care through a telehealth visit.  Do you consent to use a video/audio connection for your medical care today? Yes    PROGRESS NOTE  Data:  Hoa Lewis is a 31 y.o. female who presents today for follow up    Chief Complaint: Anxiety     History of Present Illness: Pt reports that she has been feeling really well. Pt reports that since increase of medication she has not felt anxious or depressed. 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 Pt reports that she is worried that one day she will be back in a negative mindset and be depressed again which scares her. Pt reports feeling almost as if she is waiting for the negative mindset to return.       Clinical Maneuvering/Intervention:    (Scales based on 0 - 10 with 10 being the worst)  Depression: 1 Anxiety: 1       Assisted patient in processing above session content; acknowledged and normalized patient’s thoughts, feelings, and concerns.  Rationalized patient thought  process regarding feeling anxious and fearful of depression returning.  Discussed triggers associated with patient's mood.  Also discussed coping skills for patient to implement such as becoming aware of signs and symptoms of depression to notice depression increases if occurs. Discussed open communication and having safety plans established with brother so he to can be aware of signs and symptoms of depression regarding patient.     Allowed patient to freely discuss issues without interruption or judgment. Provided safe, confidential environment to facilitate the development of positive therapeutic relationship and encourage open, honest communication. Assisted patient in identifying risk factors which would indicate the need for higher level of care including thoughts to harm self or others and/or self-harming behavior and encouraged patient to contact this office, call 911, or present to the nearest emergency room should any of these events occur. Discussed crisis intervention services and means to access. Patient adamantly and convincingly denies current suicidal or homicidal ideation or perceptual disturbance.    Assessment:   Assessment   Patient appears to maintain relative stability as compared to their baseline.  However, patient continues to struggle with depression and anxiety which continues to cause impairment in important areas of functioning.  A result, they can be reasonably expected to continue to benefit from treatment and would likely be at increased risk for decompensation otherwise.    Mental Status Exam:   Hygiene:   good  Cooperation:  Cooperative  Eye Contact:  Good  Psychomotor Behavior:  Appropriate  Affect:  Appropriate  Mood: normal  Speech:  Normal  Thought Process:  Linear  Thought Content:  Normal  Suicidal:  None  Homicidal:  None  Hallucinations:  None  Delusion:  None  Memory:  Intact  Orientation:  Person, Place, Time and Situation  Reliability:  fair  Insight:  Fair  Judgement:   Fair  Impulse Control:  Fair  Physical/Medical Issues:  No        Patient's Support Network Includes:  brother    Functional Status: Mild impairment     Progress toward goal: Not at goal    Prognosis: Fair with Ongoing Treatment          Plan:    Patient will continue in individual outpatient therapy with focus on improved functioning and coping skills, maintaining stability, and avoiding decompensation and the need for higher level of care.    Patient will adhere to medication regimen as prescribed and report any side effects. Patient will contact this office, call 911 or present to the nearest emergency room should suicidal or homicidal ideations occur. Provide Cognitive Behavioral Therapy and Solution Focused Therapy to improve functioning, maintain stability, and avoid decompensation and the need for higher level of care.     Return in about 6 weeks, or earlier if symptoms worsen or fail to improve.           VISIT DIAGNOSIS:     ICD-10-CM ICD-9-CM   1. Mild episode of recurrent major depressive disorder (HCC)  F33.0 296.31   2. Generalized anxiety disorder  F41.1 300.02          [facility] No Show Policy:  We understand unexpected circumstances arise; however, anytime you miss your appointment we are unable to provide you appropriate care.  In addition, each appointment missed could have been used to provide care for others.  We ask that you call at least 24 hours in advance to cancel or reschedule an appointment.  We would like to take this opportunity to remind you of our policy stating patients who miss THREE or more appointments without cancelling or rescheduling 24 hours in advance of the appointment may be subject to cancellation of any further visits with our clinic and recommendation to seek in-person services/visits.    Please call [phone] to reschedule your appointment. If there are reasons that make it difficult for you to keep the appointments, please call and let us  know how we can help.  Please understand that medication prescribing will not continue without seeing your provider.      [facility]'s No Show Policy reviewed with patient at today's visit. Patient verbalized understanding of this policy. Discussed with patient that in the event that there are three or more no show visits, it will be recommended that they pursue in-person services/visits as noncompliance with telehealth visits indicates that patient is not an appropriate candidate for telemedicine and would likely be more appropriate for in-person services/visits. Patient verbalizes understanding and is agreeable to this.        This document has been electronically signed by Hoa Bettencorut LCSW  June 24, 2022 09:51 EDT      Part of this note may be an electronic transcription/translation of spoken language to printed text using the Dragon Dictation System.

## 2022-07-08 DIAGNOSIS — N92.0 MENORRHAGIA WITH REGULAR CYCLE: ICD-10-CM

## 2022-07-08 RX ORDER — NORGESTIMATE AND ETHINYL ESTRADIOL
KIT
Qty: 28 TABLET | Refills: 2 | Status: SHIPPED | OUTPATIENT
Start: 2022-07-08 | End: 2022-11-03

## 2022-07-21 ENCOUNTER — TELEMEDICINE (OUTPATIENT)
Dept: PSYCHIATRY | Facility: CLINIC | Age: 32
End: 2022-07-21

## 2022-07-21 DIAGNOSIS — F33.0 MILD EPISODE OF RECURRENT MAJOR DEPRESSIVE DISORDER: ICD-10-CM

## 2022-07-21 DIAGNOSIS — F41.1 GENERALIZED ANXIETY DISORDER: Chronic | ICD-10-CM

## 2022-07-21 PROCEDURE — 99214 OFFICE O/P EST MOD 30 MIN: CPT | Performed by: NURSE PRACTITIONER

## 2022-07-21 NOTE — PROGRESS NOTES
"This provider is located at [facility], [address] [place] [address].The Patient is seen remotely at home,  [place] [address] via [employer].  Patient is being seen via telehealth and confirm that they are in a secure environment for this session. The patient's condition being diagnosed/treated is appropriate for telemedicine. The provider identified himself/herself: herself as well as her credentials.   The patient gave consent to be seen remotely, and when consent is given they understand that the consent allows for patient identifiable information to be sent to a third party as needed.   They may refuse to be seen remotely at any time. The electronic data is encrypted and password protected, and the patient has been advised of the potential risks to privacy not withstanding such measures.    You have chosen to receive care through a telehealth visit.  Do you consent to use a video/audio connection for your medical care today? Yes. Patient verified Name, , and address.       Chief Complaint  Anxiety and depression     Subjective    Hoa Abril Lewis presents to [facility] management.     History of Present Illness   Patient presents today reporting that she is doing good.  She denies any hopeless or helpless symptoms.  She notes sometimes she may have passing thoughts and feel down but she states that she does not get upset and they pass quickly.  When asking patient about her anxiety she states it is \"pretty good\".  Denies any significant symptoms or concerns.  Patient notes therapy is going well.  States she is sleeping and eating well.  Patient denies any side effects other than occasional acid reflux when she takes her medication in the morning.  Denies any SI/HI/AH/VH.        Objective   Vital Signs:   There were no vitals taken for this visit.  Due to the remote nature of this encounter (virtual " encounter), vitals were unable to be obtained.  Height stated at 67.9 inches.  Weight stated at 295 pounds.        PHQ-9 Score:   PHQ-9 Total Score:       Patient screened positive for depression based on a PHQ-9 score of  on . Follow-up recommendations include: see notes and medication list.    PHQ-9 Depression Screening  Little interest or pleasure in doing things?     Feeling down, depressed, or hopeless?     Trouble falling or staying asleep, or sleeping too much?     Feeling tired or having little energy?     Poor appetite or overeating?     Feeling bad about yourself - or that you are a failure or have let yourself or your family down?     Trouble concentrating on things, such as reading the newspaper or watching television?     Moving or speaking so slowly that other people could have noticed? Or the opposite - being so fidgety or restless that you have been moving around a lot more than usual?     Thoughts that you would be better off dead, or of hurting yourself in some way?     PHQ-9 Total Score     If you checked off any problems, how difficult have these problems made it for you to do your work, take care of things at home, or get along with other people?                  PROMIS scale screening tool that patient filled out virtually reviewed by this APRN at today's encounter.      Mental Status Exam:   Hygiene:   good  Cooperation:  Cooperative  Eye Contact:  Fair  Psychomotor Behavior:  Restless  Affect:  Appropriate  Mood: normal  Speech:  Normal  Thought Process:  Goal directed  Thought Content:  Normal  Suicidal:  None  Homicidal:  None  Hallucinations:  None  Delusion:  None  Memory:  Intact  Orientation:  Person, Place, Time and Situation  Reliability:  good  Insight:  Good and Fair  Judgement:  Good and Fair  Impulse Control:  Good and Fair  Physical/Medical Issues:  No      Current Medications:   Current Outpatient Medications   Medication Sig Dispense Refill   • buPROPion XL (WELLBUTRIN XL) 150  MG 24 hr tablet Take 1 tablet by mouth Every Morning. 90 tablet 1   • Cholecalciferol (Vitamin D3) 1.25 MG (78985 UT) capsule TAKE 1 CAPSULE BY MOUTH EVERY 7 DAYS FOR 8 DOSES 8 capsule 0   • sertraline (Zoloft) 50 MG tablet Take 1 tablet by mouth Daily for 90 days. 90 tablet 0   • Tri-Lo-Sprintec 0.18/0.215/0.25 MG-25 MCG per tablet TAKE 1 TABLET BY MOUTH DAILY 28 tablet 2   • triamcinolone (KENALOG) 0.5 % ointment Apply  topically to the appropriate area as directed 2 (Two) Times a Day. 30 g 5   • vitamin B-12 (CYANOCOBALAMIN) 1000 MCG tablet Take 1 tablet by mouth Daily. 90 tablet 1     No current facility-administered medications for this visit.       Physical Exam  Nursing note reviewed.   Constitutional:       Appearance: Normal appearance.   Neurological:      Mental Status: She is alert.   Psychiatric:         Attention and Perception: Attention and perception normal.         Mood and Affect: Mood and affect normal. Mood is not anxious or depressed.         Speech: Speech normal.         Behavior: Behavior is cooperative.         Thought Content: Thought content normal.         Cognition and Memory: Cognition and memory normal.         Judgment: Judgment normal.        Result Review :     The following data was reviewed by: SHARATH Cornell on 03/17/2022:  Common labs    Common Labsle 8/27/21 8/27/21 8/27/21 9/7/21    0948 0948 0948    Glucose  86     BUN  9     Creatinine  0.63     eGFR Non African Am  112     Sodium  136     Potassium  4.3     Chloride  104     Calcium  8.8     Albumin  3.80     Total Bilirubin  0.3     Alkaline Phosphatase  70     AST (SGOT)  9     ALT (SGPT)  13     WBC   8.50 7.15   Hemoglobin   10.8 (A) 11.6 (A)   Hematocrit   34.6 35.9   Platelets   332 359   Total Cholesterol 222 (A)      Triglycerides 253 (A)      HDL Cholesterol 68 (A)      LDL Cholesterol  111 (A)      (A) Abnormal value            CMP    CMP 8/27/21   Glucose 86   BUN 9   Creatinine 0.63   eGFR Non African  Am 112   Sodium 136   Potassium 4.3   Chloride 104   Calcium 8.8   Albumin 3.80   Total Bilirubin 0.3   Alkaline Phosphatase 70   AST (SGOT) 9   ALT (SGPT) 13           CBC    CBC 8/27/21 9/7/21   WBC 8.50 7.15   RBC 4.09 4.41   Hemoglobin 10.8 (A) 11.6 (A)   Hematocrit 34.6 35.9   MCV 84.6 81.4   MCH 26.4 (A) 26.3 (A)   MCHC 31.2 (A) 32.3   RDW 13.9 13.5   Platelets 332 359   (A) Abnormal value            CBC w/diff    CBC w/Diff 8/27/21 9/7/21   WBC 8.50 7.15   RBC 4.09 4.41   Hemoglobin 10.8 (A) 11.6 (A)   Hematocrit 34.6 35.9   MCV 84.6 81.4   MCH 26.4 (A) 26.3 (A)   MCHC 31.2 (A) 32.3   RDW 13.9 13.5   Platelets 332 359   Neutrophil Rel % 58.8 54.7   Immature Granulocyte Rel % 0.7 (A) 0.6 (A)   Lymphocyte Rel % 32.1 35.2   Monocyte Rel % 5.8 6.7   Eosinophil Rel % 1.8 1.8   Basophil Rel % 0.8 1.0   (A) Abnormal value            Lipid Panel    Lipid Panel 8/27/21   Total Cholesterol 222 (A)   Triglycerides 253 (A)   HDL Cholesterol 68 (A)   VLDL Cholesterol 43 (A)   LDL Cholesterol  111 (A)   LDL/HDL Ratio 1.52   (A) Abnormal value            TSH    TSH 8/27/21   TSH 4.520 (A)   (A) Abnormal value            Electrolytes    Electrolytes 8/27/21   Sodium 136   Potassium 4.3   Chloride 104   Calcium 8.8           Renal Profile    Renal Profile 8/27/21   BUN 9   Creatinine 0.63   eGFR Non African Am 112                   Data reviewed: PCP notes        Assessment and Plan    Problem List Items Addressed This Visit    None     Visit Diagnoses     Mild episode of recurrent major depressive disorder (HCC)        Generalized anxiety disorder  (Chronic)               TREATMENT PLAN/GOALS: Continue supportive psychotherapy efforts and medications as indicated. Treatment and medication options discussed during today's visit. Patient ackowledged and verbally consented to continue with current treatment plan and was educated on the importance of compliance with treatment and follow-up appointments.    MEDICATION ISSUES:  We  discussed risks, benefits, and side effects of the above medications and the patient was agreeable with the plan. Patient was educated on the importance of compliance with treatment and follow-up appointments.  Patient is agreeable to call the office with any worsening of symptoms or onset of side effects. Patient is agreeable to call 911 or go to the nearest ER should he/she begin having SI/HI. Patient was strongly encouraged to continue birth control.  Patient was counseled regarding need not to become pregnant prior to discussion and possible titration and discontinuation of medications.  An explanation was provided to the patient regarding the risk of fetal harm with psychotrophic medications.  Patient was provided education regarding both risk of continuing and discontinuing medications during pregnancy.  Patient verbalized understanding.        -Continue Wellbutrin 150 mg XL daily as adjunct for depression.  -Continue sertraline 50 mg daily for depression and anxiety symptoms.  -Continue psychotherapy with Hoa Bettencourt.  -Encourage patient if she noted acid reflux occasionally to take with or without food and see if that makes a difference also she can take over-the-counter antacids patient verbalized understanding.  Encouraged her if anything worsened or became more significant to contact the clinic for sooner appointment she verbalized understanding.    Counseled patient regarding multimodal approach with healthy nutrition, healthy sleep, regular physical activity, social activities, counseling, and medications.      Coping skills reviewed and encouraged positive framing of thoughts     Assisted patient in processing above session content; acknowledged and normalized patient’s thoughts, feelings, and concerns.  Applied  positive coping skills and behavior management in session.  Allowed patient to freely discuss issues without interruption or judgment. Provided safe, confidential environment to facilitate  the development of positive therapeutic relationship and encourage open, honest communication. Assisted patient in identifying risk factors which would indicate the need for higher level of care including thoughts to harm self or others and/or self-harming behavior and encouraged patient to contact this office, call 911, or present to the nearest emergency room should any of these events occur. Discussed crisis intervention services and means to access.     MEDS ORDERED DURING VISIT:  No orders of the defined types were placed in this encounter.    90-day supply sent in last visit      Follow Up   Return in about 8 weeks (around 9/15/2022), or if symptoms worsen or fail to improve, for Recheck.    Patient was given instructions and counseling regarding her condition or for health maintenance advice. Please see specific information pulled into the AVS if appropriate.     Some of the data in this electronic note has been brought forward from a previous encounter, any necessary changes have been made, it has been reviewed by this APRN, and it is accurate.      This document has been electronically signed by SHARATH Cornell  July 21, 2022 08:49 EDT    Part of this note may be an electronic transcription/translation of spoken language to printed text using the Dragon Dictation System.

## 2022-09-16 ENCOUNTER — TELEMEDICINE (OUTPATIENT)
Dept: PSYCHIATRY | Facility: CLINIC | Age: 32
End: 2022-09-16

## 2022-09-16 DIAGNOSIS — F41.1 GENERALIZED ANXIETY DISORDER: Primary | ICD-10-CM

## 2022-09-16 PROCEDURE — 90832 PSYTX W PT 30 MINUTES: CPT | Performed by: COUNSELOR

## 2022-09-16 NOTE — PROGRESS NOTES
Date: September 16, 2022  Time In: 0828  Time Out: 0842  This provider is located at the [facility] (through [facility]), [address], [place] [address] using a secure [employer] Video Visit through [email or web address]. Patient is being seen remotely via telehealth at home address in [place] and stated they are in a secure environment for this session. The patient's condition being diagnosed/treated is appropriate for telemedicine. The provider identified herself as well as her credentials. The patient, and/or patients guardian, consent to be seen remotely, and when consent is given they understand that the consent allows for patient identifiable information to be sent to a third party as needed. They may refuse to be seen remotely at any time. The electronic data is encrypted and password protected, and the patient and/or guardian has been advised of the potential risks to privacy not withstanding such measures.     You have chosen to receive care through a telehealth visit.  Do you consent to use a video/audio connection for your medical care today? Yes    PROGRESS NOTE  Data:  Hoa Lewis is a 32 y.o. female who presents today for follow up    Chief Complaint: anxiety, depression    History of Present Illness: Pt reports that she has been doing good these past few weeks. Pt reports that she continues to address her health and try to make better choices. Pt reports completing an allergy test recently which has helped with reducing itchiness that she was experiencing. Pt reports that she is making more of an effort with identifying negative thoughts and once identified will do a wall push up. Pt reports that she has also reduced soda intake by drinking flavored water. Pt reports that she has been supported by friends and family which has also been a positive experience.       Clinical Maneuvering/Intervention:    (Scales based on 0 - 10 with 10 being the worst)  Depression:  2 Anxiety: 2       Assisted patient in processing above session content; acknowledged and normalized patient’s thoughts, feelings, and concerns.  Rationalized patient thought process regarding making lifestyle changes.  Discussed triggers associated with patient's mood.  Also discussed coping skills for patient to implement such as continuing with being proactive regarding mental, physical, and emotional health.    Allowed patient to freely discuss issues without interruption or judgment. Provided safe, confidential environment to facilitate the development of positive therapeutic relationship and encourage open, honest communication. Assisted patient in identifying risk factors which would indicate the need for higher level of care including thoughts to harm self or others and/or self-harming behavior and encouraged patient to contact this office, call 911, or present to the nearest emergency room should any of these events occur. Discussed crisis intervention services and means to access. Patient adamantly and convincingly denies current suicidal or homicidal ideation or perceptual disturbance.    Assessment:   Assessment   Patient appears to maintain relative stability as compared to their baseline.  However, patient continues to struggle with anxiety which continues to cause impairment in important areas of functioning.  A result, they can be reasonably expected to continue to benefit from treatment and would likely be at increased risk for decompensation otherwise.    Mental Status Exam:   Hygiene:   good  Cooperation:  Cooperative  Eye Contact:  Good  Psychomotor Behavior:  Appropriate  Affect:  Appropriate  Mood: normal  Speech:  Normal  Thought Process:  Linear  Thought Content:  Normal  Suicidal:  None  Homicidal:  None  Hallucinations:  None  Delusion:  None  Memory:  Intact  Orientation:  Person, Place, Time and Situation  Reliability:  fair  Insight:  Fair  Judgement:  Fair  Impulse Control:   Fair  Physical/Medical Issues:  No        Patient's Support Network Includes:  parents    Functional Status: Mild impairment     Progress toward goal: Not at goal    Prognosis: Fair with Ongoing Treatment          Plan:    Patient will continue in individual outpatient therapy with focus on improved functioning and coping skills, maintaining stability, and avoiding decompensation and the need for higher level of care.    Patient will adhere to medication regimen as prescribed and report any side effects. Patient will contact this office, call 911 or present to the nearest emergency room should suicidal or homicidal ideations occur. Provide Cognitive Behavioral Therapy and Solution Focused Therapy to improve functioning, maintain stability, and avoid decompensation and the need for higher level of care.     Return in about 2 months, or earlier if symptoms worsen or fail to improve.           VISIT DIAGNOSIS:     ICD-10-CM ICD-9-CM   1. Generalized anxiety disorder  F41.1 300.02          [facility] No Show Policy:  We understand unexpected circumstances arise; however, anytime you miss your appointment we are unable to provide you appropriate care.  In addition, each appointment missed could have been used to provide care for others.  We ask that you call at least 24 hours in advance to cancel or reschedule an appointment.  We would like to take this opportunity to remind you of our policy stating patients who miss THREE or more appointments without cancelling or rescheduling 24 hours in advance of the appointment may be subject to cancellation of any further visits with our clinic and recommendation to seek in-person services/visits.    Please call [phone] to reschedule your appointment. If there are reasons that make it difficult for you to keep the appointments, please call and let us know how we can help.  Please understand that medication prescribing will not continue without seeing  your provider.      [facility]'s No Show Policy reviewed with patient at today's visit. Patient verbalized understanding of this policy. Discussed with patient that in the event that there are three or more no show visits, it will be recommended that they pursue in-person services/visits as noncompliance with telehealth visits indicates that patient is not an appropriate candidate for telemedicine and would likely be more appropriate for in-person services/visits. Patient verbalizes understanding and is agreeable to this.        This document has been electronically signed by Hoa Bettencourt LCSW  September 16, 2022 08:58 EDT      Part of this note may be an electronic transcription/translation of spoken language to printed text using the Dragon Dictation System.

## 2022-09-20 ENCOUNTER — TELEMEDICINE (OUTPATIENT)
Dept: PSYCHIATRY | Facility: CLINIC | Age: 32
End: 2022-09-20

## 2022-09-20 DIAGNOSIS — F41.1 GENERALIZED ANXIETY DISORDER: ICD-10-CM

## 2022-09-20 DIAGNOSIS — F33.1 MODERATE EPISODE OF RECURRENT MAJOR DEPRESSIVE DISORDER: Primary | ICD-10-CM

## 2022-09-20 PROCEDURE — 99214 OFFICE O/P EST MOD 30 MIN: CPT | Performed by: NURSE PRACTITIONER

## 2022-09-20 RX ORDER — SERTRALINE HYDROCHLORIDE 100 MG/1
100 TABLET, FILM COATED ORAL DAILY
Qty: 90 TABLET | Refills: 0 | Status: SHIPPED | OUTPATIENT
Start: 2022-09-20 | End: 2022-11-01 | Stop reason: SDUPTHER

## 2022-09-20 NOTE — PROGRESS NOTES
This provider is located at [facility], [address] [place] [address].The Patient is seen remotely at home,  [place] [address] via [facility].  Patient is being seen via telehealth and confirm that they are in a secure environment for this session. The patient's condition being diagnosed/treated is appropriate for telemedicine. The provider identified himself/herself: herself as well as her credentials.   The patient gave consent to be seen remotely, and when consent is given they understand that the consent allows for patient identifiable information to be sent to a third party as needed.   They may refuse to be seen remotely at any time. The electronic data is encrypted and password protected, and the patient has been advised of the potential risks to privacy not withstanding such measures.    You have chosen to receive care through a telehealth visit.  Do you consent to use a video/audio connection for your medical care today? Yes. Patient verified Name, , and address.       Chief Complaint  Anxiety and depression     Subjective    Hoa Abril Lewis presents to [facility] medication management.     History of Present Illness   Patient presents today reporting that things have been going okay.  She states that the depression is still present but is manageable however she feels she is doing a plateau with the medications.  She states at times she may get depressed or sad for no reason and noticed that that is coming more frequently.  Patient states the depression is not as constant as it was before but she does not want it to worsen as she is noticing increased fatigue.  Denies any significant hopeless or helplessness.  Patient states that her anxiety has been manageable as she notes work stressors can increase at times but otherwise doing well.  She reports her appetite is normal.  Reports that she is sleeping well.   Denies any side effects to the medications.  Denies any SI/HI/AH/VH.      Objective   Vital Signs:   There were no vitals taken for this visit.  Due to the remote nature of this encounter (virtual encounter), vitals were unable to be obtained.  Height stated at 67.9 inches.  Weight stated at 295 pounds.        PHQ-9 Score:   PHQ-9 Total Score:       Patient screened positive for depression based on a PHQ-9 score of  on . Follow-up recommendations include: see notes and medication list.    PHQ-9 Depression Screening  Little interest or pleasure in doing things?     Feeling down, depressed, or hopeless?     Trouble falling or staying asleep, or sleeping too much?     Feeling tired or having little energy?     Poor appetite or overeating?     Feeling bad about yourself - or that you are a failure or have let yourself or your family down?     Trouble concentrating on things, such as reading the newspaper or watching television?     Moving or speaking so slowly that other people could have noticed? Or the opposite - being so fidgety or restless that you have been moving around a lot more than usual?     Thoughts that you would be better off dead, or of hurting yourself in some way?     PHQ-9 Total Score     If you checked off any problems, how difficult have these problems made it for you to do your work, take care of things at home, or get along with other people?                  PROMIS scale screening tool that patient filled out virtually reviewed by this APRN at today's encounter.      Mental Status Exam:   Hygiene:   good  Cooperation:  Cooperative  Eye Contact:  Fair  Psychomotor Behavior:  Restless  Affect:  Appropriate  Mood: normal  Speech:  Normal  Thought Process:  Goal directed  Thought Content:  Normal  Suicidal:  None  Homicidal:  None  Hallucinations:  None  Delusion:  None  Memory:  Intact  Orientation:  Person, Place, Time and Situation  Reliability:  good  Insight:  Good and Fair  Judgement:  Good and  Fair  Impulse Control:  Good and Fair  Physical/Medical Issues:  No      Current Medications:   Current Outpatient Medications   Medication Sig Dispense Refill   • sertraline (Zoloft) 100 MG tablet Take 1 tablet by mouth Daily for 90 days. 90 tablet 0   • buPROPion XL (WELLBUTRIN XL) 150 MG 24 hr tablet Take 1 tablet by mouth Every Morning. 90 tablet 1   • Cholecalciferol (Vitamin D3) 1.25 MG (11797 UT) capsule TAKE 1 CAPSULE BY MOUTH EVERY 7 DAYS FOR 8 DOSES 8 capsule 0   • Tri-Lo-Sprintec 0.18/0.215/0.25 MG-25 MCG per tablet TAKE 1 TABLET BY MOUTH DAILY 28 tablet 2   • triamcinolone (KENALOG) 0.5 % ointment Apply  topically to the appropriate area as directed 2 (Two) Times a Day. 30 g 5   • vitamin B-12 (CYANOCOBALAMIN) 1000 MCG tablet Take 1 tablet by mouth Daily. 90 tablet 1     No current facility-administered medications for this visit.       Physical Exam  Nursing note reviewed.   Constitutional:       Appearance: Normal appearance.   Neurological:      Mental Status: She is alert.   Psychiatric:         Attention and Perception: Attention and perception normal.         Mood and Affect: Affect normal. Mood is depressed. Mood is not anxious.         Speech: Speech normal.         Behavior: Behavior is cooperative.         Thought Content: Thought content normal.         Cognition and Memory: Cognition and memory normal.         Judgment: Judgment normal.        Result Review :     The following data was reviewed by: SHARATH Cornell on 03/17/2022:        CBC w/diff    CBC w/Diff 8/27/21 9/7/21   WBC 8.50 7.15   RBC 4.09 4.41   Hemoglobin 10.8 (A) 11.6 (A)   Hematocrit 34.6 35.9   MCV 84.6 81.4   MCH 26.4 (A) 26.3 (A)   MCHC 31.2 (A) 32.3   RDW 13.9 13.5   Platelets 332 359   Neutrophil Rel % 58.8 54.7   Immature Granulocyte Rel % 0.7 (A) 0.6 (A)   Lymphocyte Rel % 32.1 35.2   Monocyte Rel % 5.8 6.7   Eosinophil Rel % 1.8 1.8   Basophil Rel % 0.8 1.0   (A) Abnormal value                            Data  reviewed: PCP notes        Assessment and Plan    Problem List Items Addressed This Visit        Mental Health    Moderate episode of recurrent major depressive disorder (HCC) - Primary    Relevant Medications    sertraline (Zoloft) 100 MG tablet      Other Visit Diagnoses     Generalized anxiety disorder        Relevant Medications    sertraline (Zoloft) 100 MG tablet            TREATMENT PLAN/GOALS: Continue supportive psychotherapy efforts and medications as indicated. Treatment and medication options discussed during today's visit. Patient ackowledged and verbally consented to continue with current treatment plan and was educated on the importance of compliance with treatment and follow-up appointments.    MEDICATION ISSUES:  We discussed risks, benefits, and side effects of the above medications and the patient was agreeable with the plan. Patient was educated on the importance of compliance with treatment and follow-up appointments.  Patient is agreeable to call the office with any worsening of symptoms or onset of side effects. Patient is agreeable to call 911 or go to the nearest ER should he/she begin having SI/HI. Patient was strongly encouraged to continue birth control.  Patient was counseled regarding need not to become pregnant prior to discussion and possible titration and discontinuation of medications.  An explanation was provided to the patient regarding the risk of fetal harm with psychotrophic medications.  Patient was provided education regarding both risk of continuing and discontinuing medications during pregnancy.  Patient verbalized understanding.        -Continue Wellbutrin 150 mg XL daily as adjunct for depression.  -Increase sertraline to 100 mg for depressive and anxiety symptoms.  Highly encouraged patient if she had any side effects or worsening symptoms to contact the clinic for sooner appointment she verbalized understanding.  -Continue psychotherapy with Hoa Bettencourt.      Counseled  patient regarding multimodal approach with healthy nutrition, healthy sleep, regular physical activity, social activities, counseling, and medications.      Coping skills reviewed and encouraged positive framing of thoughts     Assisted patient in processing above session content; acknowledged and normalized patient’s thoughts, feelings, and concerns.  Applied  positive coping skills and behavior management in session.  Allowed patient to freely discuss issues without interruption or judgment. Provided safe, confidential environment to facilitate the development of positive therapeutic relationship and encourage open, honest communication. Assisted patient in identifying risk factors which would indicate the need for higher level of care including thoughts to harm self or others and/or self-harming behavior and encouraged patient to contact this office, call 911, or present to the nearest emergency room should any of these events occur. Discussed crisis intervention services and means to access.     MEDS ORDERED DURING VISIT:  New Medications Ordered This Visit   Medications   • sertraline (Zoloft) 100 MG tablet     Sig: Take 1 tablet by mouth Daily for 90 days.     Dispense:  90 tablet     Refill:  0           Follow Up   Return in about 6 weeks (around 11/1/2022), or if symptoms worsen or fail to improve, for Recheck.  Encouraged patient if she had any worsening symptoms or concerns to contact the clinic for sooner appointment she verbalized understanding.    Patient was given instructions and counseling regarding her condition or for health maintenance advice. Please see specific information pulled into the AVS if appropriate.     Some of the data in this electronic note has been brought forward from a previous encounter, any necessary changes have been made, it has been reviewed by this APRN, and it is accurate.      This document has been electronically signed by SHARATH Cornell  September 20, 2022 09:48  EDT    Part of this note may be an electronic transcription/translation of spoken language to printed text using the Dragon Dictation System.

## 2022-09-29 ENCOUNTER — LAB (OUTPATIENT)
Dept: LAB | Facility: HOSPITAL | Age: 32
End: 2022-09-29

## 2022-09-29 ENCOUNTER — OFFICE VISIT (OUTPATIENT)
Dept: INTERNAL MEDICINE | Facility: CLINIC | Age: 32
End: 2022-09-29

## 2022-09-29 VITALS
SYSTOLIC BLOOD PRESSURE: 116 MMHG | HEART RATE: 85 BPM | WEIGHT: 265 LBS | OXYGEN SATURATION: 99 % | BODY MASS INDEX: 40.16 KG/M2 | RESPIRATION RATE: 20 BRPM | HEIGHT: 68 IN | TEMPERATURE: 97 F | DIASTOLIC BLOOD PRESSURE: 70 MMHG

## 2022-09-29 DIAGNOSIS — F41.9 ANXIETY: ICD-10-CM

## 2022-09-29 DIAGNOSIS — Z01.419 ROUTINE GYNECOLOGICAL EXAMINATION: ICD-10-CM

## 2022-09-29 DIAGNOSIS — E55.9 VITAMIN D DEFICIENCY: ICD-10-CM

## 2022-09-29 DIAGNOSIS — Z00.00 ROUTINE GENERAL MEDICAL EXAMINATION AT A HEALTH CARE FACILITY: Primary | ICD-10-CM

## 2022-09-29 DIAGNOSIS — Z23 NEED FOR IMMUNIZATION AGAINST INFLUENZA: ICD-10-CM

## 2022-09-29 DIAGNOSIS — F33.1 MODERATE EPISODE OF RECURRENT MAJOR DEPRESSIVE DISORDER: ICD-10-CM

## 2022-09-29 DIAGNOSIS — Z00.00 ROUTINE GENERAL MEDICAL EXAMINATION AT A HEALTH CARE FACILITY: ICD-10-CM

## 2022-09-29 DIAGNOSIS — Z23 NEED FOR TDAP VACCINATION: ICD-10-CM

## 2022-09-29 PROCEDURE — 90686 IIV4 VACC NO PRSV 0.5 ML IM: CPT | Performed by: NURSE PRACTITIONER

## 2022-09-29 PROCEDURE — 90471 IMMUNIZATION ADMIN: CPT | Performed by: NURSE PRACTITIONER

## 2022-09-29 PROCEDURE — 3008F BODY MASS INDEX DOCD: CPT | Performed by: NURSE PRACTITIONER

## 2022-09-29 PROCEDURE — 99395 PREV VISIT EST AGE 18-39: CPT | Performed by: NURSE PRACTITIONER

## 2022-09-29 PROCEDURE — 2014F MENTAL STATUS ASSESS: CPT | Performed by: NURSE PRACTITIONER

## 2022-09-29 PROCEDURE — 90715 TDAP VACCINE 7 YRS/> IM: CPT | Performed by: NURSE PRACTITIONER

## 2022-09-29 PROCEDURE — 90472 IMMUNIZATION ADMIN EACH ADD: CPT | Performed by: NURSE PRACTITIONER

## 2022-09-29 NOTE — PROGRESS NOTES
Subjective   Hoa Lewis is a 32 y.o. female    Chief Complaint   Patient presents with   • Annual Exam     Pap today   • Flu Vaccine     History of Present Illness     Here for annual    Anxiety and depression - managed by Psych; sx's well controlled    Pap - 2018, updating today  Tdap - updating today  Flu shot - updating today  Hep A - unknown  Hep C screening - 12/16/2022  LMP - 9/6/2022; regular/light on OCP     Diet - could use work     Exercise - none    Social History     Tobacco Use   • Smoking status: Never Smoker   • Smokeless tobacco: Never Used   Vaping Use   • Vaping Use: Never used   Substance Use Topics   • Alcohol use: Yes     Comment: rare occasion   • Drug use: No         The following portions of the patient's history were reviewed and updated as appropriate: allergies, current medications, past family history, past medical history, past social history, past surgical history and problem list.    Current Outpatient Medications:   •  buPROPion XL (WELLBUTRIN XL) 150 MG 24 hr tablet, Take 1 tablet by mouth Every Morning., Disp: 90 tablet, Rfl: 1  •  sertraline (Zoloft) 100 MG tablet, Take 1 tablet by mouth Daily for 90 days., Disp: 90 tablet, Rfl: 0  •  Tri-Lo-Sprintec 0.18/0.215/0.25 MG-25 MCG per tablet, TAKE 1 TABLET BY MOUTH DAILY, Disp: 28 tablet, Rfl: 2     Review of Systems    Objective   Physical Exam  Vitals reviewed. Exam conducted with a chaperone present.   Constitutional:       Appearance: Normal appearance. She is well-developed. She is obese.   HENT:      Head: Normocephalic and atraumatic.      Right Ear: External ear normal.      Left Ear: External ear normal.      Nose: Nose normal.      Mouth/Throat:      Mouth: Mucous membranes are moist.      Pharynx: Oropharynx is clear.   Eyes:      Conjunctiva/sclera: Conjunctivae normal.      Pupils: Pupils are equal, round, and reactive to light.   Cardiovascular:      Rate and Rhythm: Normal rate and regular rhythm.      Pulses:  "Normal pulses.           Carotid pulses are 2+ on the right side and 2+ on the left side.     Heart sounds: Normal heart sounds.   Pulmonary:      Effort: Pulmonary effort is normal.      Breath sounds: Normal breath sounds.   Chest:   Breasts: Breasts are symmetrical.      Right: No inverted nipple, mass, nipple discharge, skin change or tenderness.      Left: No inverted nipple, mass, nipple discharge, skin change or tenderness.       Abdominal:      General: Bowel sounds are normal.      Palpations: Abdomen is soft.   Genitourinary:     General: Normal vulva.      Pubic Area: No rash.       Vagina: Normal. No vaginal discharge.      Cervix: Normal.      Uterus: Normal.       Adnexa: Right adnexa normal and left adnexa normal.      Rectum: Normal. Guaiac result negative.   Musculoskeletal:         General: Normal range of motion.      Cervical back: Normal range of motion and neck supple.   Lymphadenopathy:      Head:      Right side of head: No tonsillar adenopathy.      Left side of head: No tonsillar adenopathy.      Cervical:      Right cervical: No superficial or posterior cervical adenopathy.     Left cervical: No superficial or posterior cervical adenopathy.   Skin:     General: Skin is warm and dry.      Capillary Refill: Capillary refill takes less than 2 seconds.   Neurological:      Mental Status: She is alert and oriented to person, place, and time.      Deep Tendon Reflexes: Reflexes are normal and symmetric.   Psychiatric:         Mood and Affect: Mood normal.         Behavior: Behavior normal.         Thought Content: Thought content normal.         Judgment: Judgment normal.       Vitals:    09/29/22 1302   BP: 116/70   Cuff Size: Large Adult   Pulse: 85   Resp: 20   Temp: 97 °F (36.1 °C)   TempSrc: Infrared   SpO2: 99%   Weight: 120 kg (265 lb)   Height: 172.7 cm (68\")     Body mass index is 40.29 kg/m².      Assessment & Plan   Diagnoses and all orders for this visit:    1. Routine general " medical examination at a health care facility (Primary)  -     CBC & Differential; Future  -     Comprehensive Metabolic Panel; Future  -     Lipid Panel; Future  -     TSH; Future  -     Vitamin B12; Future  -     Vitamin D 25 Hydroxy; Future    2. Anxiety  -     CBC & Differential; Future  -     Comprehensive Metabolic Panel; Future  -     Lipid Panel; Future  -     TSH; Future  -     Vitamin B12; Future  -     Vitamin D 25 Hydroxy; Future    3. Moderate episode of recurrent major depressive disorder (HCC)  -     CBC & Differential; Future  -     Comprehensive Metabolic Panel; Future  -     Lipid Panel; Future  -     TSH; Future  -     Vitamin B12; Future  -     Vitamin D 25 Hydroxy; Future    4. Vitamin D deficiency  -     CBC & Differential; Future  -     Comprehensive Metabolic Panel; Future  -     Lipid Panel; Future  -     TSH; Future  -     Vitamin B12; Future  -     Vitamin D 25 Hydroxy; Future    5. Need for immunization against influenza  -     FluLaval/Fluzone >6 mos ([id number])    6. Routine gynecological examination  -     LIQUID-BASED PAP SMEAR, [facility] ([facility]); Future    7. Need for Tdap vaccination  -     Tdap Vaccine Greater Than or Equal To 6yo IM      Labs sent  Pap updated  Breast exam done  Flu shot updated  Tdap updated  Counseling - diet and exercise  Patient's (Body mass index is 40.29 kg/m².) indicates that they are morbidly obese (BMI > 40 or > 35 with obesity - related health condition) with health related conditions that include none . Weight is unchanged. BMI is is above average; BMI management plan is completed. We discussed portion control and increasing exercise.   Return in about 1 year (around 9/29/2023) for Annual, collect labs today.

## 2022-09-30 LAB
25(OH)D3+25(OH)D2 SERPL-MCNC: 48.4 NG/ML (ref 30–100)
ALBUMIN SERPL-MCNC: 4.1 G/DL (ref 3.5–5.2)
ALBUMIN/GLOB SERPL: 1.7 G/DL
ALP SERPL-CCNC: 71 U/L (ref 39–117)
ALT SERPL-CCNC: 18 U/L (ref 1–33)
AST SERPL-CCNC: 16 U/L (ref 1–32)
BASOPHILS # BLD AUTO: 0.06 10*3/MM3 (ref 0–0.2)
BASOPHILS NFR BLD AUTO: 0.6 % (ref 0–1.5)
BILIRUB SERPL-MCNC: 0.6 MG/DL (ref 0–1.2)
BUN SERPL-MCNC: 11 MG/DL (ref 6–20)
BUN/CREAT SERPL: 19.3 (ref 7–25)
CALCIUM SERPL-MCNC: 8.9 MG/DL (ref 8.6–10.5)
CHLORIDE SERPL-SCNC: 100 MMOL/L (ref 98–107)
CHOLEST SERPL-MCNC: 239 MG/DL (ref 0–200)
CO2 SERPL-SCNC: 25.7 MMOL/L (ref 22–29)
CREAT SERPL-MCNC: 0.57 MG/DL (ref 0.57–1)
EGFRCR SERPLBLD CKD-EPI 2021: 124.8 ML/MIN/1.73
EOSINOPHIL # BLD AUTO: 0.06 10*3/MM3 (ref 0–0.4)
EOSINOPHIL NFR BLD AUTO: 0.6 % (ref 0.3–6.2)
ERYTHROCYTE [DISTWIDTH] IN BLOOD BY AUTOMATED COUNT: 13.2 % (ref 12.3–15.4)
GLOBULIN SER CALC-MCNC: 2.4 GM/DL
GLUCOSE SERPL-MCNC: 84 MG/DL (ref 65–99)
HCT VFR BLD AUTO: 34.8 % (ref 34–46.6)
HDLC SERPL-MCNC: 66 MG/DL (ref 40–60)
HGB BLD-MCNC: 11 G/DL (ref 12–15.9)
IMM GRANULOCYTES # BLD AUTO: 0.03 10*3/MM3 (ref 0–0.05)
IMM GRANULOCYTES NFR BLD AUTO: 0.3 % (ref 0–0.5)
LDLC SERPL CALC-MCNC: 129 MG/DL (ref 0–100)
LYMPHOCYTES # BLD AUTO: 3.2 10*3/MM3 (ref 0.7–3.1)
LYMPHOCYTES NFR BLD AUTO: 31.8 % (ref 19.6–45.3)
MCH RBC QN AUTO: 27 PG (ref 26.6–33)
MCHC RBC AUTO-ENTMCNC: 31.6 G/DL (ref 31.5–35.7)
MCV RBC AUTO: 85.3 FL (ref 79–97)
MONOCYTES # BLD AUTO: 0.57 10*3/MM3 (ref 0.1–0.9)
MONOCYTES NFR BLD AUTO: 5.7 % (ref 5–12)
NEUTROPHILS # BLD AUTO: 6.13 10*3/MM3 (ref 1.7–7)
NEUTROPHILS NFR BLD AUTO: 61 % (ref 42.7–76)
NRBC BLD AUTO-RTO: 0 /100 WBC (ref 0–0.2)
PLATELET # BLD AUTO: 330 10*3/MM3 (ref 140–450)
POTASSIUM SERPL-SCNC: 3.9 MMOL/L (ref 3.5–5.2)
PROT SERPL-MCNC: 6.5 G/DL (ref 6–8.5)
RBC # BLD AUTO: 4.08 10*6/MM3 (ref 3.77–5.28)
SODIUM SERPL-SCNC: 137 MMOL/L (ref 136–145)
TRIGL SERPL-MCNC: 249 MG/DL (ref 0–150)
TSH SERPL DL<=0.005 MIU/L-ACNC: 2.3 UIU/ML (ref 0.27–4.2)
VIT B12 SERPL-MCNC: 580 PG/ML (ref 211–946)
VLDLC SERPL CALC-MCNC: 44 MG/DL (ref 5–40)
WBC # BLD AUTO: 10.05 10*3/MM3 (ref 3.4–10.8)

## 2022-10-03 LAB — REF LAB TEST METHOD: NORMAL

## 2022-10-28 DIAGNOSIS — N92.0 MENORRHAGIA WITH REGULAR CYCLE: ICD-10-CM

## 2022-11-01 ENCOUNTER — TELEMEDICINE (OUTPATIENT)
Dept: PSYCHIATRY | Facility: CLINIC | Age: 32
End: 2022-11-01

## 2022-11-01 DIAGNOSIS — F33.0 MILD EPISODE OF RECURRENT MAJOR DEPRESSIVE DISORDER: Primary | ICD-10-CM

## 2022-11-01 DIAGNOSIS — F41.1 GENERALIZED ANXIETY DISORDER: ICD-10-CM

## 2022-11-01 PROCEDURE — 99214 OFFICE O/P EST MOD 30 MIN: CPT | Performed by: NURSE PRACTITIONER

## 2022-11-01 RX ORDER — SERTRALINE HYDROCHLORIDE 100 MG/1
100 TABLET, FILM COATED ORAL DAILY
Qty: 90 TABLET | Refills: 0 | Status: SHIPPED | OUTPATIENT
Start: 2022-11-01 | End: 2023-02-09

## 2022-11-01 RX ORDER — BUPROPION HYDROCHLORIDE 150 MG/1
150 TABLET ORAL EVERY MORNING
Qty: 90 TABLET | Refills: 1 | Status: SHIPPED | OUTPATIENT
Start: 2022-11-01 | End: 2023-02-27 | Stop reason: SDUPTHER

## 2022-11-01 RX ORDER — MULTIPLE VITAMINS W/ MINERALS TAB 9MG-400MCG
1 TAB ORAL DAILY
COMMUNITY

## 2022-11-01 NOTE — PROGRESS NOTES
This provider is located at [facility] [address] [place] [address].The Patient is seen remotely at home,  [place] [address] via [employer].  Patient is being seen via telehealth and confirm that they are in a secure environment for this session. The patient's condition being diagnosed/treated is appropriate for telemedicine. The provider identified himself/herself: herself as well as her credentials.   The patient gave consent to be seen remotely, and when consent is given they understand that the consent allows for patient identifiable information to be sent to a third party as needed.   They may refuse to be seen remotely at any time. The electronic data is encrypted and password protected, and the patient has been advised of the potential risks to privacy not withstanding such measures.    You have chosen to receive care through a telehealth visit.  Do you consent to use a video/audio connection for your medical care today? Yes. Patient verified Name, , and address.       Chief Complaint  Anxiety and depression     Subjective    Hoa Abril Lewis presents to [facility] medication management.     History of Present Illness   Patient presents today reporting that she has been doing good as she feels the increase in sertraline has helped overall with her anxiety.  Patient states that occasionally she may feel anxious but it is manageable and rates it a 1-2 as well as depression on a scale of 0-10 with 10 being the worst.  Patient however denies any major depressive symptoms or hopeless or helplessness.  She states that she may feel sadness at times but the thoughts do go away quickly.  Reports that she is sleeping and eating well and therapy is going well.  Denies any side effects to medications.  Denies any SI/HI/AH/VH.      Objective   Vital Signs:   There were no vitals taken for this visit.  Due to the remote  nature of this encounter (virtual encounter), vitals were unable to be obtained.  Height stated at 67.9 inches.  Weight stated at 265 pounds.        PHQ-9 Score:   PHQ-9 Total Score:       Patient screened positive for depression based on a PHQ-9 score of  on . Follow-up recommendations include: see notes and medication list.    PHQ-9 Depression Screening  Little interest or pleasure in doing things?     Feeling down, depressed, or hopeless?     Trouble falling or staying asleep, or sleeping too much?     Feeling tired or having little energy?     Poor appetite or overeating?     Feeling bad about yourself - or that you are a failure or have let yourself or your family down?     Trouble concentrating on things, such as reading the newspaper or watching television?     Moving or speaking so slowly that other people could have noticed? Or the opposite - being so fidgety or restless that you have been moving around a lot more than usual?     Thoughts that you would be better off dead, or of hurting yourself in some way?     PHQ-9 Total Score     If you checked off any problems, how difficult have these problems made it for you to do your work, take care of things at home, or get along with other people?                  PROMIS scale screening tool that patient filled out virtually reviewed by this APRN at today's encounter.      Mental Status Exam:   Hygiene:   good  Cooperation:  Cooperative  Eye Contact:  Fair  Psychomotor Behavior:  Restless  Affect:  Appropriate  Mood: normal  Speech:  Normal  Thought Process:  Goal directed  Thought Content:  Normal  Suicidal:  None  Homicidal:  None  Hallucinations:  None  Delusion:  None  Memory:  Intact  Orientation:  Person, Place, Time and Situation  Reliability:  good  Insight:  Good and Fair  Judgement:  Good and Fair  Impulse Control:  Good and Fair  Physical/Medical Issues:  No      Current Medications:   Current Outpatient Medications   Medication Sig Dispense Refill   •  buPROPion XL (WELLBUTRIN XL) 150 MG 24 hr tablet Take 1 tablet by mouth Every Morning. 90 tablet 1   • multivitamin with minerals tablet tablet Take 1 tablet by mouth Daily.     • sertraline (Zoloft) 100 MG tablet Take 1 tablet by mouth Daily for 90 days. 90 tablet 0   • Tri-Lo-Sprintec 0.18/0.215/0.25 MG-25 MCG per tablet TAKE 1 TABLET BY MOUTH DAILY 28 tablet 2     No current facility-administered medications for this visit.       Physical Exam  Nursing note reviewed.   Constitutional:       Appearance: Normal appearance.   Neurological:      Mental Status: She is alert.   Psychiatric:         Attention and Perception: Attention and perception normal.         Mood and Affect: Mood and affect normal. Mood is not anxious or depressed.         Speech: Speech normal.         Behavior: Behavior is cooperative.         Thought Content: Thought content normal.         Cognition and Memory: Cognition and memory normal.         Judgment: Judgment normal.        Result Review :     The following data was reviewed by: SHARATH Cornell on 03/17/2022:  Common labs    Common Labs 9/29/22 9/29/22 9/29/22    1421 1421 1421   Glucose  84    BUN  11    Creatinine  0.57    Sodium  137    Potassium  3.9    Chloride  100    Calcium  8.9    Total Protein  6.5    Albumin  4.10    Total Bilirubin  0.6    Alkaline Phosphatase  71    AST (SGOT)  16    ALT (SGPT)  18    WBC   10.05   Hemoglobin   11.0 (A)   Hematocrit   34.8   Platelets   330   Total Cholesterol 239 (A)     Triglycerides 249 (A)     HDL Cholesterol 66 (A)     LDL Cholesterol  129 (A)     (A) Abnormal value       Comments are available for some flowsheets but are not being displayed.           CMP    CMP 9/29/22   Glucose 84   BUN 11   Creatinine 0.57   Sodium 137   Potassium 3.9   Chloride 100   Calcium 8.9   Total Protein 6.5   Albumin 4.10   Globulin 2.4   Total Bilirubin 0.6   Alkaline Phosphatase 71   AST (SGOT) 16   ALT (SGPT) 18           CBC    CBC 9/29/22    WBC 10.05   RBC 4.08   Hemoglobin 11.0 (A)   Hematocrit 34.8   MCV 85.3   MCH 27.0   MCHC 31.6   RDW 13.2   Platelets 330   (A) Abnormal value            CBC w/diff    CBC w/Diff 8/27/21 9/7/21   WBC 8.50 7.15   RBC 4.09 4.41   Hemoglobin 10.8 (A) 11.6 (A)   Hematocrit 34.6 35.9   MCV 84.6 81.4   MCH 26.4 (A) 26.3 (A)   MCHC 31.2 (A) 32.3   RDW 13.9 13.5   Platelets 332 359   Neutrophil Rel % 58.8 54.7   Immature Granulocyte Rel % 0.7 (A) 0.6 (A)   Lymphocyte Rel % 32.1 35.2   Monocyte Rel % 5.8 6.7   Eosinophil Rel % 1.8 1.8   Basophil Rel % 0.8 1.0   (A) Abnormal value            Lipid Panel    Lipid Panel 9/29/22   Total Cholesterol 239 (A)   Triglycerides 249 (A)   HDL Cholesterol 66 (A)   VLDL Cholesterol 44 (A)   LDL Cholesterol  129 (A)   (A) Abnormal value       Comments are available for some flowsheets but are not being displayed.           TSH    TSH 9/29/22   TSH 2.300           Electrolytes    Electrolytes 9/29/22   Sodium 137   Potassium 3.9   Chloride 100   Calcium 8.9           Renal Profile    Renal Profile 9/29/22   BUN 11   Creatinine 0.57                   Data reviewed: PCP notes        Assessment and Plan    Problem List Items Addressed This Visit        Mental Health    Moderate episode of recurrent major depressive disorder (HCC) - Primary    Relevant Medications    sertraline (Zoloft) 100 MG tablet    buPROPion XL (WELLBUTRIN XL) 150 MG 24 hr tablet   Other Visit Diagnoses     Generalized anxiety disorder        Relevant Medications    sertraline (Zoloft) 100 MG tablet    buPROPion XL (WELLBUTRIN XL) 150 MG 24 hr tablet            TREATMENT PLAN/GOALS: Continue supportive psychotherapy efforts and medications as indicated. Treatment and medication options discussed during today's visit. Patient ackowledged and verbally consented to continue with current treatment plan and was educated on the importance of compliance with treatment and follow-up appointments.    MEDICATION ISSUES:  We  discussed risks, benefits, and side effects of the above medications and the patient was agreeable with the plan. Patient was educated on the importance of compliance with treatment and follow-up appointments.  Patient is agreeable to call the office with any worsening of symptoms or onset of side effects. Patient is agreeable to call 911 or go to the nearest ER should he/she begin having SI/HI. Patient was strongly encouraged to continue birth control.  Patient was counseled regarding need not to become pregnant prior to discussion and possible titration and discontinuation of medications.  An explanation was provided to the patient regarding the risk of fetal harm with psychotrophic medications.  Patient was provided education regarding both risk of continuing and discontinuing medications during pregnancy.  Patient verbalized understanding.        -Continue Wellbutrin 150 mg XL daily as adjunct for depression.  -Continue sertraline 100 mg for depressive and anxiety symptoms.  Highly encouraged patient if she had any side effects or worsening symptoms to contact the clinic for sooner appointment she verbalized understanding.  -Continue psychotherapy with Hoa Bettencourt.      Counseled patient regarding multimodal approach with healthy nutrition, healthy sleep, regular physical activity, social activities, counseling, and medications.      Coping skills reviewed and encouraged positive framing of thoughts     Assisted patient in processing above session content; acknowledged and normalized patient’s thoughts, feelings, and concerns.  Applied  positive coping skills and behavior management in session.  Allowed patient to freely discuss issues without interruption or judgment. Provided safe, confidential environment to facilitate the development of positive therapeutic relationship and encourage open, honest communication. Assisted patient in identifying risk factors which would indicate the need for higher level of  care including thoughts to harm self or others and/or self-harming behavior and encouraged patient to contact this office, call 911, or present to the nearest emergency room should any of these events occur. Discussed crisis intervention services and means to access.     MEDS ORDERED DURING VISIT:  New Medications Ordered This Visit   Medications   • sertraline (Zoloft) 100 MG tablet     Sig: Take 1 tablet by mouth Daily for 90 days.     Dispense:  90 tablet     Refill:  0   • buPROPion XL (WELLBUTRIN XL) 150 MG 24 hr tablet     Sig: Take 1 tablet by mouth Every Morning.     Dispense:  90 tablet     Refill:  1         Follow Up   Return in about 8 weeks (around 12/27/2022), or if symptoms worsen or fail to improve, for Recheck.  Encouraged patient if she had any worsening symptoms or concerns to contact the clinic for sooner appointment she verbalized understanding.    Patient was given instructions and counseling regarding her condition or for health maintenance advice. Please see specific information pulled into the AVS if appropriate.     Some of the data in this electronic note has been brought forward from a previous encounter, any necessary changes have been made, it has been reviewed by this APRN, and it is accurate.      This document has been electronically signed by SHARATH Cornell  November 1, 2022 09:54 EDT    Part of this note may be an electronic transcription/translation of spoken language to printed text using the Dragon Dictation System.

## 2022-11-03 RX ORDER — NORGESTIMATE AND ETHINYL ESTRADIOL
KIT
Qty: 28 TABLET | Refills: 2 | Status: SHIPPED | OUTPATIENT
Start: 2022-11-03 | End: 2023-01-31

## 2022-11-30 ENCOUNTER — TELEMEDICINE (OUTPATIENT)
Dept: PSYCHIATRY | Facility: CLINIC | Age: 32
End: 2022-11-30

## 2022-11-30 DIAGNOSIS — F41.1 GENERALIZED ANXIETY DISORDER: Primary | ICD-10-CM

## 2022-11-30 PROCEDURE — 90832 PSYTX W PT 30 MINUTES: CPT | Performed by: COUNSELOR

## 2022-11-30 NOTE — PROGRESS NOTES
Date: November 30, 2022  Time In: 0920  Time Out: 0937  This provider is located at the [facility] (through [facility]), [address], [place] [address] using a secure [employer] Video Visit through [employer]. Patient is being seen remotely via telehealth at home address in [place] and stated they are in a secure environment for this session. The patient's condition being diagnosed/treated is appropriate for telemedicine. The provider identified herself as well as her credentials. The patient, and/or patients guardian, consent to be seen remotely, and when consent is given they understand that the consent allows for patient identifiable information to be sent to a third party as needed. They may refuse to be seen remotely at any time. The electronic data is encrypted and password protected, and the patient and/or guardian has been advised of the potential risks to privacy not withstanding such measures.     You have chosen to receive care through a telehealth visit.  Do you consent to use a video/audio connection for your medical care today? Yes    PROGRESS NOTE  Data:  Hoa Lewis is a 32 y.o. female who presents today for follow up    Chief Complaint: anxiety     History of Present Illness: Pt reports that she had Covid during Thanksgiving and was unable to participate with her family's holiday plans as she had planned. Pt reports that anxiety has been manageable for the most part until here recently due to annual work evaluation soon approaching. Pt reports that she thinks about her work performance and things that she could have done differently or wonders if she is doing enough.        Clinical Maneuvering/Intervention:    (Scales based on 0 - 10 with 10 being the worst)  Depression: 3 Anxiety: 6     DERRICK-7      PHQ-9 Total Score:       Assisted patient in processing above session content; acknowledged and normalized patient’s thoughts, feelings, and concerns.   Rationalized patient thought process regarding work evaluation.  Discussed triggers associated with patient's anxiety.  Also discussed coping skills for patient to implement such as normalizing emotions and perspective of evaluation.    Allowed patient to freely discuss issues without interruption or judgment. Provided safe, confidential environment to facilitate the development of positive therapeutic relationship and encourage open, honest communication. Assisted patient in identifying risk factors which would indicate the need for higher level of care including thoughts to harm self or others and/or self-harming behavior and encouraged patient to contact this office, call 911, or present to the nearest emergency room should any of these events occur. Discussed crisis intervention services and means to access. Patient adamantly and convincingly denies current suicidal or homicidal ideation or perceptual disturbance.    Assessment:   Assessment   Patient appears to maintain relative stability as compared to their baseline.  However, patient continues to struggle with anxiety which continues to cause impairment in important areas of functioning.  A result, they can be reasonably expected to continue to benefit from treatment and would likely be at increased risk for decompensation otherwise.    Mental Status Exam:   Hygiene:   good  Cooperation:  Cooperative  Eye Contact:  Good  Psychomotor Behavior:  Appropriate  Affect:  Full range  Mood: normal  Speech:  Normal  Thought Process:  Linear  Thought Content:  Normal  Suicidal:  None  Homicidal:  None  Hallucinations:  None  Delusion:  None  Memory:  Intact  Orientation:  Person, Place, Time and Situation  Reliability:  fair  Insight:  Fair  Judgement:  Fair  Impulse Control:  Fair  Physical/Medical Issues:  No      Patient's Support Network Includes:  parents    Functional Status: Mild impairment     Progress toward goal: Not at goal    Prognosis: Fair with Ongoing  Treatment     Plan:    Patient will continue in individual outpatient therapy with focus on improved functioning and coping skills, maintaining stability, and avoiding decompensation and the need for higher level of care.    Patient will adhere to medication regimen as prescribed and report any side effects. Patient will contact this office, call 911 or present to the nearest emergency room should suicidal or homicidal ideations occur. Provide Cognitive Behavioral Therapy and Solution Focused Therapy to improve functioning, maintain stability, and avoid decompensation and the need for higher level of care.     Return in about 8 weeks, or earlier if symptoms worsen or fail to improve.           VISIT DIAGNOSIS:     ICD-10-CM ICD-9-CM   1. Generalized anxiety disorder  F41.1 300.02        Diagnoses and all orders for this visit:    1. Generalized anxiety disorder (Primary)           [facility] No Show Policy:  We understand unexpected circumstances arise; however, anytime you miss your appointment we are unable to provide you appropriate care.  In addition, each appointment missed could have been used to provide care for others.  We ask that you call at least 24 hours in advance to cancel or reschedule an appointment.  We would like to take this opportunity to remind you of our policy stating patients who miss THREE or more appointments without cancelling or rescheduling 24 hours in advance of the appointment may be subject to cancellation of any further visits with our clinic and recommendation to seek in-person services/visits.    Please call [phone] to reschedule your appointment. If there are reasons that make it difficult for you to keep the appointments, please call and let us know how we can help.  Please understand that medication prescribing will not continue without seeing your provider.      [facility]'s No Show Policy reviewed with patient at today's  visit. Patient verbalized understanding of this policy. Discussed with patient that in the event that there are three or more no show visits, it will be recommended that they pursue in-person services/visits as noncompliance with telehealth visits indicates that patient is not an appropriate candidate for telemedicine and would likely be more appropriate for in-person services/visits. Patient verbalizes understanding and is agreeable to this.        This document has been electronically signed by Hoa Bettencourt LCSW  November 30, 2022 13:39 EST      Part of this note may be an electronic transcription/translation of spoken language to printed text using the Dragon Dictation System.

## 2022-12-19 ENCOUNTER — LAB (OUTPATIENT)
Dept: LAB | Facility: HOSPITAL | Age: 32
End: 2022-12-19

## 2022-12-19 ENCOUNTER — OFFICE VISIT (OUTPATIENT)
Dept: INTERNAL MEDICINE | Facility: CLINIC | Age: 32
End: 2022-12-19

## 2022-12-19 VITALS
RESPIRATION RATE: 20 BRPM | TEMPERATURE: 97 F | DIASTOLIC BLOOD PRESSURE: 82 MMHG | OXYGEN SATURATION: 98 % | HEIGHT: 68 IN | SYSTOLIC BLOOD PRESSURE: 122 MMHG | WEIGHT: 269 LBS | HEART RATE: 95 BPM | BODY MASS INDEX: 40.77 KG/M2

## 2022-12-19 DIAGNOSIS — Z23 NEED FOR COVID-19 VACCINE: ICD-10-CM

## 2022-12-19 DIAGNOSIS — F33.1 MODERATE EPISODE OF RECURRENT MAJOR DEPRESSIVE DISORDER: ICD-10-CM

## 2022-12-19 DIAGNOSIS — F41.9 ANXIETY: Primary | ICD-10-CM

## 2022-12-19 DIAGNOSIS — E66.01 CLASS 3 SEVERE OBESITY WITH SERIOUS COMORBIDITY AND BODY MASS INDEX (BMI) OF 40.0 TO 44.9 IN ADULT, UNSPECIFIED OBESITY TYPE: ICD-10-CM

## 2022-12-19 DIAGNOSIS — F41.9 ANXIETY: ICD-10-CM

## 2022-12-19 PROCEDURE — 0124A COVID-19 (PFIZER) BIVALENT BOOSTER 12+YRS: CPT | Performed by: NURSE PRACTITIONER

## 2022-12-19 PROCEDURE — 99214 OFFICE O/P EST MOD 30 MIN: CPT | Performed by: NURSE PRACTITIONER

## 2022-12-19 PROCEDURE — 91312 COVID-19 (PFIZER) BIVALENT BOOSTER 12+YRS: CPT | Performed by: NURSE PRACTITIONER

## 2022-12-19 NOTE — PROGRESS NOTES
Subjective   Hoa Lewis is a 32 y.o. female    Chief Complaint   Patient presents with   • Eating Disorder     Eating excessively, feels like it's stress and anxiety. Discuss medications for suppressing appetite   • covid vaccine     History of Present Illness     Anxiety/depression -chronic and stable on Wellbutrin  mg daily.  Patient states that her anxiety and depression is well controlled.  She wishes to continue this medication without change.    Obesity -patient states that she is having a very difficult time losing weight.  States that no matter what she does she is gaining weight.  States that she is always hungry.  States that she never feels full.  She is very interested in one of the injectable weight loss medications such as Wegovy or Saxenda    The following portions of the patient's history were reviewed and updated as appropriate: allergies, current medications, past family history, past medical history, past social history, past surgical history and problem list.    Current Outpatient Medications:   •  buPROPion XL (WELLBUTRIN XL) 150 MG 24 hr tablet, Take 1 tablet by mouth Every Morning., Disp: 90 tablet, Rfl: 1  •  multivitamin with minerals tablet tablet, Take 1 tablet by mouth Daily., Disp: , Rfl:   •  sertraline (Zoloft) 100 MG tablet, Take 1 tablet by mouth Daily for 90 days., Disp: 90 tablet, Rfl: 0  •  Tri-Lo-Sprintec 0.18/0.215/0.25 MG-25 MCG per tablet, TAKE 1 TABLET BY MOUTH DAILY, Disp: 28 tablet, Rfl: 2     Review of Systems   Constitutional: Negative for chills, fatigue and fever.   Respiratory: Negative for cough, chest tightness and shortness of breath.    Cardiovascular: Negative for chest pain.   Gastrointestinal: Negative for abdominal pain, diarrhea, nausea and vomiting.   Endocrine: Negative for cold intolerance and heat intolerance.   Musculoskeletal: Negative for arthralgias.   Neurological: Negative for dizziness.       Objective   Physical Exam  Constitutional:  "      Appearance: She is well-developed. She is obese.   HENT:      Head: Normocephalic and atraumatic.   Eyes:      Conjunctiva/sclera: Conjunctivae normal.      Pupils: Pupils are equal, round, and reactive to light.   Cardiovascular:      Rate and Rhythm: Normal rate and regular rhythm.      Heart sounds: Normal heart sounds.   Pulmonary:      Effort: Pulmonary effort is normal.      Breath sounds: Normal breath sounds.   Abdominal:      General: Bowel sounds are normal.      Palpations: Abdomen is soft.   Musculoskeletal:         General: Normal range of motion.      Cervical back: Normal range of motion.   Skin:     General: Skin is warm and dry.   Neurological:      Mental Status: She is alert and oriented to person, place, and time.      Deep Tendon Reflexes: Reflexes are normal and symmetric.   Psychiatric:         Behavior: Behavior normal.         Thought Content: Thought content normal.         Judgment: Judgment normal.       Vitals:    12/19/22 0927   BP: 122/82   Cuff Size: Large Adult   Pulse: 95   Resp: 20   Temp: 97 °F (36.1 °C)   TempSrc: Infrared   SpO2: 98%   Weight: 122 kg (269 lb)   Height: 172.7 cm (68\")     Body mass index is 40.9 kg/m².      Assessment & Plan   Diagnoses and all orders for this visit:    1. Anxiety (Primary)  -     CBC & Differential; Future  -     Comprehensive Metabolic Panel; Future  -     Lipid Panel; Future  -     TSH Rfx On Abnormal To Free T4; Future    2. Moderate episode of recurrent major depressive disorder (HCC)  -     CBC & Differential; Future  -     Comprehensive Metabolic Panel; Future  -     Lipid Panel; Future  -     TSH Rfx On Abnormal To Free T4; Future    3. Class 3 severe obesity with serious comorbidity and body mass index (BMI) of 40.0 to 44.9 in adult, unspecified obesity type (HCC)  -     CBC & Differential; Future  -     Comprehensive Metabolic Panel; Future  -     Lipid Panel; Future  -     TSH Rfx On Abnormal To Free T4; Future    4. Need for " COVID-19 vaccine  -     COVID-19 Bivalent Booster (Pfizer) 12+yrs      We will check labs.  If labs are within normal limits we can start Wegovy.  Continue Wellbutrin without change  COVID booster updated  Return in about 4 weeks (around 1/16/2023).

## 2022-12-20 LAB
ALBUMIN SERPL-MCNC: 4.3 G/DL (ref 3.5–5.2)
ALBUMIN/GLOB SERPL: 1.6 G/DL
ALP SERPL-CCNC: 82 U/L (ref 39–117)
ALT SERPL-CCNC: 14 U/L (ref 1–33)
AST SERPL-CCNC: 11 U/L (ref 1–32)
BASOPHILS # BLD AUTO: 0.08 10*3/MM3 (ref 0–0.2)
BASOPHILS NFR BLD AUTO: 0.7 % (ref 0–1.5)
BILIRUB SERPL-MCNC: 0.3 MG/DL (ref 0–1.2)
BUN SERPL-MCNC: 14 MG/DL (ref 6–20)
BUN/CREAT SERPL: 20.3 (ref 7–25)
CALCIUM SERPL-MCNC: 9.5 MG/DL (ref 8.6–10.5)
CHLORIDE SERPL-SCNC: 101 MMOL/L (ref 98–107)
CHOLEST SERPL-MCNC: 279 MG/DL (ref 0–200)
CO2 SERPL-SCNC: 26 MMOL/L (ref 22–29)
CREAT SERPL-MCNC: 0.69 MG/DL (ref 0.57–1)
EGFRCR SERPLBLD CKD-EPI 2021: 119.2 ML/MIN/1.73
EOSINOPHIL # BLD AUTO: 0.09 10*3/MM3 (ref 0–0.4)
EOSINOPHIL NFR BLD AUTO: 0.8 % (ref 0.3–6.2)
ERYTHROCYTE [DISTWIDTH] IN BLOOD BY AUTOMATED COUNT: 13.9 % (ref 12.3–15.4)
GLOBULIN SER CALC-MCNC: 2.7 GM/DL
GLUCOSE SERPL-MCNC: 93 MG/DL (ref 65–99)
HCT VFR BLD AUTO: 35.9 % (ref 34–46.6)
HDLC SERPL-MCNC: 58 MG/DL (ref 40–60)
HGB BLD-MCNC: 11.7 G/DL (ref 12–15.9)
IMM GRANULOCYTES # BLD AUTO: 0.08 10*3/MM3 (ref 0–0.05)
IMM GRANULOCYTES NFR BLD AUTO: 0.7 % (ref 0–0.5)
LDLC SERPL CALC-MCNC: 151 MG/DL (ref 0–100)
LYMPHOCYTES # BLD AUTO: 3.42 10*3/MM3 (ref 0.7–3.1)
LYMPHOCYTES NFR BLD AUTO: 30.5 % (ref 19.6–45.3)
MCH RBC QN AUTO: 26.9 PG (ref 26.6–33)
MCHC RBC AUTO-ENTMCNC: 32.6 G/DL (ref 31.5–35.7)
MCV RBC AUTO: 82.5 FL (ref 79–97)
MONOCYTES # BLD AUTO: 0.59 10*3/MM3 (ref 0.1–0.9)
MONOCYTES NFR BLD AUTO: 5.3 % (ref 5–12)
NEUTROPHILS # BLD AUTO: 6.96 10*3/MM3 (ref 1.7–7)
NEUTROPHILS NFR BLD AUTO: 62 % (ref 42.7–76)
NRBC BLD AUTO-RTO: 0 /100 WBC (ref 0–0.2)
PLATELET # BLD AUTO: 336 10*3/MM3 (ref 140–450)
POTASSIUM SERPL-SCNC: 4.4 MMOL/L (ref 3.5–5.2)
PROT SERPL-MCNC: 7 G/DL (ref 6–8.5)
RBC # BLD AUTO: 4.35 10*6/MM3 (ref 3.77–5.28)
SODIUM SERPL-SCNC: 137 MMOL/L (ref 136–145)
TRIGL SERPL-MCNC: 375 MG/DL (ref 0–150)
TSH SERPL DL<=0.005 MIU/L-ACNC: 3.49 UIU/ML (ref 0.27–4.2)
VLDLC SERPL CALC-MCNC: 70 MG/DL (ref 5–40)
WBC # BLD AUTO: 11.22 10*3/MM3 (ref 3.4–10.8)

## 2022-12-28 ENCOUNTER — TELEMEDICINE (OUTPATIENT)
Dept: PSYCHIATRY | Facility: CLINIC | Age: 32
End: 2022-12-28

## 2022-12-28 DIAGNOSIS — F33.0 MILD EPISODE OF RECURRENT MAJOR DEPRESSIVE DISORDER: Primary | ICD-10-CM

## 2022-12-28 DIAGNOSIS — F41.1 GENERALIZED ANXIETY DISORDER: ICD-10-CM

## 2022-12-28 PROCEDURE — 99214 OFFICE O/P EST MOD 30 MIN: CPT | Performed by: NURSE PRACTITIONER

## 2022-12-28 NOTE — PROGRESS NOTES
"This provider is located at [facility], [address] [place] [address].The Patient is seen remotely at home,  [place] [address] via [employer].  Patient is being seen via telehealth and confirm that they are in a secure environment for this session. The patient's condition being diagnosed/treated is appropriate for telemedicine. The provider identified himself/herself: herself as well as her credentials.   The patient gave consent to be seen remotely, and when consent is given they understand that the consent allows for patient identifiable information to be sent to a third party as needed.   They may refuse to be seen remotely at any time. The electronic data is encrypted and password protected, and the patient has been advised of the potential risks to privacy not withstanding such measures.    You have chosen to receive care through a telehealth visit.  Do you consent to use a video/audio connection for your medical care today? Yes. Patient verified Name, , and address.       Chief Complaint  Anxiety and depression     Subjective    Hoa Abril Lewis presents to [facility] medication management.     History of Present Illness   Patient presents today reporting that she has been doing \"pretty good\".  She notes that there has been nothing negative going on and she has not been too anxious or too depressed.  She states that she has been busy with work so she has had some tiredness but otherwise she is doing well and overall sleeping and eating well.  Patient denies any panic attacks or any new medical or medicine changes besides warts being removed.  Rates her depression a 3 anxiety of 1-2 on a scale of 0-10 with 10 being the worst.  Patient states when depression does happen it is only for a short period of time and does not last long.  Denies any side effects to medications.  Denies any SI/HI/AH/VH.      Objective "   Vital Signs:   There were no vitals taken for this visit.  Due to the remote nature of this encounter (virtual encounter), vitals were unable to be obtained.  Height stated at 67.9 inches.  Weight stated at 265 pounds.        PHQ-9 Score:   PHQ-9 Total Score:       Patient screened positive for depression based on a PHQ-9 score of  on . Follow-up recommendations include: see notes and medication list.    PHQ-9 Depression Screening  Little interest or pleasure in doing things?     Feeling down, depressed, or hopeless?     Trouble falling or staying asleep, or sleeping too much?     Feeling tired or having little energy?     Poor appetite or overeating?     Feeling bad about yourself - or that you are a failure or have let yourself or your family down?     Trouble concentrating on things, such as reading the newspaper or watching television?     Moving or speaking so slowly that other people could have noticed? Or the opposite - being so fidgety or restless that you have been moving around a lot more than usual?     Thoughts that you would be better off dead, or of hurting yourself in some way?     PHQ-9 Total Score     If you checked off any problems, how difficult have these problems made it for you to do your work, take care of things at home, or get along with other people?                  PROMIS scale screening tool that patient filled out virtually reviewed by this APRN at today's encounter.      Mental Status Exam:   Hygiene:   good  Cooperation:  Cooperative  Eye Contact:  Fair  Psychomotor Behavior:  Restless  Affect:  Appropriate  Mood: normal  Speech:  Normal  Thought Process:  Goal directed  Thought Content:  Normal  Suicidal:  None  Homicidal:  None  Hallucinations:  None  Delusion:  None  Memory:  Intact  Orientation:  Person, Place, Time and Situation  Reliability:  good  Insight:  Good and Fair  Judgement:  Good and Fair  Impulse Control:  Good and Fair  Physical/Medical Issues:  No      Current  Medications:   Current Outpatient Medications   Medication Sig Dispense Refill   • buPROPion XL (WELLBUTRIN XL) 150 MG 24 hr tablet Take 1 tablet by mouth Every Morning. 90 tablet 1   • multivitamin with minerals tablet tablet Take 1 tablet by mouth Daily.     • sertraline (Zoloft) 100 MG tablet Take 1 tablet by mouth Daily for 90 days. 90 tablet 0   • Tri-Lo-Sprintec 0.18/0.215/0.25 MG-25 MCG per tablet TAKE 1 TABLET BY MOUTH DAILY 28 tablet 2     No current facility-administered medications for this visit.       Physical Exam  Nursing note reviewed.   Constitutional:       Appearance: Normal appearance.   Neurological:      Mental Status: She is alert.   Psychiatric:         Attention and Perception: Attention and perception normal.         Mood and Affect: Mood and affect normal. Mood is not anxious or depressed.         Speech: Speech normal.         Behavior: Behavior is cooperative.         Thought Content: Thought content normal.         Cognition and Memory: Cognition and memory normal.         Judgment: Judgment normal.        Result Review :     The following data was reviewed by: SHARATH Cornell on 03/17/2022:  Common labs    Common Labs 9/29/22 9/29/22 9/29/22 12/19/22 12/19/22 12/19/22    1421 1421 1421 1044 1044 1044   Glucose  84   93    BUN  11   14    Creatinine  0.57   0.69    Sodium  137   137    Potassium  3.9   4.4    Chloride  100   101    Calcium  8.9   9.5    Total Protein  6.5   7.0    Albumin  4.10   4.30    Total Bilirubin  0.6   0.3    Alkaline Phosphatase  71   82    AST (SGOT)  16   11    ALT (SGPT)  18   14    WBC   10.05   11.22 (A)   Hemoglobin   11.0 (A)   11.7 (A)   Hematocrit   34.8   35.9   Platelets   330   336   Total Cholesterol 239 (A)   279 (A)     Triglycerides 249 (A)   375 (A)     HDL Cholesterol 66 (A)   58     LDL Cholesterol  129 (A)   151 (A)     (A) Abnormal value       Comments are available for some flowsheets but are not being displayed.           CMP     CMP 9/29/22 12/19/22   Glucose 84 93   BUN 11 14   Creatinine 0.57 0.69   Sodium 137 137   Potassium 3.9 4.4   Chloride 100 101   Calcium 8.9 9.5   Total Protein 6.5 7.0   Albumin 4.10 4.30   Globulin 2.4 2.7   Total Bilirubin 0.6 0.3   Alkaline Phosphatase 71 82   AST (SGOT) 16 11   ALT (SGPT) 18 14   BUN/Creatinine Ratio 19.3 20.3           CBC    CBC 9/29/22 12/19/22   WBC 10.05 11.22 (A)   RBC 4.08 4.35   Hemoglobin 11.0 (A) 11.7 (A)   Hematocrit 34.8 35.9   MCV 85.3 82.5   MCH 27.0 26.9   MCHC 31.6 32.6   RDW 13.2 13.9   Platelets 330 336   (A) Abnormal value            CBC w/diff    CBC w/Diff 8/27/21 9/7/21   WBC 8.50 7.15   RBC 4.09 4.41   Hemoglobin 10.8 (A) 11.6 (A)   Hematocrit 34.6 35.9   MCV 84.6 81.4   MCH 26.4 (A) 26.3 (A)   MCHC 31.2 (A) 32.3   RDW 13.9 13.5   Platelets 332 359   Neutrophil Rel % 58.8 54.7   Immature Granulocyte Rel % 0.7 (A) 0.6 (A)   Lymphocyte Rel % 32.1 35.2   Monocyte Rel % 5.8 6.7   Eosinophil Rel % 1.8 1.8   Basophil Rel % 0.8 1.0   (A) Abnormal value            Lipid Panel    Lipid Panel 9/29/22 12/19/22   Total Cholesterol 239 (A) 279 (A)   Triglycerides 249 (A) 375 (A)   HDL Cholesterol 66 (A) 58   VLDL Cholesterol 44 (A) 70 (A)   LDL Cholesterol  129 (A) 151 (A)   (A) Abnormal value       Comments are available for some flowsheets but are not being displayed.           TSH    TSH 9/29/22 12/19/22   TSH 2.300 3.490           Electrolytes    Electrolytes 9/29/22 12/19/22   Sodium 137 137   Potassium 3.9 4.4   Chloride 100 101   Calcium 8.9 9.5           Renal Profile    Renal Profile 9/29/22 12/19/22   BUN 11 14   Creatinine 0.57 0.69                   Data reviewed: PCP notes        Assessment and Plan    Problem List Items Addressed This Visit    None  Visit Diagnoses     Mild episode of recurrent major depressive disorder (HCC)    -  Primary    Generalized anxiety disorder                TREATMENT PLAN/GOALS: Continue supportive psychotherapy efforts and medications as  indicated. Treatment and medication options discussed during today's visit. Patient ackowledged and verbally consented to continue with current treatment plan and was educated on the importance of compliance with treatment and follow-up appointments.    MEDICATION ISSUES:  We discussed risks, benefits, and side effects of the above medications and the patient was agreeable with the plan. Patient was educated on the importance of compliance with treatment and follow-up appointments.  Patient is agreeable to call the office with any worsening of symptoms or onset of side effects. Patient is agreeable to call 911 or go to the nearest ER should he/she begin having SI/HI. Patient was strongly encouraged to continue birth control.  Patient was counseled regarding need not to become pregnant prior to discussion and possible titration and discontinuation of medications.  An explanation was provided to the patient regarding the risk of fetal harm with psychotrophic medications.  Patient was provided education regarding both risk of continuing and discontinuing medications during pregnancy.  Patient verbalized understanding.        -Continue Wellbutrin 150 mg XL daily as adjunct for depression.  -Continue sertraline 100 mg for depressive and anxiety symptoms.  Highly encouraged patient if she had any side effects or worsening symptoms to contact the clinic for sooner appointment she verbalized understanding.  -Continue psychotherapy with Hoa Bettencourt.      Counseled patient regarding multimodal approach with healthy nutrition, healthy sleep, regular physical activity, social activities, counseling, and medications.      Coping skills reviewed and encouraged positive framing of thoughts     Assisted patient in processing above session content; acknowledged and normalized patient’s thoughts, feelings, and concerns.  Applied  positive coping skills and behavior management in session.  Allowed patient to freely discuss issues  without interruption or judgment. Provided safe, confidential environment to facilitate the development of positive therapeutic relationship and encourage open, honest communication. Assisted patient in identifying risk factors which would indicate the need for higher level of care including thoughts to harm self or others and/or self-harming behavior and encouraged patient to contact this office, call 911, or present to the nearest emergency room should any of these events occur. Discussed crisis intervention services and means to access.     MEDS ORDERED DURING VISIT:  No orders of the defined types were placed in this encounter.        Follow Up   Return in about 8 weeks (around 2/22/2023), or if symptoms worsen or fail to improve, for Recheck.  Encouraged patient if she had any worsening symptoms or concerns to contact the clinic for sooner appointment she verbalized understanding.    Patient was given instructions and counseling regarding her condition or for health maintenance advice. Please see specific information pulled into the AVS if appropriate.     Some of the data in this electronic note has been brought forward from a previous encounter, any necessary changes have been made, it has been reviewed by this APRN, and it is accurate.      This document has been electronically signed by SHARATH Cornell  December 28, 2022 09:25 EST    Part of this note may be an electronic transcription/translation of spoken language to printed text using the Dragon Dictation System.

## 2023-01-10 ENCOUNTER — LAB (OUTPATIENT)
Dept: INTERNAL MEDICINE | Facility: CLINIC | Age: 33
End: 2023-01-10
Payer: MEDICAID

## 2023-01-10 ENCOUNTER — TELEPHONE (OUTPATIENT)
Dept: INTERNAL MEDICINE | Facility: CLINIC | Age: 33
End: 2023-01-10

## 2023-01-10 ENCOUNTER — OFFICE VISIT (OUTPATIENT)
Dept: INTERNAL MEDICINE | Facility: CLINIC | Age: 33
End: 2023-01-10
Payer: MEDICAID

## 2023-01-10 VITALS
HEIGHT: 68 IN | BODY MASS INDEX: 40.71 KG/M2 | SYSTOLIC BLOOD PRESSURE: 118 MMHG | DIASTOLIC BLOOD PRESSURE: 80 MMHG | RESPIRATION RATE: 20 BRPM | OXYGEN SATURATION: 97 % | HEART RATE: 86 BPM | TEMPERATURE: 97 F | WEIGHT: 268.6 LBS

## 2023-01-10 DIAGNOSIS — E66.01 CLASS 3 SEVERE OBESITY WITHOUT SERIOUS COMORBIDITY WITH BODY MASS INDEX (BMI) OF 40.0 TO 44.9 IN ADULT, UNSPECIFIED OBESITY TYPE: Primary | ICD-10-CM

## 2023-01-10 DIAGNOSIS — R79.89 ABNORMAL CBC: ICD-10-CM

## 2023-01-10 LAB
BASOPHILS # BLD AUTO: 0.08 10*3/MM3 (ref 0–0.2)
BASOPHILS NFR BLD AUTO: 0.9 % (ref 0–1.5)
EOSINOPHIL # BLD AUTO: 0.11 10*3/MM3 (ref 0–0.4)
EOSINOPHIL NFR BLD AUTO: 1.3 % (ref 0.3–6.2)
ERYTHROCYTE [DISTWIDTH] IN BLOOD BY AUTOMATED COUNT: 13.7 % (ref 12.3–15.4)
HCT VFR BLD AUTO: 36.4 % (ref 34–46.6)
HGB BLD-MCNC: 11.8 G/DL (ref 12–15.9)
IMM GRANULOCYTES # BLD AUTO: 0.07 10*3/MM3 (ref 0–0.05)
IMM GRANULOCYTES NFR BLD AUTO: 0.8 % (ref 0–0.5)
LYMPHOCYTES # BLD AUTO: 3.19 10*3/MM3 (ref 0.7–3.1)
LYMPHOCYTES NFR BLD AUTO: 37.5 % (ref 19.6–45.3)
MCH RBC QN AUTO: 27.1 PG (ref 26.6–33)
MCHC RBC AUTO-ENTMCNC: 32.4 G/DL (ref 31.5–35.7)
MCV RBC AUTO: 83.7 FL (ref 79–97)
MONOCYTES # BLD AUTO: 0.64 10*3/MM3 (ref 0.1–0.9)
MONOCYTES NFR BLD AUTO: 7.5 % (ref 5–12)
NEUTROPHILS # BLD AUTO: 4.42 10*3/MM3 (ref 1.7–7)
NEUTROPHILS NFR BLD AUTO: 52 % (ref 42.7–76)
NRBC BLD AUTO-RTO: 0 /100 WBC (ref 0–0.2)
PLATELET # BLD AUTO: 350 10*3/MM3 (ref 140–450)
RBC # BLD AUTO: 4.35 10*6/MM3 (ref 3.77–5.28)
WBC # BLD AUTO: 8.51 10*3/MM3 (ref 3.4–10.8)

## 2023-01-10 PROCEDURE — 36415 COLL VENOUS BLD VENIPUNCTURE: CPT | Performed by: NURSE PRACTITIONER

## 2023-01-10 PROCEDURE — 99214 OFFICE O/P EST MOD 30 MIN: CPT | Performed by: NURSE PRACTITIONER

## 2023-01-10 NOTE — PROGRESS NOTES
Subjective   Hoa Lewis is a 32 y.o. female    Chief Complaint   Patient presents with   • lab results     Discuss labs to see if she could be prescribed wegovy   • Obesity            Obesity  This is a chronic problem. The current episode started more than 1 year ago. The problem occurs constantly. The problem has been gradually worsening. Pertinent negatives include no abdominal pain, anorexia, arthralgias, change in bowel habit, chest pain, chills, congestion, coughing, diaphoresis, fatigue, fever, headaches, joint swelling, myalgias, nausea, neck pain, numbness, rash, sore throat, swollen glands, urinary symptoms, vertigo, visual change, vomiting or weakness. The symptoms are aggravated by eating. Treatments tried: diet, exercise. The treatment provided no relief.      Recent labs show slightly elevated WBC.  Will repeat today.      The following portions of the patient's history were reviewed and updated as appropriate: allergies, current medications, past family history, past medical history, past social history, past surgical history and problem list.    Current Outpatient Medications:   •  buPROPion XL (WELLBUTRIN XL) 150 MG 24 hr tablet, Take 1 tablet by mouth Every Morning., Disp: 90 tablet, Rfl: 1  •  multivitamin with minerals tablet tablet, Take 1 tablet by mouth Daily., Disp: , Rfl:   •  sertraline (Zoloft) 100 MG tablet, Take 1 tablet by mouth Daily for 90 days., Disp: 90 tablet, Rfl: 0  •  Tri-Lo-Sprintec 0.18/0.215/0.25 MG-25 MCG per tablet, TAKE 1 TABLET BY MOUTH DAILY, Disp: 28 tablet, Rfl: 2  •  Liraglutide (SAXENDA) 18 MG/3ML injection pen, Inject 0.6mg under skin daily for week one, THEN 1.2mg daily for week two, THEN 1.8mg daily for week three, then 2.4mg daily for week four., Disp: 3 mL, Rfl: 2     Review of Systems   Constitutional: Negative for chills, diaphoresis, fatigue and fever.   HENT: Negative for congestion and sore throat.    Respiratory: Negative for cough, chest  tightness and shortness of breath.    Cardiovascular: Negative for chest pain.   Gastrointestinal: Negative for abdominal pain, anorexia, change in bowel habit, diarrhea, nausea and vomiting.   Endocrine: Negative for cold intolerance and heat intolerance.   Musculoskeletal: Negative for arthralgias, joint swelling, myalgias and neck pain.   Skin: Negative for rash.   Neurological: Negative for dizziness, vertigo, weakness, numbness and headaches.       Objective   Physical Exam  Constitutional:       Appearance: She is well-developed. She is obese.   HENT:      Head: Normocephalic and atraumatic.   Eyes:      Conjunctiva/sclera: Conjunctivae normal.      Pupils: Pupils are equal, round, and reactive to light.   Cardiovascular:      Rate and Rhythm: Normal rate and regular rhythm.      Heart sounds: Normal heart sounds.   Pulmonary:      Effort: Pulmonary effort is normal.      Breath sounds: Normal breath sounds.   Abdominal:      General: Bowel sounds are normal.      Palpations: Abdomen is soft.   Musculoskeletal:         General: Normal range of motion.      Cervical back: Normal range of motion.   Skin:     General: Skin is warm and dry.   Neurological:      Mental Status: She is alert and oriented to person, place, and time.      Deep Tendon Reflexes: Reflexes are normal and symmetric.   Psychiatric:         Behavior: Behavior normal.         Thought Content: Thought content normal.         Judgment: Judgment normal.       Vitals:    01/10/23 0924   BP: 118/80   Cuff Size: Large Adult   Pulse: 86   Resp: 20   Temp: 97 °F (36.1 °C)   TempSrc: Infrared   SpO2: 97%   Weight: 122 kg (268 lb 9.6 oz)   Height: 172.7 cm (68\")         Assessment & Plan   Diagnoses and all orders for this visit:    1. Class 3 severe obesity without serious comorbidity with body mass index (BMI) of 40.0 to 44.9 in adult, unspecified obesity type (HCC) (Primary)  -     Liraglutide (SAXENDA) 18 MG/3ML injection pen; Inject 0.6mg under  skin daily for week one, THEN 1.2mg daily for week two, THEN 1.8mg daily for week three, then 2.4mg daily for week four.  Dispense: 3 mL; Refill: 2    2. Abnormal CBC  -     CBC & Differential; Future    Patient's (Body mass index is 40.84 kg/m².) indicates that they are morbidly obese (BMI > 40 or > 35 with obesity - related health condition) with health related conditions that include none . Weight is unchanged. BMI is is above average; BMI management plan is completed. We discussed portion control, increasing exercise and pharmacologic options including Saxenda.   Will start Saxenda for weight loss, SE's discusssed  Repeat CBC sent today  Return in about 4 weeks (around 2/7/2023) for f/u, collect labs today.

## 2023-01-10 NOTE — TELEPHONE ENCOUNTER
Caller: Milton Lewis    Relationship: Mother    Best call back number:[phone]     What medications are you currently taking:    Current Outpatient Medications on File Prior to Visit   Medication Sig Dispense Refill   • buPROPion XL (WELLBUTRIN XL) 150 MG 24 hr tablet Take 1 tablet by mouth Every Morning. 90 tablet 1   • Liraglutide (SAXENDA) 18 MG/3ML injection pen Inject 0.6mg under skin daily for week one, THEN 1.2mg daily for week two, THEN 1.8mg daily for week three, then 2.4mg daily for week four. 3 mL 2   • multivitamin with minerals tablet tablet Take 1 tablet by mouth Daily.     • sertraline (Zoloft) 100 MG tablet Take 1 tablet by mouth Daily for 90 days. 90 tablet 0   • Tri-Lo-Sprintec 0.18/0.215/0.25 MG-25 MCG per tablet TAKE 1 TABLET BY MOUTH DAILY 28 tablet 2     No current facility-administered medications on file prior to visit.      [id number] - [place] [address] [facility] AT [facility] [address] [facility] [address] [facility] - [phone]  - [phone]   [phone]    Which medication are you concerned about:  Liraglutide (SAXENDA) 18 MG/3ML injection pen     Who prescribed you this medication: SHARATH BLAND    What are your concerns: PATIENT NEEDS A PA FOR THIS MEDICATION    Additional Information: THE INSURANCE COMPANY ADVISES THE OFFICE TO CONTACT THE PHARMACY -[phone] TO START THE PA PROCESS.

## 2023-01-10 NOTE — PATIENT INSTRUCTIONS
Calorie Counting for Weight Loss  Calories are units of energy. Your body needs a certain number of calories from food to keep going throughout the day. When you eat or drink more calories than your body needs, your body stores the extra calories mostly as fat. When you eat or drink fewer calories than your body needs, your body burns fat to get the energy it needs.  Calorie counting means keeping track of how many calories you eat and drink each day. Calorie counting can be helpful if you need to lose weight. If you eat fewer calories than your body needs, you should lose weight. Ask your health care provider what a healthy weight is for you.  For calorie counting to work, you will need to eat the right number of calories each day to lose a healthy amount of weight per week. A dietitian can help you figure out how many calories you need in a day and will suggest ways to reach your calorie goal.  A healthy amount of weight to lose each week is usually 1-2 lb (0.5-0.9 kg). This usually means that your daily calorie intake should be reduced by 500-750 calories.  Eating 1,200-1,500 calories a day can help most women lose weight.  Eating 1,500-1,800 calories a day can help most men lose weight.  What do I need to know about calorie counting?  Work with your health care provider or dietitian to determine how many calories you should get each day. To meet your daily calorie goal, you will need to:  Find out how many calories are in each food that you would like to eat. Try to do this before you eat.  Decide how much of the food you plan to eat.  Keep a food log. Do this by writing down what you ate and how many calories it had.  To successfully lose weight, it is important to balance calorie counting with a healthy lifestyle that includes regular activity.  Where do I find calorie information?  The number of calories in a food can be found on a Nutrition Facts label. If a food does not have a Nutrition Facts label, try to  look up the calories online or ask your dietitian for help.  Remember that calories are listed per serving. If you choose to have more than one serving of a food, you will have to multiply the calories per serving by the number of servings you plan to eat. For example, the label on a package of bread might say that a serving size is 1 slice and that there are 90 calories in a serving. If you eat 1 slice, you will have eaten 90 calories. If you eat 2 slices, you will have eaten 180 calories.  How do I keep a food log?  After each time that you eat, record the following in your food log as soon as possible:  What you ate. Be sure to include toppings, sauces, and other extras on the food.  How much you ate. This can be measured in cups, ounces, or number of items.  How many calories were in each food and drink.  The total number of calories in the food you ate.  Keep your food log near you, such as in a pocket-sized notebook or on an suzanne or website on your mobile phone. Some programs will calculate calories for you and show you how many calories you have left to meet your daily goal.  What are some portion-control tips?  Know how many calories are in a serving. This will help you know how many servings you can have of a certain food.  Use a measuring cup to measure serving sizes. You could also try weighing out portions on a kitchen scale. With time, you will be able to estimate serving sizes for some foods.  Take time to put servings of different foods on your favorite plates or in your favorite bowls and cups so you know what a serving looks like.  Try not to eat straight from a food's packaging, such as from a bag or box. Eating straight from the package makes it hard to see how much you are eating and can lead to overeating. Put the amount you would like to eat in a cup or on a plate to make sure you are eating the right portion.  Use smaller plates, glasses, and bowls for smaller portions and to prevent  overeating.  Try not to multitask. For example, avoid watching TV or using your computer while eating. If it is time to eat, sit down at a table and enjoy your food. This will help you recognize when you are full. It will also help you be more mindful of what and how much you are eating.  What are tips for following this plan?  Reading food labels  Check the calorie count compared with the serving size. The serving size may be smaller than what you are used to eating.  Check the source of the calories. Try to choose foods that are high in protein, fiber, and vitamins, and low in saturated fat, trans fat, and sodium.  Shopping  Read nutrition labels while you shop. This will help you make healthy decisions about which foods to buy.  Pay attention to nutrition labels for low-fat or fat-free foods. These foods sometimes have the same number of calories or more calories than the full-fat versions. They also often have added sugar, starch, or salt to make up for flavor that was removed with the fat.  Make a grocery list of lower-calorie foods and stick to it.  Cooking  Try to cook your favorite foods in a healthier way. For example, try baking instead of frying.  Use low-fat dairy products.  Meal planning  Use more fruits and vegetables. One-half of your plate should be fruits and vegetables.  Include lean proteins, such as chicken, turkey, and fish.  Lifestyle  Each week, aim to do one of the followin minutes of moderate exercise, such as walking.  75 minutes of vigorous exercise, such as running.  General information  Know how many calories are in the foods you eat most often. This will help you calculate calorie counts faster.  Find a way of tracking calories that works for you. Get creative. Try different apps or programs if writing down calories does not work for you.  What foods should I eat?    Eat nutritious foods. It is better to have a nutritious, high-calorie food, such as an avocado, than a food with  few nutrients, such as a bag of potato chips.  Use your calories on foods and drinks that will fill you up and will not leave you hungry soon after eating.  Examples of foods that fill you up are nuts and nut butters, vegetables, lean proteins, and high-fiber foods such as whole grains. High-fiber foods are foods with more than 5 g of fiber per serving.  Pay attention to calories in drinks. Low-calorie drinks include water and unsweetened drinks.  The items listed above may not be a complete list of foods and beverages you can eat. Contact a dietitian for more information.  What foods should I limit?  Limit foods or drinks that are not good sources of vitamins, minerals, or protein or that are high in unhealthy fats. These include:  Candy.  Other sweets.  Sodas, specialty coffee drinks, alcohol, and juice.  The items listed above may not be a complete list of foods and beverages you should avoid. Contact a dietitian for more information.  How do I count calories when eating out?  Pay attention to portions. Often, portions are much larger when eating out. Try these tips to keep portions smaller:  Consider sharing a meal instead of getting your own.  If you get your own meal, eat only half of it. Before you start eating, ask for a container and put half of your meal into it.  When available, consider ordering smaller portions from the menu instead of full portions.  Pay attention to your food and drink choices. Knowing the way food is cooked and what is included with the meal can help you eat fewer calories.  If calories are listed on the menu, choose the lower-calorie options.  Choose dishes that include vegetables, fruits, whole grains, low-fat dairy products, and lean proteins.  Choose items that are boiled, broiled, grilled, or steamed. Avoid items that are buttered, battered, fried, or served with cream sauce. Items labeled as crispy are usually fried, unless stated otherwise.  Choose water, low-fat milk,  unsweetened iced tea, or other drinks without added sugar. If you want an alcoholic beverage, choose a lower-calorie option, such as a glass of wine or light beer.  Ask for dressings, sauces, and syrups on the side. These are usually high in calories, so you should limit the amount you eat.  If you want a salad, choose a garden salad and ask for grilled meats. Avoid extra toppings such as oglesby, cheese, or fried items. Ask for the dressing on the side, or ask for olive oil and vinegar or lemon to use as dressing.  Estimate how many servings of a food you are given. Knowing serving sizes will help you be aware of how much food you are eating at restaurants.  Where to find more information  Centers for Disease Control and Prevention: www.cdc.gov  [place].S. Department of Agriculture: myplate.gov  Summary  Calorie counting means keeping track of how many calories you eat and drink each day. If you eat fewer calories than your body needs, you should lose weight.  A healthy amount of weight to lose per week is usually 1-2 lb (0.5-0.9 kg). This usually means reducing your daily calorie intake by 500-750 calories.  The number of calories in a food can be found on a Nutrition Facts label. If a food does not have a Nutrition Facts label, try to look up the calories online or ask your dietitian for help.  Use smaller plates, glasses, and bowls for smaller portions and to prevent overeating.  Use your calories on foods and drinks that will fill you up and not leave you hungry shortly after a meal.  This information is not intended to replace advice given to you by your health care provider. Make sure you discuss any questions you have with your health care provider.  Document Revised: 01/28/2021 Document Reviewed: 01/28/2021  Elsevier Patient Education © 2022 Elsevier Inc.  Exercising to Lose Weight  Getting regular exercise is important for everyone. It is especially important if you are overweight. Being overweight increases your  risk of heart disease, stroke, diabetes, high blood pressure, and several types of cancer. Exercising, and reducing the calories you consume, can help you lose weight and improve fitness and health.  Exercise can be moderate or vigorous intensity. To lose weight, most people need to do a certain amount of moderate or vigorous-intensity exercise each week.  How can exercise affect me?  You lose weight when you exercise enough to burn more calories than you eat. Exercise also reduces body fat and builds muscle. The more muscle you have, the more calories you burn. Exercise also:  Improves mood.  Reduces stress and tension.  Improves your overall fitness, flexibility, and endurance.  Increases bone strength.  Moderate-intensity exercise  Moderate-intensity exercise is any activity that gets you moving enough to burn at least three times more energy (calories) than if you were sitting.  Examples of moderate exercise include:  Walking a mile in 15 minutes.  Doing light yard work.  Biking at an easy pace.  Most people should get at least 150 minutes of moderate-intensity exercise a week to maintain their body weight.  Vigorous-intensity exercise  Vigorous-intensity exercise is any activity that gets you moving enough to burn at least six times more calories than if you were sitting. When you exercise at this intensity, you should be working hard enough that you are not able to carry on a conversation.  Examples of vigorous exercise include:  Running.  Playing a team sport, such as football, basketball, and soccer.  Jumping rope.  Most people should get at least 75 minutes a week of vigorous exercise to maintain their body weight.  What actions can I take to lose weight?  The amount of exercise you need to lose weight depends on:  Your age.  The type of exercise.  Any health conditions you have.  Your overall physical ability.  Talk to your health care provider about how much exercise you need and what types of activities  are safe for you.  Nutrition    Make changes to your diet as told by your health care provider or diet and nutrition specialist (dietitian). This may include:  Eating fewer calories.  Eating more protein.  Eating less unhealthy fats.  Eating a diet that includes fresh fruits and vegetables, whole grains, low-fat dairy products, and lean protein.  Avoiding foods with added fat, salt, and sugar.  Drink plenty of water while you exercise to prevent dehydration or heat stroke.  Activity  Choose an activity that you enjoy and set realistic goals. Your health care provider can help you make an exercise plan that works for you.  Exercise at a moderate or vigorous intensity most days of the week.  The intensity of exercise may vary from person to person. You can tell how intense a workout is for you by paying attention to your breathing and heartbeat. Most people will notice their breathing and heartbeat get faster with more intense exercise.  Do resistance training twice each week, such as:  Push-ups.  Sit-ups.  Lifting weights.  Using resistance bands.  Getting short amounts of exercise can be just as helpful as long, structured periods of exercise. If you have trouble finding time to exercise, try doing these things as part of your daily routine:  Get up, stretch, and walk around every 30 minutes throughout the day.  Go for a walk during your lunch break.  Park your car farther away from your destination.  If you take public transportation, get off one stop early and walk the rest of the way.  Make phone calls while standing up and walking around.  Take the stairs instead of elevators or escalators.  Wear comfortable clothes and shoes with good support.  Do not exercise so much that you hurt yourself, feel dizzy, or get very short of breath.  Where to find more information  U.S. Department of Health and Human Services: www.hhs.gov  Centers for Disease Control and Prevention: www.cdc.gov  Contact a health care  provider:  Before starting a new exercise program.  If you have questions or concerns about your weight.  If you have a medical problem that keeps you from exercising.  Get help right away if:  You have any of the following while exercising:  Injury.  Dizziness.  Difficulty breathing or shortness of breath that does not go away when you stop exercising.  Chest pain.  Rapid heartbeat.  These symptoms may represent a serious problem that is an emergency. Do not wait to see if the symptoms will go away. Get medical help right away. Call your local emergency services (911 in the [place] Do not drive yourself to the hospital.  Summary  Getting regular exercise is especially important if you are overweight.  Being overweight increases your risk of heart disease, stroke, diabetes, high blood pressure, and several types of cancer.  Losing weight happens when you burn more calories than you eat.  Reducing the amount of calories you eat, and getting regular moderate or vigorous exercise each week, helps you lose weight.  This information is not intended to replace advice given to you by your health care provider. Make sure you discuss any questions you have with your health care provider.  Document Revised: 02/13/2022 Document Reviewed: 02/13/2022  Elsevier Patient Education © 2022 Elsevier Inc.

## 2023-01-17 ENCOUNTER — TELEMEDICINE (OUTPATIENT)
Dept: PSYCHIATRY | Facility: CLINIC | Age: 33
End: 2023-01-17
Payer: MEDICAID

## 2023-01-17 DIAGNOSIS — F41.1 GENERALIZED ANXIETY DISORDER: Primary | ICD-10-CM

## 2023-01-17 PROCEDURE — 90832 PSYTX W PT 30 MINUTES: CPT | Performed by: COUNSELOR

## 2023-01-17 NOTE — PROGRESS NOTES
Date: January 17, 2023  Time In: 0830  Time Out: 0900  This provider is located at home address for [facility] (through [facility] [address], [place] [address] using a secure Trans[employer] Video Visit through [employer]. Patient is being seen remotely via telehealth at home address in [place] and stated they are in a secure environment for this session. The patient's condition being diagnosed/treated is appropriate for telemedicine. The provider identified herself as well as her credentials. The patient, and/or patients guardian, consent to be seen remotely, and when consent is given they understand that the consent allows for patient identifiable information to be sent to a third party as needed. They may refuse to be seen remotely at any time. The electronic data is encrypted and password protected, and the patient and/or guardian has been advised of the potential risks to privacy not withstanding such measures.     You have chosen to receive care through a telehealth visit.  Do you consent to use a video/audio connection for your medical care today? Yes    PROGRESS NOTE  Data:  Hoa Lewis is a 32 y.o. female who presents today for follow up    Chief Complaint: anxiety     History of Present Illness: Pt reports being concerned about her father's tendency to neglect his health and wellness. Pt reports that her father has had some medical concerns and has been asked to make certain changes in his lifestyle. Pt explains that despite these recommendations being important to father's health he does not follow through with them. Pt reports that this is frustrating to her. Pt reports that she has made it a priority to continue to advocate for herself and her health. Pt reports that she has been making health appointments and taking medication as prescribed. Pt reports that she has recommended others in her social social to begin therapy themselves.        Clinical Maneuvering/Intervention:    (Scales based on 0 - 10 with 10 being the worst)  Depression: 0 Anxiety: 2       Assisted patient in processing above session content; acknowledged and normalized patient’s thoughts, feelings, and concerns.  Rationalized patient thought process regarding recent stressors and life events. Discussed triggers associated with patient's emotions. Also discussed coping skills for patient to implement. Praised Pt for progress in treatment and continuing to be her own advocate.     Allowed patient to freely discuss issues without interruption or judgment. Provided safe, confidential environment to facilitate the development of positive therapeutic relationship and encourage open, honest communication. Assisted patient in identifying risk factors which would indicate the need for higher level of care including thoughts to harm self or others and/or self-harming behavior and encouraged patient to contact this office, call 911, or present to the nearest emergency room should any of these events occur. Discussed crisis intervention services and means to access. Patient adamantly and convincingly denies current suicidal or homicidal ideation or perceptual disturbance.    Assessment:   Assessment   Patient appears to maintain relative stability as compared to their baseline.  However, patient continues to struggle with anxiety which continues to cause impairment in important areas of functioning.  A result, they can be reasonably expected to continue to benefit from treatment and would likely be at increased risk for decompensation otherwise.    Mental Status Exam:   Hygiene:   good  Cooperation:  Cooperative  Eye Contact:  Good  Psychomotor Behavior:  Appropriate  Affect:  Full range  Mood: normal  Speech:  Normal  Thought Process:  Linear  Thought Content:  Normal  Suicidal:  None  Homicidal:  None  Hallucinations:  None  Delusion:  None  Memory:  Intact  Orientation:  Person, Place,  Time and Situation  Reliability:  fair  Insight:  Fair  Judgement:  Good  Impulse Control:  Good  Physical/Medical Issues:  No        Patient's Support Network Includes:  extended family    Functional Status: Mild impairment     Progress toward goal: Not at goal    Prognosis: Good with Ongoing Treatment          Plan:    Patient will continue in individual outpatient therapy with focus on improved functioning and coping skills, maintaining stability, and avoiding decompensation and the need for higher level of care.    Patient will adhere to medication regimen as prescribed and report any side effects. Patient will contact this office, call 911 or present to the nearest emergency room should suicidal or homicidal ideations occur. Provide Cognitive Behavioral Therapy and Solution Focused Therapy to improve functioning, maintain stability, and avoid decompensation and the need for higher level of care.     Return in about 6 weeks, or earlier if symptoms worsen or fail to improve.           VISIT DIAGNOSIS:     ICD-10-CM ICD-9-CM   1. Generalized anxiety disorder  F41.1 300.02        Diagnoses and all orders for this visit:    1. Generalized anxiety disorder (Primary)           [facility] No Show Policy:  We understand unexpected circumstances arise; however, anytime you miss your appointment we are unable to provide you appropriate care.  In addition, each appointment missed could have been used to provide care for others.  We ask that you call at least 24 hours in advance to cancel or reschedule an appointment.  We would like to take this opportunity to remind you of our policy stating patients who miss THREE or more appointments without cancelling or rescheduling 24 hours in advance of the appointment may be subject to cancellation of any further visits with our clinic and recommendation to seek in-person services/visits.    Please call [phone] to reschedule your appointment. If there  are reasons that make it difficult for you to keep the appointments, please call and let us know how we can help.  Please understand that medication prescribing will not continue without seeing your provider.      [facility]'s No Show Policy reviewed with patient at today's visit. Patient verbalized understanding of this policy. Discussed with patient that in the event that there are three or more no show visits, it will be recommended that they pursue in-person services/visits as noncompliance with telehealth visits indicates that patient is not an appropriate candidate for telemedicine and would likely be more appropriate for in-person services/visits. Patient verbalizes understanding and is agreeable to this.        This document has been electronically signed by Hoa Bettencourt LCSW  January 17, 2023 12:34 EST      Part of this note may be an electronic transcription/translation of spoken language to printed text using the Dragon Dictation System.

## 2023-01-18 NOTE — TELEPHONE ENCOUNTER
Called and spoke with patient's mother, informed her that the PA has been submitted. She verbalized understanding and had no further questions.     KEY: [id number]

## 2023-01-20 ENCOUNTER — TELEPHONE (OUTPATIENT)
Dept: INTERNAL MEDICINE | Facility: CLINIC | Age: 33
End: 2023-01-20
Payer: MEDICAID

## 2023-01-20 ENCOUNTER — PRIOR AUTHORIZATION (OUTPATIENT)
Dept: INTERNAL MEDICINE | Facility: CLINIC | Age: 33
End: 2023-01-20
Payer: MEDICAID

## 2023-01-20 NOTE — TELEPHONE ENCOUNTER
Provider: SHARATH BLAND    Caller: ENRIQUE CONWAY    Relationship to Patient: MOTHER    Phone Number: [phone]    Reason for Call: THE INSURANCE PHARMACY COMPANY IS STATING THEY HAVE NOT RECEIVED THE PRIOR AUTHORIZATION FOR THE SAXENDA.     PATIENT HAS GIVEN HER MOTHER A DIRECT NUMBER TO CALL TO START THE PROCESS...[phone].

## 2023-01-20 NOTE — TELEPHONE ENCOUNTER
PA for sabrina sent to insurance through cover my meds. Waiting for response.     Key: [id number]    DENIED BY INSURANCE ON 1/23/23

## 2023-01-26 ENCOUNTER — TELEPHONE (OUTPATIENT)
Dept: INTERNAL MEDICINE | Facility: CLINIC | Age: 33
End: 2023-01-26
Payer: MEDICAID

## 2023-01-26 NOTE — TELEPHONE ENCOUNTER
PT NOTIFIED THAT THIS WAS DENIE AND  WAS ADVISED THAT SHE COULD CALL HER INSURANCE COMPANY AND ASK IF THERE IS A MEDICATION THAT MAY BE COVERED FOR WEIGHT LOSS. PT VERBALIZED UNDERSTANDING

## 2023-01-26 NOTE — TELEPHONE ENCOUNTER
Caller: Milton Lewis    Relationship: Mother    Best call back number: [phone]    What is the best time to reach you: ANYTIME     Who are you requesting to speak with (clinical staff, provider,  specific staff member): CLINICAL STAFF     What was the call regarding: PATIENT'S MOTHER IS CALLING TO LET THE OFFICE KNOW THAT SHE HAS BEEN INFORMED THAT MEDICAID DOES NOT COVER WEIGHT LOSS MEDICATION. THEY DID TELL HER THAT THERE IS A MEDICATION THAT SHE CAN HAVE THAT  CAN HELP WITH WEIGHT LOSS, AND THAT IS PHENTERMINE. THE PATIENT WOULD LIKE TO HAVE THE JEET FORM OF THE MEDICATION     Do you require a callback: NO

## 2023-01-27 NOTE — TELEPHONE ENCOUNTER
I typically do not write for phentermine due to SE's/risks.  She can make an appt to discuss, or I can refer to the weight management program at [facility]

## 2023-01-30 DIAGNOSIS — N92.0 MENORRHAGIA WITH REGULAR CYCLE: ICD-10-CM

## 2023-01-30 NOTE — TELEPHONE ENCOUNTER
Called and spoke with patient's mother Milton (on  [place]), informed her that Nya would not be able to prescribe this medication and she can schedule an appointment to discuss further options. She asked if we could send her a [employer] message as well.

## 2023-01-31 RX ORDER — NORGESTIMATE AND ETHINYL ESTRADIOL
KIT
Qty: 28 TABLET | Refills: 2 | Status: SHIPPED | OUTPATIENT
Start: 2023-01-31

## 2023-02-09 DIAGNOSIS — F41.1 GENERALIZED ANXIETY DISORDER: ICD-10-CM

## 2023-02-09 DIAGNOSIS — F33.0 MILD EPISODE OF RECURRENT MAJOR DEPRESSIVE DISORDER: ICD-10-CM

## 2023-02-09 RX ORDER — SERTRALINE HYDROCHLORIDE 100 MG/1
TABLET, FILM COATED ORAL
Qty: 90 TABLET | Refills: 0 | Status: SHIPPED | OUTPATIENT
Start: 2023-02-09

## 2023-02-27 ENCOUNTER — TELEMEDICINE (OUTPATIENT)
Dept: PSYCHIATRY | Facility: CLINIC | Age: 33
End: 2023-02-27
Payer: MEDICAID

## 2023-02-27 DIAGNOSIS — F33.0 MILD EPISODE OF RECURRENT MAJOR DEPRESSIVE DISORDER: ICD-10-CM

## 2023-02-27 DIAGNOSIS — F41.1 GENERALIZED ANXIETY DISORDER: Primary | ICD-10-CM

## 2023-02-27 PROCEDURE — 99214 OFFICE O/P EST MOD 30 MIN: CPT | Performed by: NURSE PRACTITIONER

## 2023-02-27 RX ORDER — BUPROPION HYDROCHLORIDE 150 MG/1
150 TABLET ORAL EVERY MORNING
Qty: 90 TABLET | Refills: 1 | Status: SHIPPED | OUTPATIENT
Start: 2023-02-27

## 2023-02-27 NOTE — PROGRESS NOTES
"This provider is located at [facility], [address] [place] [address].The Patient is seen remotely at home,  [place] [address] via [employer].  Patient is being seen via telehealth and confirm that they are in a secure environment for this session. The patient's condition being diagnosed/treated is appropriate for telemedicine. The provider identified himself/herself: herself as well as her credentials.   The patient gave consent to be seen remotely, and when consent is given they understand that the consent allows for patient identifiable information to be sent to a third party as needed.   They may refuse to be seen remotely at any time. The electronic data is encrypted and password protected, and the patient has been advised of the potential risks to privacy not withstanding such measures.    You have chosen to receive care through a telehealth visit.  Do you consent to use a video/audio connection for your medical care today? Yes. Patient verified Name, , and address.       Chief Complaint  Anxiety and depression     Subjective    Hoa Abril Lewis presents to [facility] medication management.     History of Present Illness   Patient presents today reporting that she is doing \"pretty good\".  She states occasionally she will have anxiety from work but reports overall she is doing well.  Patient states that 1 or 2 days out of the week she may struggle to get out of the bed and get motivated but otherwise she has been doing better with this.  Patient reports that she is trying to get out and do more things as it is hard to find [employer] but when she finds an interest she fixates on that.  Patient states her symptoms are not as bad as previously that she is trying to get out and do things.  Denies any hopeless or helpless or depressed feelings.  She states she may have had 1 or 2 days where she felt down in the past " several months.  Patient reports that she is sleeping well at times not wanting to get out of bed.  Notes her appetite has been good as she is on Saxenda for weight loss.  Patient reports anxiety a 1-2 on a scale of 0-10 with 10 being the worst.  Denies any side effects to the medications.  Denies any SI/HI/AH/VH.      Objective   Vital Signs:   There were no vitals taken for this visit.  Due to the remote nature of this encounter (virtual encounter), vitals were unable to be obtained.  Height stated at 67.9 inches.  Weight stated at 265 pounds.        PHQ-9 Score:   PHQ-9 Total Score:       Patient screened positive for depression based on a PHQ-9 score of  on . Follow-up recommendations include: see notes and medication list.    PHQ-9 Depression Screening  Little interest or pleasure in doing things? 1-->several days   Feeling down, depressed, or hopeless? 0-->not at all   Trouble falling or staying asleep, or sleeping too much? 1-->several days   Feeling tired or having little energy? 1-->several days   Poor appetite or overeating? 0-->not at all   Feeling bad about yourself - or that you are a failure or have let yourself or your family down? 0-->not at all   Trouble concentrating on things, such as reading the newspaper or watching television? 0-->not at all   Moving or speaking so slowly that other people could have noticed? Or the opposite - being so fidgety or restless that you have been moving around a lot more than usual? 0-->not at all   Thoughts that you would be better off dead, or of hurting yourself in some way? 0-->not at all   PHQ-9 Total Score 3   If you checked off any problems, how difficult have these problems made it for you to do your work, take care of things at home, or get along with other people? not difficult at all       PHQ-9 Total Score: 3           PROMIS scale screening tool that patient filled out virtually reviewed by this APRN at today's encounter.      Mental Status Exam:    Hygiene:   good  Cooperation:  Cooperative  Eye Contact:  Fair  Psychomotor Behavior:  Restless  Affect:  Appropriate  Mood: normal  Speech:  Normal  Thought Process:  Goal directed  Thought Content:  Normal  Suicidal:  None  Homicidal:  None  Hallucinations:  None  Delusion:  None  Memory:  Intact  Orientation:  Person, Place, Time and Situation  Reliability:  good  Insight:  Good and Fair  Judgement:  Good and Fair  Impulse Control:  Good and Fair  Physical/Medical Issues:  No      Current Medications:   Current Outpatient Medications   Medication Sig Dispense Refill   • buPROPion XL (WELLBUTRIN XL) 150 MG 24 hr tablet Take 1 tablet by mouth Every Morning. 90 tablet 1   • Liraglutide (SAXENDA) 18 MG/3ML injection pen Inject 0.6mg under skin daily for week one, THEN 1.2mg daily for week two, THEN 1.8mg daily for week three, then 2.4mg daily for week four. 3 mL 2   • multivitamin with minerals tablet tablet Take 1 tablet by mouth Daily.     • sertraline (ZOLOFT) 100 MG tablet TAKE 1 TABLET BY MOUTH DAILY 90 tablet 0   • Tri-Lo-Sprintec 0.18/0.215/0.25 MG-25 MCG per tablet TAKE 1 TABLET BY MOUTH DAILY 28 tablet 2     No current facility-administered medications for this visit.       Physical Exam  Nursing note reviewed.   Constitutional:       Appearance: Normal appearance.   Neurological:      Mental Status: She is alert.   Psychiatric:         Attention and Perception: Attention and perception normal.         Mood and Affect: Mood and affect normal. Mood is not anxious or depressed.         Speech: Speech normal.         Behavior: Behavior is cooperative.         Thought Content: Thought content normal.         Cognition and Memory: Cognition and memory normal.         Judgment: Judgment normal.        Result Review :     The following data was reviewed by: SHARATH Cornell on 03/17/2022:  Common labs    Common Labs 9/29/22 9/29/22 9/29/22 12/19/22 12/19/22 12/19/22 1/10/23    1421 1421 1421 1044 1044 1044     Glucose  84   93     BUN  11   14     Creatinine  0.57   0.69     Sodium  137   137     Potassium  3.9   4.4     Chloride  100   101     Calcium  8.9   9.5     Total Protein  6.5   7.0     Albumin  4.10   4.30     Total Bilirubin  0.6   0.3     Alkaline Phosphatase  71   82     AST (SGOT)  16   11     ALT (SGPT)  18   14     WBC   10.05   11.22 (A) 8.51   Hemoglobin   11.0 (A)   11.7 (A) 11.8 (A)   Hematocrit   34.8   35.9 36.4   Platelets   330   336 350   Total Cholesterol 239 (A)   279 (A)      Triglycerides 249 (A)   375 (A)      HDL Cholesterol 66 (A)   58      LDL Cholesterol  129 (A)   151 (A)      (A) Abnormal value       Comments are available for some flowsheets but are not being displayed.           CMP    CMP 9/29/22 12/19/22   Glucose 84 93   BUN 11 14   Creatinine 0.57 0.69   Sodium 137 137   Potassium 3.9 4.4   Chloride 100 101   Calcium 8.9 9.5   Total Protein 6.5 7.0   Albumin 4.10 4.30   Globulin 2.4 2.7   Total Bilirubin 0.6 0.3   Alkaline Phosphatase 71 82   AST (SGOT) 16 11   ALT (SGPT) 18 14   BUN/Creatinine Ratio 19.3 20.3           CBC    CBC 9/29/22 12/19/22 1/10/23   WBC 10.05 11.22 (A) 8.51   RBC 4.08 4.35 4.35   Hemoglobin 11.0 (A) 11.7 (A) 11.8 (A)   Hematocrit 34.8 35.9 36.4   MCV 85.3 82.5 83.7   MCH 27.0 26.9 27.1   MCHC 31.6 32.6 32.4   RDW 13.2 13.9 13.7   Platelets 330 336 350   (A) Abnormal value            CBC w/diff    CBC w/Diff 8/27/21 9/7/21   WBC 8.50 7.15   RBC 4.09 4.41   Hemoglobin 10.8 (A) 11.6 (A)   Hematocrit 34.6 35.9   MCV 84.6 81.4   MCH 26.4 (A) 26.3 (A)   MCHC 31.2 (A) 32.3   RDW 13.9 13.5   Platelets 332 359   Neutrophil Rel % 58.8 54.7   Immature Granulocyte Rel % 0.7 (A) 0.6 (A)   Lymphocyte Rel % 32.1 35.2   Monocyte Rel % 5.8 6.7   Eosinophil Rel % 1.8 1.8   Basophil Rel % 0.8 1.0   (A) Abnormal value            Lipid Panel    Lipid Panel 9/29/22 12/19/22   Total Cholesterol 239 (A) 279 (A)   Triglycerides 249 (A) 375 (A)   HDL Cholesterol 66 (A) 58   VLDL  Cholesterol 44 (A) 70 (A)   LDL Cholesterol  129 (A) 151 (A)   (A) Abnormal value       Comments are available for some flowsheets but are not being displayed.           TSH    TSH 9/29/22 12/19/22   TSH 2.300 3.490           Electrolytes    Electrolytes 9/29/22 12/19/22   Sodium 137 137   Potassium 3.9 4.4   Chloride 100 101   Calcium 8.9 9.5           Renal Profile    Renal Profile 9/29/22 12/19/22   BUN 11 14   Creatinine 0.57 0.69                   Data reviewed: PCP notes        Assessment and Plan    Problem List Items Addressed This Visit    None  Visit Diagnoses     Generalized anxiety disorder    -  Primary    Relevant Medications    buPROPion XL (WELLBUTRIN XL) 150 MG 24 hr tablet    Mild episode of recurrent major depressive disorder (HCC)        Relevant Medications    buPROPion XL (WELLBUTRIN XL) 150 MG 24 hr tablet            TREATMENT PLAN/GOALS: Continue supportive psychotherapy efforts and medications as indicated. Treatment and medication options discussed during today's visit. Patient ackowledged and verbally consented to continue with current treatment plan and was educated on the importance of compliance with treatment and follow-up appointments.    MEDICATION ISSUES:  We discussed risks, benefits, and side effects of the above medications and the patient was agreeable with the plan. Patient was educated on the importance of compliance with treatment and follow-up appointments.  Patient is agreeable to call the office with any worsening of symptoms or onset of side effects. Patient is agreeable to call 911 or go to the nearest ER should he/she begin having SI/HI. Patient was strongly encouraged to continue birth control.  Patient was counseled regarding need not to become pregnant prior to discussion and possible titration and discontinuation of medications.  An explanation was provided to the patient regarding the risk of fetal harm with psychotrophic medications.  Patient was provided  education regarding both risk of continuing and discontinuing medications during pregnancy.  Patient verbalized understanding.        -Continue Wellbutrin 150 mg XL daily as adjunct for depression.  -Continue sertraline 100 mg for depressive and anxiety symptoms.   -Continue psychotherapy with Hoa Bettencourt.  -Encouraged the patient since she is taking medication for weight loss as well as it being summertime if she still felt a lack of motivation and decreased energy we could increase the Wellbutrin and to contact the clinic she verbalized understanding.      Counseled patient regarding multimodal approach with healthy nutrition, healthy sleep, regular physical activity, social activities, counseling, and medications.      Coping skills reviewed and encouraged positive framing of thoughts     Assisted patient in processing above session content; acknowledged and normalized patient’s thoughts, feelings, and concerns.  Applied  positive coping skills and behavior management in session.  Allowed patient to freely discuss issues without interruption or judgment. Provided safe, confidential environment to facilitate the development of positive therapeutic relationship and encourage open, honest communication. Assisted patient in identifying risk factors which would indicate the need for higher level of care including thoughts to harm self or others and/or self-harming behavior and encouraged patient to contact this office, call 911, or present to the nearest emergency room should any of these events occur. Discussed crisis intervention services and means to access.     MEDS ORDERED DURING VISIT:  New Medications Ordered This Visit   Medications   • buPROPion XL (WELLBUTRIN XL) 150 MG 24 hr tablet     Sig: Take 1 tablet by mouth Every Morning.     Dispense:  90 tablet     Refill:  1         Follow Up   Return in about 6 weeks (around 4/10/2023), or if symptoms worsen or fail to improve, for Recheck.  Encouraged patient if  she had any worsening symptoms or concerns to contact the clinic for sooner appointment she verbalized understanding.    Patient was given instructions and counseling regarding her condition or for health maintenance advice. Please see specific information pulled into the AVS if appropriate.     Some of the data in this electronic note has been brought forward from a previous encounter, any necessary changes have been made, it has been reviewed by this APRN, and it is accurate.      This document has been electronically signed by SHARATH Cornell  February 27, 2023 09:22 EST    Part of this note may be an electronic transcription/translation of spoken language to printed text using the Dragon Dictation System.

## 2023-03-09 ENCOUNTER — TELEMEDICINE (OUTPATIENT)
Dept: PSYCHIATRY | Facility: CLINIC | Age: 33
End: 2023-03-09
Payer: MEDICAID

## 2023-03-09 DIAGNOSIS — F41.1 GENERALIZED ANXIETY DISORDER: Primary | ICD-10-CM

## 2023-03-09 PROCEDURE — 90832 PSYTX W PT 30 MINUTES: CPT | Performed by: COUNSELOR

## 2023-03-09 NOTE — PROGRESS NOTES
Date: March 9, 2023  Time In: 0827  Time Out: 0843  This provider is located at home address for [facility] (through [facility] [address], [place] [address] using a secure [employer] Video Visit through [employer]. Patient is being seen remotely via telehealth at home address in [place] and stated they are in a secure environment for this session. The patient's condition being diagnosed/treated is appropriate for telemedicine. The provider identified herself as well as her credentials. The patient, and/or patients guardian, consent to be seen remotely, and when consent is given they understand that the consent allows for patient identifiable information to be sent to a third party as needed. They may refuse to be seen remotely at any time. The electronic data is encrypted and password protected, and the patient and/or guardian has been advised of the potential risks to privacy not withstanding such measures.     You have chosen to receive care through a telehealth visit.  Do you consent to use a video/audio connection for your medical care today? Yes    PROGRESS NOTE  Data:  Hoa Lewis is a 32 y.o. female who presents today for follow up    Chief Complaint: Anxiety     History of Present Illness: Pt reports that she is feeling better mentally and emotionally. Pt reports that since she has been paying attention to herself and her health she has felt more confident. Pt reports that she has hearing aids now and can hear better. Pt reports that hearing alone has reduced anxiety. Pt reports that she has been able to communicate effectively and enjoy listening to music and softer noise.       Clinical Maneuvering/Intervention:    (Scales based on 0 - 10 with 10 being the worst)  Depression: 0 Anxiety: 2       Assisted patient in processing above session content; acknowledged and normalized patient’s thoughts, feelings, and concerns.  Rationalized patient  thought process regarding recent stressors and life events. Discussed triggers associated with patient's emotions. Also discussed coping skills for patient to implement such as continuing to focus and prioritize self.    Allowed patient to freely discuss issues without interruption or judgment. Provided safe, confidential environment to facilitate the development of positive therapeutic relationship and encourage open, honest communication. Assisted patient in identifying risk factors which would indicate the need for higher level of care including thoughts to harm self or others and/or self-harming behavior and encouraged patient to contact this office, call 911, or present to the nearest emergency room should any of these events occur. Discussed crisis intervention services and means to access. Patient adamantly and convincingly denies current suicidal or homicidal ideation or perceptual disturbance.    Assessment:   Assessment   Patient appears to maintain relative stability as compared to their baseline.  However, patient continues to struggle with anxiety which continues to cause impairment in important areas of functioning.  A result, they can be reasonably expected to continue to benefit from treatment and would likely be at increased risk for decompensation otherwise.    Mental Status Exam:   Hygiene:   good  Cooperation:  Cooperative  Eye Contact:  Good  Psychomotor Behavior:  Appropriate  Affect:  Appropriate  Mood: normal  Speech:  Normal  Thought Process:  Linear  Thought Content:  Mood congruent  Suicidal:  None  Homicidal:  None  Hallucinations:  None  Delusion:  None  Memory:  Intact  Orientation:  Person, Place, Time and Situation  Reliability:  fair  Insight:  Fair  Judgement:  Fair  Impulse Control:  Fair  Physical/Medical Issues:  No        Patient's Support Network Includes:  extended family    Functional Status: Mild impairment     Progress toward goal: Not at goal    Prognosis: Fair with  Ongoing Treatment                        Plan:    Patient will continue in individual outpatient therapy with focus on improved functioning and coping skills, maintaining stability, and avoiding decompensation and the need for higher level of care.    Patient will adhere to medication regimen as prescribed and report any side effects. Patient will contact this office, call 911 or present to the nearest emergency room should suicidal or homicidal ideations occur. Provide Cognitive Behavioral Therapy and Solution Focused Therapy to improve functioning, maintain stability, and avoid decompensation and the need for higher level of care.     Return in about July or earlier if symptoms worsen or fail to improve.         VISIT DIAGNOSIS:     ICD-10-CM ICD-9-CM   1. Generalized anxiety disorder  F41.1 300.02        Diagnoses and all orders for this visit:    1. Generalized anxiety disorder (Primary)           [facility] No Show Policy:  We understand unexpected circumstances arise; however, anytime you miss your appointment we are unable to provide you appropriate care.  In addition, each appointment missed could have been used to provide care for others.  We ask that you call at least 24 hours in advance to cancel or reschedule an appointment.  We would like to take this opportunity to remind you of our policy stating patients who miss THREE or more appointments without cancelling or rescheduling 24 hours in advance of the appointment may be subject to cancellation of any further visits with our clinic and recommendation to seek in-person services/visits.    Please call [phone] to reschedule your appointment. If there are reasons that make it difficult for you to keep the appointments, please call and let us know how we can help.  Please understand that medication prescribing will not continue without seeing your provider.      [facility]'s No Show Policy reviewed with  patient at today's visit. Patient verbalized understanding of this policy. Discussed with patient that in the event that there are three or more no show visits, it will be recommended that they pursue in-person services/visits as noncompliance with telehealth visits indicates that patient is not an appropriate candidate for telemedicine and would likely be more appropriate for in-person services/visits. Patient verbalizes understanding and is agreeable to this.        This document has been electronically signed by Hoa Bettencourt LCSW  March 9, 2023 09:57 EST      Part of this note may be an electronic transcription/translation of spoken language to printed text using the Dragon Dictation System.

## 2023-04-10 ENCOUNTER — TELEMEDICINE (OUTPATIENT)
Dept: PSYCHIATRY | Facility: CLINIC | Age: 33
End: 2023-04-10
Payer: MEDICAID

## 2023-04-10 DIAGNOSIS — F33.0 MILD EPISODE OF RECURRENT MAJOR DEPRESSIVE DISORDER: ICD-10-CM

## 2023-04-10 DIAGNOSIS — F41.1 GENERALIZED ANXIETY DISORDER: Primary | ICD-10-CM

## 2023-04-10 PROCEDURE — 1159F MED LIST DOCD IN RCRD: CPT | Performed by: NURSE PRACTITIONER

## 2023-04-10 PROCEDURE — 99214 OFFICE O/P EST MOD 30 MIN: CPT | Performed by: NURSE PRACTITIONER

## 2023-04-10 PROCEDURE — 1160F RVW MEDS BY RX/DR IN RCRD: CPT | Performed by: NURSE PRACTITIONER

## 2023-04-10 RX ORDER — SERTRALINE HYDROCHLORIDE 100 MG/1
100 TABLET, FILM COATED ORAL DAILY
Qty: 90 TABLET | Refills: 0 | Status: SHIPPED | OUTPATIENT
Start: 2023-04-10

## 2023-04-10 NOTE — PROGRESS NOTES
"This provider is located at [facility], [address] [place] [place] [address].The Patient is seen remotely at home,  [place] [address] [facility]  Patient is being seen via telehealth and confirm that they are in a secure environment for this session. The patient's condition being diagnosed/treated is appropriate for telemedicine. The provider identified himself/herself: herself as well as her credentials.   The patient gave consent to be seen remotely, and when consent is given they understand that the consent allows for patient identifiable information to be sent to a third party as needed.   They may refuse to be seen remotely at any time. The electronic data is encrypted and password protected, and the patient has been advised of the potential risks to privacy not withstanding such measures.    You have chosen to receive care through a telehealth visit.  Do you consent to use a video/audio connection for your medical care today? Yes. Patient verified Name, , and address.       Chief Complaint  Anxiety and depression     Subjective    Hoa Abril Lewis presents to [facility] management.     History of Present Illness   Patient presents today reporting that things have been going \"pretty good\".  Patient states that she has been going to the gym and watching her calories as well as taking multivitamins as well as fish oil and other vitamins which she believes have been helpful.  She states that anxiety does come and go however she has not had any panic attacks and feels it is manageable with everyday normal life stressors.  Patient denies any major depressive symptoms or feeling hopeless or helpless as she states exercise has helped.  Patient rates her depression at 2 anxiety a 4 on a scale of 0-10 with 10 being worst.  She denies any side effects to medications.  Reports her appetite has been decreased.  " Patient states she is sleeping good however she was noticing she is still tired in the evening and having some other concerns to her PCP ordered an in-home sleep study which she notes she will be getting this week to rule out any sleep apnea.  Patient denies any other new medical or medicine changes.  Denies any SI/HI/AH/VH.      Objective   Vital Signs:   There were no vitals taken for this visit.  Due to the remote nature of this encounter (virtual encounter), vitals were unable to be obtained.  Height stated at 67.9 inches.  Weight stated at 265 pounds.        PHQ-9 Score:   PHQ-9 Total Score:       Patient screened positive for depression based on a PHQ-9 score of  on . Follow-up recommendations include: see notes and medication list.    PHQ-9 Depression Screening  Little interest or pleasure in doing things?     Feeling down, depressed, or hopeless?     Trouble falling or staying asleep, or sleeping too much?     Feeling tired or having little energy?     Poor appetite or overeating?     Feeling bad about yourself - or that you are a failure or have let yourself or your family down?     Trouble concentrating on things, such as reading the newspaper or watching television?     Moving or speaking so slowly that other people could have noticed? Or the opposite - being so fidgety or restless that you have been moving around a lot more than usual?     Thoughts that you would be better off dead, or of hurting yourself in some way?     PHQ-9 Total Score     If you checked off any problems, how difficult have these problems made it for you to do your work, take care of things at home, or get along with other people?         PHQ-9 Total Score:             PROMIS scale screening tool that patient filled out virtually reviewed by this APRN at today's encounter.      Mental Status Exam:   Hygiene:   good  Cooperation:  Cooperative  Eye Contact:  Fair  Psychomotor Behavior:  Restless  Affect:  Appropriate  Mood:  normal  Speech:  Normal  Thought Process:  Goal directed  Thought Content:  Normal  Suicidal:  None  Homicidal:  None  Hallucinations:  None  Delusion:  None  Memory:  Intact  Orientation:  Person, Place, Time and Situation  Reliability:  good  Insight:  Good and Fair  Judgement:  Good and Fair  Impulse Control:  Good and Fair  Physical/Medical Issues:  No      Current Medications:   Current Outpatient Medications   Medication Sig Dispense Refill   • sertraline (ZOLOFT) 100 MG tablet Take 1 tablet by mouth Daily. 90 tablet 0   • buPROPion XL (WELLBUTRIN XL) 150 MG 24 hr tablet Take 1 tablet by mouth Every Morning. 90 tablet 1   • Liraglutide (SAXENDA) 18 MG/3ML injection pen Inject 0.6mg under skin daily for week one, THEN 1.2mg daily for week two, THEN 1.8mg daily for week three, then 2.4mg daily for week four. 3 mL 2   • multivitamin with minerals tablet tablet Take 1 tablet by mouth Daily.     • Tri-Lo-Sprintec 0.18/0.215/0.25 MG-25 MCG per tablet TAKE 1 TABLET BY MOUTH DAILY 28 tablet 2     No current facility-administered medications for this visit.       Physical Exam  Nursing note reviewed.   Constitutional:       Appearance: Normal appearance.   Neurological:      Mental Status: She is alert.   Psychiatric:         Attention and Perception: Attention and perception normal.         Mood and Affect: Mood and affect normal. Mood is not anxious or depressed.         Speech: Speech normal.         Behavior: Behavior is cooperative.         Thought Content: Thought content normal.         Cognition and Memory: Cognition and memory normal.         Judgment: Judgment normal.        Result Review :     The following data was reviewed by: SHARATH Cornell on 03/17/2022:  Common labs    Common Labs 9/29/22 9/29/22 9/29/22 12/19/22 12/19/22 12/19/22 1/10/23    1421 1421 1421 1044 1044 1044    Glucose  84   93     BUN  11   14     Creatinine  0.57   0.69     Sodium  137   137     Potassium  3.9   4.4     Chloride   100   101     Calcium  8.9   9.5     Total Protein  6.5   7.0     Albumin  4.10   4.30     Total Bilirubin  0.6   0.3     Alkaline Phosphatase  71   82     AST (SGOT)  16   11     ALT (SGPT)  18   14     WBC   10.05   11.22 (A) 8.51   Hemoglobin   11.0 (A)   11.7 (A) 11.8 (A)   Hematocrit   34.8   35.9 36.4   Platelets   330   336 350   Total Cholesterol 239 (A)   279 (A)      Triglycerides 249 (A)   375 (A)      HDL Cholesterol 66 (A)   58      LDL Cholesterol  129 (A)   151 (A)      (A) Abnormal value       Comments are available for some flowsheets but are not being displayed.           CMP    CMP 9/29/22 12/19/22   Glucose 84 93   BUN 11 14   Creatinine 0.57 0.69   Sodium 137 137   Potassium 3.9 4.4   Chloride 100 101   Calcium 8.9 9.5   Total Protein 6.5 7.0   Albumin 4.10 4.30   Globulin 2.4 2.7   Total Bilirubin 0.6 0.3   Alkaline Phosphatase 71 82   AST (SGOT) 16 11   ALT (SGPT) 18 14   BUN/Creatinine Ratio 19.3 20.3           CBC    CBC 9/29/22 12/19/22 1/10/23   WBC 10.05 11.22 (A) 8.51   RBC 4.08 4.35 4.35   Hemoglobin 11.0 (A) 11.7 (A) 11.8 (A)   Hematocrit 34.8 35.9 36.4   MCV 85.3 82.5 83.7   MCH 27.0 26.9 27.1   MCHC 31.6 32.6 32.4   RDW 13.2 13.9 13.7   Platelets 330 336 350   (A) Abnormal value            CBC w/diff    CBC w/Diff 8/27/21 9/7/21   WBC 8.50 7.15   RBC 4.09 4.41   Hemoglobin 10.8 (A) 11.6 (A)   Hematocrit 34.6 35.9   MCV 84.6 81.4   MCH 26.4 (A) 26.3 (A)   MCHC 31.2 (A) 32.3   RDW 13.9 13.5   Platelets 332 359   Neutrophil Rel % 58.8 54.7   Immature Granulocyte Rel % 0.7 (A) 0.6 (A)   Lymphocyte Rel % 32.1 35.2   Monocyte Rel % 5.8 6.7   Eosinophil Rel % 1.8 1.8   Basophil Rel % 0.8 1.0   (A) Abnormal value            Lipid Panel    Lipid Panel 9/29/22 12/19/22   Total Cholesterol 239 (A) 279 (A)   Triglycerides 249 (A) 375 (A)   HDL Cholesterol 66 (A) 58   VLDL Cholesterol 44 (A) 70 (A)   LDL Cholesterol  129 (A) 151 (A)   (A) Abnormal value       Comments are available for some  flowsheets but are not being displayed.           TSH    TSH 9/29/22 12/19/22   TSH 2.300 3.490           Electrolytes    Electrolytes 9/29/22 12/19/22   Sodium 137 137   Potassium 3.9 4.4   Chloride 100 101   Calcium 8.9 9.5           Renal Profile    Renal Profile 9/29/22 12/19/22   BUN 11 14   Creatinine 0.57 0.69                   Data reviewed: PCP notes        Assessment and Plan    Problem List Items Addressed This Visit    None  Visit Diagnoses     Generalized anxiety disorder    -  Primary    Relevant Medications    sertraline (ZOLOFT) 100 MG tablet    Mild episode of recurrent major depressive disorder        Relevant Medications    sertraline (ZOLOFT) 100 MG tablet            TREATMENT PLAN/GOALS: Continue supportive psychotherapy efforts and medications as indicated. Treatment and medication options discussed during today's visit. Patient ackowledged and verbally consented to continue with current treatment plan and was educated on the importance of compliance with treatment and follow-up appointments.    MEDICATION ISSUES:  We discussed risks, benefits, and side effects of the above medications and the patient was agreeable with the plan. Patient was educated on the importance of compliance with treatment and follow-up appointments.  Patient is agreeable to call the office with any worsening of symptoms or onset of side effects. Patient is agreeable to call 911 or go to the nearest ER should he/she begin having SI/HI. Patient was strongly encouraged to continue birth control.  Patient was counseled regarding need not to become pregnant prior to discussion and possible titration and discontinuation of medications.  An explanation was provided to the patient regarding the risk of fetal harm with psychotrophic medications.  Patient was provided education regarding both risk of continuing and discontinuing medications during pregnancy.  Patient verbalized understanding.        -Continue Wellbutrin 150 mg XL  daily as adjunct for depression.  -Continue sertraline 100 mg for depressive and anxiety symptoms.   -Continue psychotherapy with Hoa Bettencourt.        Counseled patient regarding multimodal approach with healthy nutrition, healthy sleep, regular physical activity, social activities, counseling, and medications.      Coping skills reviewed and encouraged positive framing of thoughts     Assisted patient in processing above session content; acknowledged and normalized patient’s thoughts, feelings, and concerns.  Applied  positive coping skills and behavior management in session.  Allowed patient to freely discuss issues without interruption or judgment. Provided safe, confidential environment to facilitate the development of positive therapeutic relationship and encourage open, honest communication. Assisted patient in identifying risk factors which would indicate the need for higher level of care including thoughts to harm self or others and/or self-harming behavior and encouraged patient to contact this office, call 911, or present to the nearest emergency room should any of these events occur. Discussed crisis intervention services and means to access.     MEDS ORDERED DURING VISIT:  New Medications Ordered This Visit   Medications   • sertraline (ZOLOFT) 100 MG tablet     Sig: Take 1 tablet by mouth Daily.     Dispense:  90 tablet     Refill:  0         Follow Up   Return in about 8 weeks (around 6/5/2023), or if symptoms worsen or fail to improve, for Recheck.  Encouraged patient if she had any worsening symptoms or concerns to contact the clinic for sooner appointment she verbalized understanding.    Patient was given instructions and counseling regarding her condition or for health maintenance advice. Please see specific information pulled into the AVS if appropriate.     Some of the data in this electronic note has been brought forward from a previous encounter, any necessary changes have been made, it has  been reviewed by this APRN, and it is accurate.      This document has been electronically signed by SHARATH Cornell  April 10, 2023 10:47 EDT    Part of this note may be an electronic transcription/translation of spoken language to printed text using the Dragon Dictation System.

## 2023-04-24 DIAGNOSIS — N92.0 MENORRHAGIA WITH REGULAR CYCLE: ICD-10-CM

## 2023-04-24 RX ORDER — NORGESTIMATE AND ETHINYL ESTRADIOL 7DAYSX3 LO
KIT ORAL
Qty: 28 TABLET | Refills: 2 | Status: SHIPPED | OUTPATIENT
Start: 2023-04-24

## 2023-04-24 NOTE — TELEPHONE ENCOUNTER
Hub staff attempted to follow warm transfer process and was unsuccessful     Caller: Hao Lewis    Relationship to patient: Self    Best call back number: [phone]    Patient is needing: THE PATIENT IS TRYING TO RETURN [facility] CALLS

## 2023-04-24 NOTE — TELEPHONE ENCOUNTER
LV: 01/10/2023  NV: 10/02/2023    Pt was supposed to follow up in 4 weeks from 1/10/2023 appt.      Called and lvm for patient to return call to update appt. Nya please confirm if she can keep appt in October or if she needs to be seen sooner

## 2023-05-16 ENCOUNTER — TELEMEDICINE (OUTPATIENT)
Dept: PSYCHIATRY | Facility: CLINIC | Age: 33
End: 2023-05-16
Payer: MEDICAID

## 2023-05-16 DIAGNOSIS — F41.1 GENERALIZED ANXIETY DISORDER: ICD-10-CM

## 2023-05-16 DIAGNOSIS — F33.0 MILD EPISODE OF RECURRENT MAJOR DEPRESSIVE DISORDER: Primary | ICD-10-CM

## 2023-05-16 PROCEDURE — 99214 OFFICE O/P EST MOD 30 MIN: CPT | Performed by: NURSE PRACTITIONER

## 2023-05-16 PROCEDURE — 1160F RVW MEDS BY RX/DR IN RCRD: CPT | Performed by: NURSE PRACTITIONER

## 2023-05-16 PROCEDURE — 1159F MED LIST DOCD IN RCRD: CPT | Performed by: NURSE PRACTITIONER

## 2023-05-16 RX ORDER — BUPROPION HYDROCHLORIDE 150 MG/1
150 TABLET ORAL EVERY MORNING
Qty: 90 TABLET | Refills: 1 | Status: SHIPPED | OUTPATIENT
Start: 2023-05-16

## 2023-05-16 NOTE — PROGRESS NOTES
This provider is located at [facility], [address] [place] [address].The Patient is seen remotely at home,  [place] [address] via [employer].  Patient is being seen via telehealth and confirm that they are in a secure environment for this session. The patient's condition being diagnosed/treated is appropriate for telemedicine. The provider identified himself/herself: herself as well as her credentials.   The patient gave consent to be seen remotely, and when consent is given they understand that the consent allows for patient identifiable information to be sent to a third party as needed.   They may refuse to be seen remotely at any time. The electronic data is encrypted and password protected, and the patient has been advised of the potential risks to privacy not withstanding such measures.    You have chosen to receive care through a telehealth visit.  Do you consent to use a video/audio connection for your medical care today? Yes. Patient verified Name, , and address.       Chief Complaint  Anxiety and depression     Subjective    Hoa Abril Lewis presents to [facility] management.     History of Present Illness   Patient presents today reporting that she wanted a sooner appointment as she believes she is on the autism spectrum in some aspects.  Patient states she has been watching a podcast as well as taking quizzes online and feeling as if she relates to them.  Patient states she has always had a hard time with eye contact and she [employer] reports movies.  She states it is very hard to relate to some people in social aspects as she has always been fine playing by herself.  She also states that she is a very bright person.  Patient notes that she has been sleeping and eating well.  Denies any major depressive symptoms or feeling hopeless or helpless.  She states some days her anxiety has been slightly  higher but otherwise she is managing well.  Rates depression at 2-3 anxiety a 4 on a scale of 0-10 with 10 being the worst.  Denies any side effects to medications.  Denies any SI/HI/AH/VH.    Objective   Vital Signs:   There were no vitals taken for this visit.  Due to the remote nature of this encounter (virtual encounter), vitals were unable to be obtained.  Height stated at 67.9 inches.  Weight stated at 265 pounds.        PHQ-9 Score:   PHQ-9 Total Score:       Patient screened positive for depression based on a PHQ-9 score of  on . Follow-up recommendations include: see notes and medication list.    PHQ-9 Depression Screening  Little interest or pleasure in doing things?     Feeling down, depressed, or hopeless?     Trouble falling or staying asleep, or sleeping too much?     Feeling tired or having little energy?     Poor appetite or overeating?     Feeling bad about yourself - or that you are a failure or have let yourself or your family down?     Trouble concentrating on things, such as reading the newspaper or watching television?     Moving or speaking so slowly that other people could have noticed? Or the opposite - being so fidgety or restless that you have been moving around a lot more than usual?     Thoughts that you would be better off dead, or of hurting yourself in some way?     PHQ-9 Total Score     If you checked off any problems, how difficult have these problems made it for you to do your work, take care of things at home, or get along with other people?         PHQ-9 Total Score:             PROMIS scale screening tool that patient filled out virtually reviewed by this APRN at today's encounter.      Mental Status Exam:   Hygiene:   good  Cooperation:  Cooperative  Eye Contact:  Fair  Psychomotor Behavior:  Restless  Affect:  Appropriate  Mood: normal  Speech:  Normal  Thought Process:  Goal directed  Thought Content:  Normal  Suicidal:  None  Homicidal:  None  Hallucinations:   None  Delusion:  None  Memory:  Intact  Orientation:  Person, Place, Time and Situation  Reliability:  good  Insight:  Good and Fair  Judgement:  Good and Fair  Impulse Control:  Good and Fair  Physical/Medical Issues:  No      Current Medications:   Current Outpatient Medications   Medication Sig Dispense Refill   • buPROPion XL (WELLBUTRIN XL) 150 MG 24 hr tablet Take 1 tablet by mouth Every Morning. 90 tablet 1   • Liraglutide (SAXENDA) 18 MG/3ML injection pen Inject 0.6mg under skin daily for week one, THEN 1.2mg daily for week two, THEN 1.8mg daily for week three, then 2.4mg daily for week four. 3 mL 2   • multivitamin with minerals tablet tablet Take 1 tablet by mouth Daily.     • sertraline (ZOLOFT) 100 MG tablet Take 1 tablet by mouth Daily. 90 tablet 0   • Tri-Lo-Pat 0.18/0.215/0.25 MG-25 MCG per tablet TAKE 1 TABLET BY MOUTH DAILY 28 tablet 2     No current facility-administered medications for this visit.       Physical Exam  Nursing note reviewed.   Constitutional:       Appearance: Normal appearance.   Neurological:      Mental Status: She is alert.   Psychiatric:         Attention and Perception: Attention and perception normal.         Mood and Affect: Mood and affect normal. Mood is not anxious or depressed.         Speech: Speech normal.         Behavior: Behavior is cooperative.         Thought Content: Thought content normal.         Cognition and Memory: Cognition and memory normal.         Judgment: Judgment normal.        Result Review :     The following data was reviewed by: SHARATH Cornell on 03/17/2022:  Common labs        9/29/2022    14:21 12/19/2022    10:44 1/10/2023    09:55   Common Labs   Glucose 84   93      BUN 11   14      Creatinine 0.57   0.69      Sodium 137   137      Potassium 3.9   4.4      Chloride 100   101      Calcium 8.9   9.5      Total Protein 6.5   7.0      Albumin 4.10   4.30      Total Bilirubin 0.6   0.3      Alkaline Phosphatase 71   82      AST (SGOT) 16    11      ALT (SGPT) 18   14      WBC 10.05   11.22   8.51     Hemoglobin 11.0   11.7   11.8     Hematocrit 34.8   35.9   36.4     Platelets 330   336   350     Total Cholesterol 239   279      Triglycerides 249   375      HDL Cholesterol 66   58      LDL Cholesterol  129   151        CMP        9/29/2022    14:21 12/19/2022    10:44   CMP   Glucose 84   93     BUN 11   14     Creatinine 0.57   0.69     Sodium 137   137     Potassium 3.9   4.4     Chloride 100   101     Calcium 8.9   9.5     Total Protein 6.5   7.0     Albumin 4.10   4.30     Globulin 2.4   2.7     Total Bilirubin 0.6   0.3     Alkaline Phosphatase 71   82     AST (SGOT) 16   11     ALT (SGPT) 18   14     BUN/Creatinine Ratio 19.3   20.3       CBC        9/29/2022    14:21 12/19/2022    10:44 1/10/2023    09:55   CBC   WBC 10.05   11.22   8.51     RBC 4.08   4.35   4.35     Hemoglobin 11.0   11.7   11.8     Hematocrit 34.8   35.9   36.4     MCV 85.3   82.5   83.7     MCH 27.0   26.9   27.1     MCHC 31.6   32.6   32.4     RDW 13.2   13.9   13.7     Platelets 330   336   350       CBC w/diff    CBC w/Diff 8/27/21 9/7/21   WBC 8.50 7.15   RBC 4.09 4.41   Hemoglobin 10.8 (A) 11.6 (A)   Hematocrit 34.6 35.9   MCV 84.6 81.4   MCH 26.4 (A) 26.3 (A)   MCHC 31.2 (A) 32.3   RDW 13.9 13.5   Platelets 332 359   Neutrophil Rel % 58.8 54.7   Immature Granulocyte Rel % 0.7 (A) 0.6 (A)   Lymphocyte Rel % 32.1 35.2   Monocyte Rel % 5.8 6.7   Eosinophil Rel % 1.8 1.8   Basophil Rel % 0.8 1.0   (A) Abnormal value            Lipid Panel        9/29/2022    14:21 12/19/2022    10:44   Lipid Panel   Total Cholesterol 239   279     Triglycerides 249   375     HDL Cholesterol 66   58     VLDL Cholesterol 44   70     LDL Cholesterol  129   151       TSH        9/29/2022    14:21 12/19/2022    10:44   TSH   TSH 2.300   3.490       Electrolytes        9/29/2022    14:21 12/19/2022    10:44   Electrolytes   Sodium 137   137     Potassium 3.9   4.4     Chloride 100   101      Calcium 8.9   9.5       Renal Profile        9/29/2022    14:21 12/19/2022    10:44   Renal Profile   BUN 11   14     Creatinine 0.57   0.69               Data reviewed: PCP notes        Assessment and Plan    Problem List Items Addressed This Visit    None  Visit Diagnoses     Mild episode of recurrent major depressive disorder    -  Primary    Relevant Medications    buPROPion XL (WELLBUTRIN XL) 150 MG 24 hr tablet    Generalized anxiety disorder        Relevant Medications    buPROPion XL (WELLBUTRIN XL) 150 MG 24 hr tablet            TREATMENT PLAN/GOALS: Continue supportive psychotherapy efforts and medications as indicated. Treatment and medication options discussed during today's visit. Patient ackowledged and verbally consented to continue with current treatment plan and was educated on the importance of compliance with treatment and follow-up appointments.    MEDICATION ISSUES:  We discussed risks, benefits, and side effects of the above medications and the patient was agreeable with the plan. Patient was educated on the importance of compliance with treatment and follow-up appointments.  Patient is agreeable to call the office with any worsening of symptoms or onset of side effects. Patient is agreeable to call 911 or go to the nearest ER should he/she begin having SI/HI. Patient was strongly encouraged to continue birth control.  Patient was counseled regarding need not to become pregnant prior to discussion and possible titration and discontinuation of medications.  An explanation was provided to the patient regarding the risk of fetal harm with psychotrophic medications.  Patient was provided education regarding both risk of continuing and discontinuing medications during pregnancy.  Patient verbalized understanding.        -Continue Wellbutrin 150 mg XL daily as adjunct for depression.  -Continue sertraline 100 mg for depressive and anxiety symptoms.   -Continue psychotherapy with Hoa  Sg.  -Patient believes she has or is on the autism spectrum so encouraged her to contact the back of her insurance card and asked for psychologist that do psychological testing in her area to get on the wait list be evaluated if she wishes verbalized understanding.      Counseled patient regarding multimodal approach with healthy nutrition, healthy sleep, regular physical activity, social activities, counseling, and medications.      Coping skills reviewed and encouraged positive framing of thoughts     Assisted patient in processing above session content; acknowledged and normalized patient’s thoughts, feelings, and concerns.  Applied  positive coping skills and behavior management in session.  Allowed patient to freely discuss issues without interruption or judgment. Provided safe, confidential environment to facilitate the development of positive therapeutic relationship and encourage open, honest communication. Assisted patient in identifying risk factors which would indicate the need for higher level of care including thoughts to harm self or others and/or self-harming behavior and encouraged patient to contact this office, call 911, or present to the nearest emergency room should any of these events occur. Discussed crisis intervention services and means to access.     MEDS ORDERED DURING VISIT:  New Medications Ordered This Visit   Medications   • buPROPion XL (WELLBUTRIN XL) 150 MG 24 hr tablet     Sig: Take 1 tablet by mouth Every Morning.     Dispense:  90 tablet     Refill:  1         Follow Up   Return in about 3 weeks (around 6/6/2023), or if symptoms worsen or fail to improve, for Recheck.  Encouraged patient if she had any worsening symptoms or concerns to contact the clinic for sooner appointment she verbalized understanding.    Patient was given instructions and counseling regarding her condition or for health maintenance advice. Please see specific information pulled into the AVS if  appropriate.     Some of the data in this electronic note has been brought forward from a previous encounter, any necessary changes have been made, it has been reviewed by this APRN, and it is accurate.      This document has been electronically signed by SHARATH Cornell  May 16, 2023 09:59 EDT    Part of this note may be an electronic transcription/translation of spoken language to printed text using the Dragon Dictation System.

## 2023-06-05 ENCOUNTER — TELEMEDICINE (OUTPATIENT)
Dept: PSYCHIATRY | Facility: CLINIC | Age: 33
End: 2023-06-05
Payer: MEDICAID

## 2023-06-05 DIAGNOSIS — F41.1 GENERALIZED ANXIETY DISORDER: ICD-10-CM

## 2023-06-05 DIAGNOSIS — F33.0 MILD EPISODE OF RECURRENT MAJOR DEPRESSIVE DISORDER: ICD-10-CM

## 2023-06-05 PROCEDURE — 1160F RVW MEDS BY RX/DR IN RCRD: CPT | Performed by: NURSE PRACTITIONER

## 2023-06-05 PROCEDURE — 1159F MED LIST DOCD IN RCRD: CPT | Performed by: NURSE PRACTITIONER

## 2023-06-05 PROCEDURE — 99214 OFFICE O/P EST MOD 30 MIN: CPT | Performed by: NURSE PRACTITIONER

## 2023-06-05 RX ORDER — SERTRALINE HYDROCHLORIDE 100 MG/1
100 TABLET, FILM COATED ORAL DAILY
Qty: 90 TABLET | Refills: 0 | Status: SHIPPED | OUTPATIENT
Start: 2023-06-05

## 2023-06-05 NOTE — PROGRESS NOTES
This provider is located at [facility], [address] [place] [address].The Patient is seen remotely at home,  [place] [address] [facility]  Patient is being seen via telehealth and confirm that they are in a secure environment for this session. The patient's condition being diagnosed/treated is appropriate for telemedicine. The provider identified himself/herself: herself as well as her credentials.   The patient gave consent to be seen remotely, and when consent is given they understand that the consent allows for patient identifiable information to be sent to a third party as needed.   They may refuse to be seen remotely at any time. The electronic data is encrypted and password protected, and the patient has been advised of the potential risks to privacy not withstanding such measures.    You have chosen to receive care through a telehealth visit.  Do you consent to use a video/audio connection for your medical care today? Yes. Patient verified Name, , and address.       Chief Complaint  Anxiety and depression     Subjective    Hoa Abril Lewis presents to [facility] medication management.     History of Present Illness   Patient presents today reporting that she is doing well.  She states that she still looking for someone to do the autism testing which is frustrating and causing some anxiety but reports it is manageable.  Patient states some places take her insurances and others do not so it has been a conflict for her.  Patient denies any hopeless or helpless feelings or any depressive symptoms.  She states in that aspect she is doing well.  Reports she is sleeping and eating well.  Denies any side effects to the medications except for acid reflux which she is taking over-the-counter medications so she is fine.  Denies any SI/HI/AH/VH.    Objective   Vital Signs:   There were no vitals taken for this visit.   Due to the remote nature of this encounter (virtual encounter), vitals were unable to be obtained.  Height stated at 67.9 inches.  Weight stated at 265 pounds.        PHQ-9 Score:   PHQ-9 Total Score:       Patient screened positive for depression based on a PHQ-9 score of  on . Follow-up recommendations include:  see notes and medication list .    PHQ-9 Depression Screening  Little interest or pleasure in doing things?     Feeling down, depressed, or hopeless?     Trouble falling or staying asleep, or sleeping too much?     Feeling tired or having little energy?     Poor appetite or overeating?     Feeling bad about yourself - or that you are a failure or have let yourself or your family down?     Trouble concentrating on things, such as reading the newspaper or watching television?     Moving or speaking so slowly that other people could have noticed? Or the opposite - being so fidgety or restless that you have been moving around a lot more than usual?     Thoughts that you would be better off dead, or of hurting yourself in some way?     PHQ-9 Total Score     If you checked off any problems, how difficult have these problems made it for you to do your work, take care of things at home, or get along with other people?         PHQ-9 Total Score:             PROMIS scale screening tool that patient filled out virtually reviewed by this APRN at today's encounter.      Mental Status Exam:   Hygiene:   good  Cooperation:  Cooperative  Eye Contact:  Fair  Psychomotor Behavior:  Restless  Affect:  Appropriate  Mood: normal  Speech:  Normal  Thought Process:  Goal directed  Thought Content:  Normal  Suicidal:  None  Homicidal:  None  Hallucinations:  None  Delusion:  None  Memory:  Intact  Orientation:  Person, Place, Time and Situation  Reliability:  good  Insight:  Good and Fair  Judgement:  Good and Fair  Impulse Control:  Good and Fair  Physical/Medical Issues:  No      Current Medications:   Current Outpatient  Medications   Medication Sig Dispense Refill    sertraline (ZOLOFT) 100 MG tablet Take 1 tablet by mouth Daily. 90 tablet 0    buPROPion XL (WELLBUTRIN XL) 150 MG 24 hr tablet Take 1 tablet by mouth Every Morning. 90 tablet 1    Liraglutide (SAXENDA) 18 MG/3ML injection pen Inject 0.6mg under skin daily for week one, THEN 1.2mg daily for week two, THEN 1.8mg daily for week three, then 2.4mg daily for week four. 3 mL 2    multivitamin with minerals tablet tablet Take 1 tablet by mouth Daily.      Tri-Lo-Pat 0.18/0.215/0.25 MG-25 MCG per tablet TAKE 1 TABLET BY MOUTH DAILY 28 tablet 2     No current facility-administered medications for this visit.       Physical Exam  Nursing note reviewed.   Constitutional:       Appearance: Normal appearance.   Neurological:      Mental Status: She is alert.   Psychiatric:         Attention and Perception: Attention and perception normal.         Mood and Affect: Mood and affect normal. Mood is anxious. Mood is not depressed.         Speech: Speech normal.         Behavior: Behavior is cooperative.         Thought Content: Thought content normal.         Cognition and Memory: Cognition and memory normal.         Judgment: Judgment normal.      Result Review :     The following data was reviewed by: SHARATH Cornell on 03/17/2022:  Common labs          9/29/2022    14:21 12/19/2022    10:44 1/10/2023    09:55   Common Labs   Glucose 84  93     BUN 11  14     Creatinine 0.57  0.69     Sodium 137  137     Potassium 3.9  4.4     Chloride 100  101     Calcium 8.9  9.5     Total Protein 6.5  7.0     Albumin 4.10  4.30     Total Bilirubin 0.6  0.3     Alkaline Phosphatase 71  82     AST (SGOT) 16  11     ALT (SGPT) 18  14     WBC 10.05  11.22  8.51    Hemoglobin 11.0  11.7  11.8    Hematocrit 34.8  35.9  36.4    Platelets 330  336  350    Total Cholesterol 239  279     Triglycerides 249  375     HDL Cholesterol 66  58     LDL Cholesterol  129  151       CMP          9/29/2022     14:21 12/19/2022    10:44   CMP   Glucose 84  93    BUN 11  14    Creatinine 0.57  0.69    Sodium 137  137    Potassium 3.9  4.4    Chloride 100  101    Calcium 8.9  9.5    Total Protein 6.5  7.0    Albumin 4.10  4.30    Globulin 2.4  2.7    Total Bilirubin 0.6  0.3    Alkaline Phosphatase 71  82    AST (SGOT) 16  11    ALT (SGPT) 18  14    BUN/Creatinine Ratio 19.3  20.3      CBC          9/29/2022    14:21 12/19/2022    10:44 1/10/2023    09:55   CBC   WBC 10.05  11.22  8.51    RBC 4.08  4.35  4.35    Hemoglobin 11.0  11.7  11.8    Hematocrit 34.8  35.9  36.4    MCV 85.3  82.5  83.7    MCH 27.0  26.9  27.1    MCHC 31.6  32.6  32.4    RDW 13.2  13.9  13.7    Platelets 330  336  350      CBC w/diff      CBC w/Diff 8/27/21 9/7/21   WBC 8.50 7.15   RBC 4.09 4.41   Hemoglobin 10.8 (A) 11.6 (A)   Hematocrit 34.6 35.9   MCV 84.6 81.4   MCH 26.4 (A) 26.3 (A)   MCHC 31.2 (A) 32.3   RDW 13.9 13.5   Platelets 332 359   Neutrophil Rel % 58.8 54.7   Immature Granulocyte Rel % 0.7 (A) 0.6 (A)   Lymphocyte Rel % 32.1 35.2   Monocyte Rel % 5.8 6.7   Eosinophil Rel % 1.8 1.8   Basophil Rel % 0.8 1.0   (A) Abnormal value              Lipid Panel          9/29/2022    14:21 12/19/2022    10:44   Lipid Panel   Total Cholesterol 239  279    Triglycerides 249  375    HDL Cholesterol 66  58    VLDL Cholesterol 44  70    LDL Cholesterol  129  151      TSH          9/29/2022    14:21 12/19/2022    10:44   TSH   TSH 2.300  3.490      Electrolytes          9/29/2022    14:21 12/19/2022    10:44   Electrolytes   Sodium 137  137    Potassium 3.9  4.4    Chloride 100  101    Calcium 8.9  9.5      Renal Profile          9/29/2022    14:21 12/19/2022    10:44   Renal Profile   BUN 11  14    Creatinine 0.57  0.69              Data reviewed: PCP notes         Assessment and Plan    Problem List Items Addressed This Visit    None  Visit Diagnoses       Generalized anxiety disorder        Relevant Medications    sertraline (ZOLOFT) 100 MG tablet     Mild episode of recurrent major depressive disorder        Relevant Medications    sertraline (ZOLOFT) 100 MG tablet            TREATMENT PLAN/GOALS: Continue supportive psychotherapy efforts and medications as indicated. Treatment and medication options discussed during today's visit. Patient ackowledged and verbally consented to continue with current treatment plan and was educated on the importance of compliance with treatment and follow-up appointments.    MEDICATION ISSUES:  We discussed risks, benefits, and side effects of the above medications and the patient was agreeable with the plan. Patient was educated on the importance of compliance with treatment and follow-up appointments.  Patient is agreeable to call the office with any worsening of symptoms or onset of side effects. Patient is agreeable to call 911 or go to the nearest ER should he/she begin having SI/HI. Patient was strongly encouraged to continue birth control.  Patient was counseled regarding need not to become pregnant prior to discussion and possible titration and discontinuation of medications.  An explanation was provided to the patient regarding the risk of fetal harm with psychotrophic medications.  Patient was provided education regarding both risk of continuing and discontinuing medications during pregnancy.  Patient verbalized understanding.        -Continue Wellbutrin 150 mg XL daily as adjunct for depression.  -Continue sertraline 100 mg for depressive and anxiety symptoms.   -Continue psychotherapy with Hoa Bettencourt.      Counseled patient regarding multimodal approach with healthy nutrition, healthy sleep, regular physical activity, social activities, counseling, and medications.      Coping skills reviewed and encouraged positive framing of thoughts     Assisted patient in processing above session content; acknowledged and normalized patient’s thoughts, feelings, and concerns.  Applied  positive coping skills and behavior  management in session.  Allowed patient to freely discuss issues without interruption or judgment. Provided safe, confidential environment to facilitate the development of positive therapeutic relationship and encourage open, honest communication. Assisted patient in identifying risk factors which would indicate the need for higher level of care including thoughts to harm self or others and/or self-harming behavior and encouraged patient to contact this office, call 911, or present to the nearest emergency room should any of these events occur. Discussed crisis intervention services and means to access.     MEDS ORDERED DURING VISIT:  New Medications Ordered This Visit   Medications    sertraline (ZOLOFT) 100 MG tablet     Sig: Take 1 tablet by mouth Daily.     Dispense:  90 tablet     Refill:  0         Follow Up   Return in about 6 weeks (around 7/17/2023), or if symptoms worsen or fail to improve, for Recheck.  Encouraged patient if she had any worsening symptoms or concerns to contact the clinic for sooner appointment she verbalized understanding.    Patient was given instructions and counseling regarding her condition or for health maintenance advice. Please see specific information pulled into the AVS if appropriate.     Some of the data in this electronic note has been brought forward from a previous encounter, any necessary changes have been made, it has been reviewed by this APRN, and it is accurate.      This document has been electronically signed by SHARATH Cornell  June 5, 2023 09:32 EDT    Part of this note may be an electronic transcription/translation of spoken language to printed text using the Dragon Dictation System.

## 2023-07-07 NOTE — TELEPHONE ENCOUNTER
Caller: Milton Lewis    Relationship: Mother    Best call back number: [phone]    What medications are you currently taking:   Current Outpatient Medications on File Prior to Visit   Medication Sig Dispense Refill   • buPROPion XL (WELLBUTRIN XL) 150 MG 24 hr tablet Take 1 tablet by mouth Every Morning. 90 tablet 1   • Liraglutide (SAXENDA) 18 MG/3ML injection pen Inject 0.6mg under skin daily for week one, THEN 1.2mg daily for week two, THEN 1.8mg daily for week three, then 2.4mg daily for week four. 3 mL 2   • multivitamin with minerals tablet tablet Take 1 tablet by mouth Daily.     • sertraline (Zoloft) 100 MG tablet Take 1 tablet by mouth Daily for 90 days. 90 tablet 0   • Tri-Lo-Sprintec 0.18/0.215/0.25 MG-25 MCG per tablet TAKE 1 TABLET BY MOUTH DAILY 28 tablet 2     No current facility-administered medications on file prior to visit.          When did you start taking these medications: NA    Which medication are you concerned about: Liraglutide (SAXENDA) 18 MG/3ML injection pen    Who prescribed you this medication:ARLYN CHUNG    What are your concerns: THIS MEDICATION IS NEEDING AN AUTHORIZATION AND SHE WAS CHECKING THE STATUS     [Telehealth (audio & video) - Individual/Group] : This visit was provided via telehealth using real-time 2-way audio visual technology. [Medical Office: ([facility])___] : The provider was located at the medical office in [unfilled]. [Home] : The patient, [unfilled], was located at home, [unfilled], at the time of the visit. [Verbal consent obtained from patient/other participant(s)] : Verbal consent for telehealth/telephonic services obtained from patient/other participant(s) [Patient] : Patient [FreeTextEntry2] : 4/13/2023 [FreeTextEntry1] : med management

## 2023-08-28 ENCOUNTER — TELEMEDICINE (OUTPATIENT)
Dept: PSYCHIATRY | Facility: CLINIC | Age: 33
End: 2023-08-28
Payer: COMMERCIAL

## 2023-08-28 DIAGNOSIS — F84.0 AUTISM SPECTRUM DISORDER: ICD-10-CM

## 2023-08-28 DIAGNOSIS — F33.41 RECURRENT MAJOR DEPRESSIVE DISORDER, IN PARTIAL REMISSION: Primary | ICD-10-CM

## 2023-08-28 DIAGNOSIS — F41.1 GENERALIZED ANXIETY DISORDER: ICD-10-CM

## 2023-08-28 PROCEDURE — 1159F MED LIST DOCD IN RCRD: CPT | Performed by: NURSE PRACTITIONER

## 2023-08-28 PROCEDURE — 99214 OFFICE O/P EST MOD 30 MIN: CPT | Performed by: NURSE PRACTITIONER

## 2023-08-28 PROCEDURE — 1160F RVW MEDS BY RX/DR IN RCRD: CPT | Performed by: NURSE PRACTITIONER

## 2023-08-28 RX ORDER — SERTRALINE HYDROCHLORIDE 100 MG/1
100 TABLET, FILM COATED ORAL DAILY
Qty: 90 TABLET | Refills: 0 | Status: SHIPPED | OUTPATIENT
Start: 2023-08-28

## 2023-08-28 RX ORDER — BUPROPION HYDROCHLORIDE 150 MG/1
150 TABLET ORAL EVERY MORNING
Qty: 90 TABLET | Refills: 1 | Status: SHIPPED | OUTPATIENT
Start: 2023-08-28

## 2023-08-28 NOTE — PROGRESS NOTES
This provider is located at [facility], [address] [place] [address].The Patient is seen remotely at home,  [place] [address] [facility]  Patient is being seen via telehealth and confirm that they are in a secure environment for this session. The patient's condition being diagnosed/treated is appropriate for telemedicine. The provider identified himself/herself: herself as well as her credentials.   The patient gave consent to be seen remotely, and when consent is given they understand that the consent allows for patient identifiable information to be sent to a third party as needed.   They may refuse to be seen remotely at any time. The electronic data is encrypted and password protected, and the patient has been advised of the potential risks to privacy not withstanding such measures.    You have chosen to receive care through a telehealth visit.  Do you consent to use a video/audio connection for your medical care today? Yes. Patient verified Name, , and address.       Chief Complaint  Anxiety and depression     Subjective    Hoa Abril Lewis presents to [facility] medication management.     History of Present Illness   Patient presents today reporting that she is doing good.  Patient states that she did get diagnostic and psychological testing to get answers for being on the autism spectrum.  According to therapist at [facility] counseling patient is autism spectrum disorder level 1 with social impairment but no intellectual impairment see media tab for full results.  Patient felt as if this gave her relief in her anxiety and answer questions that she had wondered about.  Patient reports that she felt this was the missing puzzle pieces and has joined communities for support and been listening to podcast.  Reports anxiety has been manageable.  Denies any depressive symptoms or feeling hopeless or helpless states  that her mood is actually been uplifted.  States she is doing well on third shift as she is more productive and sleeping and eating well.  Denies any new medical or medicine changes.  Denies any side effects to medications.  Denies any SI/HI/AH/VH.    Objective   Vital Signs:   There were no vitals taken for this visit.  Due to the remote nature of this encounter (virtual encounter), vitals were unable to be obtained.  Height stated at 67.9 inches.  Weight stated at 265 pounds.        PHQ-9 Score:   PHQ-9 Total Score:       Patient screened positive for depression based on a PHQ-9 score of  on . Follow-up recommendations include:  see notes and medication list .    PHQ-9 Depression Screening  Little interest or pleasure in doing things?     Feeling down, depressed, or hopeless?     Trouble falling or staying asleep, or sleeping too much?     Feeling tired or having little energy?     Poor appetite or overeating?     Feeling bad about yourself - or that you are a failure or have let yourself or your family down?     Trouble concentrating on things, such as reading the newspaper or watching television?     Moving or speaking so slowly that other people could have noticed? Or the opposite - being so fidgety or restless that you have been moving around a lot more than usual?     Thoughts that you would be better off dead, or of hurting yourself in some way?     PHQ-9 Total Score     If you checked off any problems, how difficult have these problems made it for you to do your work, take care of things at home, or get along with other people?         PHQ-9 Total Score:             PROMIS scale screening tool that patient filled out virtually reviewed by this APRN at today's encounter.      Mental Status Exam:   Hygiene:   good  Cooperation:  Cooperative  Eye Contact:  Fair  Psychomotor Behavior:  Restless  Affect:  Appropriate  Mood: normal  Speech:  Normal  Thought Process:  Goal directed  Thought Content:   Normal  Suicidal:  None  Homicidal:  None  Hallucinations:  None  Delusion:  None  Memory:  Intact  Orientation:  Person, Place, Time and Situation  Reliability:  good  Insight:  Good and Fair  Judgement:  Good and Fair  Impulse Control:  Good and Fair  Physical/Medical Issues:  No      Current Medications:   Current Outpatient Medications   Medication Sig Dispense Refill    buPROPion XL (WELLBUTRIN XL) 150 MG 24 hr tablet Take 1 tablet by mouth Every Morning. 90 tablet 1    sertraline (ZOLOFT) 100 MG tablet Take 1 tablet by mouth Daily. 90 tablet 0    Liraglutide (SAXENDA) 18 MG/3ML injection pen Inject 0.6mg under skin daily for week one, THEN 1.2mg daily for week two, THEN 1.8mg daily for week three, then 2.4mg daily for week four. 3 mL 2    multivitamin with minerals tablet tablet Take 1 tablet by mouth Daily.      norgestimate-ethinyl estradiol (Tri-Lo-Pat) 0.18/0.215/0.25 MG-25 MCG per tablet TAKE 1 TABLET BY MOUTH DAILY 84 tablet 1     No current facility-administered medications for this visit.       Physical Exam  Nursing note reviewed.   Constitutional:       Appearance: Normal appearance.   Neurological:      Mental Status: She is alert.   Psychiatric:         Attention and Perception: Attention and perception normal.         Mood and Affect: Affect normal. Mood is not anxious or depressed.         Speech: Speech normal.         Behavior: Behavior is cooperative.         Thought Content: Thought content normal.         Cognition and Memory: Cognition and memory normal.         Judgment: Judgment normal.      Result Review :     The following data was reviewed by: SHARATH Cornell on 03/17/2022:  Common labs          9/29/2022    14:21 12/19/2022    10:44 1/10/2023    09:55   Common Labs   Glucose 84  93     BUN 11  14     Creatinine 0.57  0.69     Sodium 137  137     Potassium 3.9  4.4     Chloride 100  101     Calcium 8.9  9.5     Total Protein 6.5  7.0     Albumin 4.10  4.30     Total Bilirubin  0.6  0.3     Alkaline Phosphatase 71  82     AST (SGOT) 16  11     ALT (SGPT) 18  14     WBC 10.05  11.22  8.51    Hemoglobin 11.0  11.7  11.8    Hematocrit 34.8  35.9  36.4    Platelets 330  336  350    Total Cholesterol 239  279     Triglycerides 249  375     HDL Cholesterol 66  58     LDL Cholesterol  129  151       CMP          9/29/2022    14:21 12/19/2022    10:44   CMP   Glucose 84  93    BUN 11  14    Creatinine 0.57  0.69    Sodium 137  137    Potassium 3.9  4.4    Chloride 100  101    Calcium 8.9  9.5    Total Protein 6.5  7.0    Albumin 4.10  4.30    Globulin 2.4  2.7    Total Bilirubin 0.6  0.3    Alkaline Phosphatase 71  82    AST (SGOT) 16  11    ALT (SGPT) 18  14    BUN/Creatinine Ratio 19.3  20.3      CBC          9/29/2022    14:21 12/19/2022    10:44 1/10/2023    09:55   CBC   WBC 10.05  11.22  8.51    RBC 4.08  4.35  4.35    Hemoglobin 11.0  11.7  11.8    Hematocrit 34.8  35.9  36.4    MCV 85.3  82.5  83.7    MCH 27.0  26.9  27.1    MCHC 31.6  32.6  32.4    RDW 13.2  13.9  13.7    Platelets 330  336  350      CBC w/diff      CBC w/Diff 8/27/21 9/7/21   WBC 8.50 7.15   RBC 4.09 4.41   Hemoglobin 10.8 (A) 11.6 (A)   Hematocrit 34.6 35.9   MCV 84.6 81.4   MCH 26.4 (A) 26.3 (A)   MCHC 31.2 (A) 32.3   RDW 13.9 13.5   Platelets 332 359   Neutrophil Rel % 58.8 54.7   Immature Granulocyte Rel % 0.7 (A) 0.6 (A)   Lymphocyte Rel % 32.1 35.2   Monocyte Rel % 5.8 6.7   Eosinophil Rel % 1.8 1.8   Basophil Rel % 0.8 1.0   (A) Abnormal value              Lipid Panel          9/29/2022    14:21 12/19/2022    10:44   Lipid Panel   Total Cholesterol 239  279    Triglycerides 249  375    HDL Cholesterol 66  58    VLDL Cholesterol 44  70    LDL Cholesterol  129  151      TSH          9/29/2022    14:21 12/19/2022    10:44   TSH   TSH 2.300  3.490      Electrolytes          9/29/2022    14:21 12/19/2022    10:44   Electrolytes   Sodium 137  137    Potassium 3.9  4.4    Chloride 100  101    Calcium 8.9  9.5      Renal  Profile          9/29/2022    14:21 12/19/2022    10:44   Renal Profile   BUN 11  14    Creatinine 0.57  0.69            Data reviewed: PCP notes         Assessment and Plan    Problem List Items Addressed This Visit    None  Visit Diagnoses       Recurrent major depressive disorder, in partial remission    -  Primary    Relevant Medications    sertraline (ZOLOFT) 100 MG tablet    buPROPion XL (WELLBUTRIN XL) 150 MG 24 hr tablet    Generalized anxiety disorder        Relevant Medications    sertraline (ZOLOFT) 100 MG tablet    buPROPion XL (WELLBUTRIN XL) 150 MG 24 hr tablet    Autism spectrum disorder        Relevant Medications    sertraline (ZOLOFT) 100 MG tablet    buPROPion XL (WELLBUTRIN XL) 150 MG 24 hr tablet        TREATMENT PLAN/GOALS: Continue supportive psychotherapy efforts and medications as indicated. Treatment and medication options discussed during today's visit. Patient ackowledged and verbally consented to continue with current treatment plan and was educated on the importance of compliance with treatment and follow-up appointments.    MEDICATION ISSUES:  We discussed risks, benefits, and side effects of the above medications and the patient was agreeable with the plan. Patient was educated on the importance of compliance with treatment and follow-up appointments.  Patient is agreeable to call the office with any worsening of symptoms or onset of side effects. Patient is agreeable to call 911 or go to the nearest ER should he/she begin having SI/HI. Patient was strongly encouraged to continue birth control.  Patient was counseled regarding need not to become pregnant prior to discussion and possible titration and discontinuation of medications.  An explanation was provided to the patient regarding the risk of fetal harm with psychotrophic medications.  Patient was provided education regarding both risk of continuing and discontinuing medications during pregnancy.  Patient verbalized  understanding.        -Continue Wellbutrin 150 mg XL daily as adjunct for depression.  -Continue sertraline 100 mg for depressive and anxiety symptoms.   -Continue psychotherapy with Hoa Bettencourt.      Counseled patient regarding multimodal approach with healthy nutrition, healthy sleep, regular physical activity, social activities, counseling, and medications.      Coping skills reviewed and encouraged positive framing of thoughts     Assisted patient in processing above session content; acknowledged and normalized patient's thoughts, feelings, and concerns.  Applied  positive coping skills and behavior management in session.  Allowed patient to freely discuss issues without interruption or judgment. Provided safe, confidential environment to facilitate the development of positive therapeutic relationship and encourage open, honest communication. Assisted patient in identifying risk factors which would indicate the need for higher level of care including thoughts to harm self or others and/or self-harming behavior and encouraged patient to contact this office, call 911, or present to the nearest emergency room should any of these events occur. Discussed crisis intervention services and means to access.     MEDS ORDERED DURING VISIT:  New Medications Ordered This Visit   Medications    sertraline (ZOLOFT) 100 MG tablet     Sig: Take 1 tablet by mouth Daily.     Dispense:  90 tablet     Refill:  0    buPROPion XL (WELLBUTRIN XL) 150 MG 24 hr tablet     Sig: Take 1 tablet by mouth Every Morning.     Dispense:  90 tablet     Refill:  1         Follow Up   Return in about 8 weeks (around 10/23/2023), or if symptoms worsen or fail to improve, for Recheck.  Encouraged patient if she had any worsening symptoms or concerns to contact the clinic for sooner appointment she verbalized understanding.    Patient was given instructions and counseling regarding her condition or for health maintenance advice. Please see specific  information pulled into the AVS if appropriate.     Some of the data in this electronic note has been brought forward from a previous encounter, any necessary changes have been made, it has been reviewed by this APRN, and it is accurate.      This document has been electronically signed by SHARATH Cornell  August 28, 2023 08:30 EDT    Part of this note may be an electronic transcription/translation of spoken language to printed text using the Dragon Dictation System.

## 2023-09-06 ENCOUNTER — TELEPHONE (OUTPATIENT)
Dept: PSYCHIATRY | Facility: CLINIC | Age: 33
End: 2023-09-06
Payer: COMMERCIAL

## 2023-10-23 ENCOUNTER — TELEMEDICINE (OUTPATIENT)
Dept: PSYCHIATRY | Facility: CLINIC | Age: 33
End: 2023-10-23
Payer: COMMERCIAL

## 2023-10-23 DIAGNOSIS — F84.0 AUTISM SPECTRUM DISORDER: Chronic | ICD-10-CM

## 2023-10-23 DIAGNOSIS — F33.41 RECURRENT MAJOR DEPRESSIVE DISORDER, IN PARTIAL REMISSION: Chronic | ICD-10-CM

## 2023-10-23 DIAGNOSIS — F41.1 GENERALIZED ANXIETY DISORDER: Primary | Chronic | ICD-10-CM

## 2023-10-23 PROCEDURE — 99214 OFFICE O/P EST MOD 30 MIN: CPT | Performed by: NURSE PRACTITIONER

## 2023-10-23 RX ORDER — SERTRALINE HYDROCHLORIDE 100 MG/1
100 TABLET, FILM COATED ORAL DAILY
Qty: 90 TABLET | Refills: 0 | Status: SHIPPED | OUTPATIENT
Start: 2023-10-23

## 2023-10-23 NOTE — PROGRESS NOTES
[facility] provider [facility] [address] [place] [address].The Patient is seen remotely at home,  [place] [address] via [employer].  Patient is being seen via telehealth and confirm that they are in a secure environment for this session. The patient's condition being diagnosed/treated is appropriate for telemedicine. The provider identified himself/herself: herself as well as her credentials.   The patient gave consent to be seen remotely, and when consent is given they understand that the consent allows for patient identifiable information to be sent to a third party as needed.   They may refuse to be seen remotely at any time. The electronic data is encrypted and password protected, and the patient has been advised of the potential risks to privacy not withstanding such measures.    You have chosen to receive care through a telehealth visit.  Do you consent to use a video/audio connection for your medical care today? Yes. Patient verified Name, , and address.       Chief Complaint  Anxiety and depression     Subjective    Hoa Abril Lewis presents to [facility] medication management.     History of Present Illness   Patient presents today reporting she is doing good. Reports that things have been going well at work where to switched to 3rd shift. She feels less people she is doing better with her autism and less stimulated and using noise cancelling head phones which have been very helpful. She states after work she goes to the gym and then goes to bed. Reports she is sleeping and eating well. States she may get sad or anxious from time to time but reports it is manageable with no concerns. Denies any Side effects to medications. Rates depression and anxiety 2-3 on a scale of 0-10 with 10 being the worst. Denies any medical or medicine changes. Denies any other concerns. Denies any SI.HI...      Objective   Vital Signs:   There were no vitals taken for this visit.  Due to the remote nature of this encounter (virtual encounter), vitals were unable to be obtained.  Height stated at 67.9 inches.  Weight stated at 265 pounds.        PHQ-9 Score:   PHQ-9 Total Score:       Patient screened positive for depression based on a PHQ-9 score of  on . Follow-up recommendations include:  see notes and medication list .    PHQ-9 Depression Screening  Little interest or pleasure in doing things?     Feeling down, depressed, or hopeless?     Trouble falling or staying asleep, or sleeping too much?     Feeling tired or having little energy?     Poor appetite or overeating?     Feeling bad about yourself - or that you are a failure or have let yourself or your family down?     Trouble concentrating on things, such as reading the newspaper or watching television?     Moving or speaking so slowly that other people could have noticed? Or the opposite - being so fidgety or restless that you have been moving around a lot more than usual?     Thoughts that you would be better off dead, or of hurting yourself in some way?     PHQ-9 Total Score     If you checked off any problems, how difficult have these problems made it for you to do your work, take care of things at home, or get along with other people?         PHQ-9 Total Score:             PROMIS scale screening tool that patient filled out virtually reviewed by this APRN at today's encounter.      Mental Status Exam:   Hygiene:   good  Cooperation:  Cooperative  Eye Contact:  Fair  Psychomotor Behavior:  Restless  Affect:  Appropriate  Mood: normal  Speech:  Normal  Thought Process:  Goal directed  Thought Content:  Normal  Suicidal:  None  Homicidal:  None  Hallucinations:  None  Delusion:  None  Memory:  Intact  Orientation:  Person, Place, Time and Situation  Reliability:  good  Insight:  Good and Fair  Judgement:  Good and Fair  Impulse Control:  Good and  Fair  Physical/Medical Issues:  No      Current Medications:   Current Outpatient Medications   Medication Sig Dispense Refill    SEMAGLUTIDE-WEIGHT MANAGEMENT SC       sertraline (ZOLOFT) 100 MG tablet Take 1 tablet by mouth Daily. 90 tablet 0    buPROPion XL (WELLBUTRIN XL) 150 MG 24 hr tablet Take 1 tablet by mouth Every Morning. 90 tablet 1    multivitamin with minerals tablet tablet Take 1 tablet by mouth Daily.      norgestimate-ethinyl estradiol (Tri-Lo-Pat) 0.18/0.215/0.25 MG-25 MCG per tablet TAKE 1 TABLET BY MOUTH DAILY 84 tablet 1     No current facility-administered medications for this visit.       Physical Exam  Nursing note reviewed.   Constitutional:       Appearance: Normal appearance.   Neurological:      Mental Status: She is alert.   Psychiatric:         Attention and Perception: Attention and perception normal.         Mood and Affect: Mood and affect normal. Mood is not anxious or depressed.         Speech: Speech normal.         Behavior: Behavior is cooperative.         Thought Content: Thought content normal.         Cognition and Memory: Cognition and memory normal.         Judgment: Judgment normal.        Result Review :     The following data was reviewed by: SHARATH Cornell on 03/17/2022:  Common labs          12/19/2022    10:44 1/10/2023    09:55   Common Labs   Glucose 93     BUN 14     Creatinine 0.69     Sodium 137     Potassium 4.4     Chloride 101     Calcium 9.5     Total Protein 7.0     Albumin 4.30     Total Bilirubin 0.3     Alkaline Phosphatase 82     AST (SGOT) 11     ALT (SGPT) 14     WBC 11.22  8.51    Hemoglobin 11.7  11.8    Hematocrit 35.9  36.4    Platelets 336  350    Total Cholesterol 279     Triglycerides 375     HDL Cholesterol 58     LDL Cholesterol  151       CMP          12/19/2022    10:44   CMP   Glucose 93    BUN 14    Creatinine 0.69    Sodium 137    Potassium 4.4    Chloride 101    Calcium 9.5    Total Protein 7.0    Albumin 4.30    Globulin 2.7     Total Bilirubin 0.3    Alkaline Phosphatase 82    AST (SGOT) 11    ALT (SGPT) 14    BUN/Creatinine Ratio 20.3      CBC          12/19/2022    10:44 1/10/2023    09:55   CBC   WBC 11.22  8.51    RBC 4.35  4.35    Hemoglobin 11.7  11.8    Hematocrit 35.9  36.4    MCV 82.5  83.7    MCH 26.9  27.1    MCHC 32.6  32.4    RDW 13.9  13.7    Platelets 336  350      CBC w/diff      CBC w/Diff 8/27/21 9/7/21   WBC 8.50 7.15   RBC 4.09 4.41   Hemoglobin 10.8 (A) 11.6 (A)   Hematocrit 34.6 35.9   MCV 84.6 81.4   MCH 26.4 (A) 26.3 (A)   MCHC 31.2 (A) 32.3   RDW 13.9 13.5   Platelets 332 359   Neutrophil Rel % 58.8 54.7   Immature Granulocyte Rel % 0.7 (A) 0.6 (A)   Lymphocyte Rel % 32.1 35.2   Monocyte Rel % 5.8 6.7   Eosinophil Rel % 1.8 1.8   Basophil Rel % 0.8 1.0   (A) Abnormal value              Lipid Panel          12/19/2022    10:44   Lipid Panel   Total Cholesterol 279    Triglycerides 375    HDL Cholesterol 58    VLDL Cholesterol 70    LDL Cholesterol  151      TSH          12/19/2022    10:44   TSH   TSH 3.490      Electrolytes          12/19/2022    10:44   Electrolytes   Sodium 137    Potassium 4.4    Chloride 101    Calcium 9.5      Renal Profile          12/19/2022    10:44   Renal Profile   BUN 14    Creatinine 0.69            Data reviewed: PCP notes         Assessment and Plan    Problem List Items Addressed This Visit    None  Visit Diagnoses       Generalized anxiety disorder  (Chronic)   -  Primary    Relevant Medications    SEMAGLUTIDE-WEIGHT MANAGEMENT SC    sertraline (ZOLOFT) 100 MG tablet    Recurrent major depressive disorder, in partial remission  (Chronic)       Relevant Medications    SEMAGLUTIDE-WEIGHT MANAGEMENT SC    sertraline (ZOLOFT) 100 MG tablet    Autism spectrum disorder  (Chronic)       Relevant Medications    SEMAGLUTIDE-WEIGHT MANAGEMENT SC    sertraline (ZOLOFT) 100 MG tablet          TREATMENT PLAN/GOALS: Continue supportive psychotherapy efforts and medications as indicated.  Treatment and medication options discussed during today's visit. Patient ackowledged and verbally consented to continue with current treatment plan and was educated on the importance of compliance with treatment and follow-up appointments.    MEDICATION ISSUES:  We discussed risks, benefits, and side effects of the above medications and the patient was agreeable with the plan. Patient was educated on the importance of compliance with treatment and follow-up appointments.  Patient is agreeable to call the office with any worsening of symptoms or onset of side effects. Patient is agreeable to call 911 or go to the nearest ER should he/she begin having SI/HI. Patient was strongly encouraged to continue birth control.  Patient was counseled regarding need not to become pregnant prior to discussion and possible titration and discontinuation of medications.  An explanation was provided to the patient regarding the risk of fetal harm with psychotrophic medications.  Patient was provided education regarding both risk of continuing and discontinuing medications during pregnancy.  Patient verbalized understanding.        -Continue Wellbutrin 150 mg XL daily as adjunct for depression.  -Continue sertraline 100 mg for depressive and anxiety symptoms.   -Continue psychotherapy with Hoa Bettencourt.      Counseled patient regarding multimodal approach with healthy nutrition, healthy sleep, regular physical activity, social activities, counseling, and medications.      Coping skills reviewed and encouraged positive framing of thoughts     Assisted patient in processing above session content; acknowledged and normalized patient’s thoughts, feelings, and concerns.  Applied  positive coping skills and behavior management in session.  Allowed patient to freely discuss issues without interruption or judgment. Provided safe, confidential environment to facilitate the development of positive therapeutic relationship and encourage open,  honest communication. Assisted patient in identifying risk factors which would indicate the need for higher level of care including thoughts to harm self or others and/or self-harming behavior and encouraged patient to contact this office, call 911, or present to the nearest emergency room should any of these events occur. Discussed crisis intervention services and means to access.     MEDS ORDERED DURING VISIT:  New Medications Ordered This Visit   Medications    sertraline (ZOLOFT) 100 MG tablet     Sig: Take 1 tablet by mouth Daily.     Dispense:  90 tablet     Refill:  0         Follow Up   Return in about 8 weeks (around 12/18/2023), or if symptoms worsen or fail to improve, for Recheck.  Encouraged patient if she had any worsening symptoms or concerns to contact the clinic for sooner appointment she verbalized understanding.    Patient was given instructions and counseling regarding her condition or for health maintenance advice. Please see specific information pulled into the AVS if appropriate.     Some of the data in this electronic note has been brought forward from a previous encounter, any necessary changes have been made, it has been reviewed by this APRN, and it is accurate.      This document has been electronically signed by SHARATH Cornell  October 23, 2023 08:39 EDT    Part of this note may be an electronic transcription/translation of spoken language to printed text using the Dragon Dictation System.

## 2023-10-23 NOTE — TREATMENT PLAN
Multi-Disciplinary Problems (from Behavioral Health Treatment Plan)      Active Problems       Problem: Anxiety  Start Date: 10/23/23      Problem Details: The patient self-scales this problem as a 2-3 with 10 being the worst.          Goal Priority Start Date Expected End Date End Date    Patient will develop and implement behavioral and cognitive strategies to reduce anxiety and irrational fears. -- 10/23/23 -- --    Goal Details: Progress toward goal:  The patient self-scales their progress related to this goal as a 2 with 10 being the worst.          Goal Intervention Frequency Start Date End Date    Help patient explore past emotional issues in relation to present anxiety. Every 8 Weeks 10/23/23 --    Intervention Details: Duration of treatment until until discharged.          Goal Intervention Frequency Start Date End Date    Help patient develop an awareness of their cognitive and physical responses to anxiety. Every 8 Weeks 10/23/23 --    Intervention Details: Duration of treatment until until discharged.                  Problem: Depression  Start Date: 10/23/23      Problem Details: The patient self-scales this problem as a 2-3 with 10 being the worst.          Goal Priority Start Date Expected End Date End Date    Patient will demonstrate the ability to initiate new constructive life skills outside of sessions on a consistent basis. -- 10/23/23 -- --    Goal Details: Progress toward goal:  The patient self-scales their progress related to this goal as a 2-3 with 10 being the worst.          Goal Intervention Frequency Start Date End Date    Assist patient in setting attainable activities of daily living goals. PRN 10/23/23 --      Goal Intervention Frequency Start Date End Date    Provide education about depression Every 8 Weeks 10/23/23 --    Intervention Details: Duration of treatment until until discharged.          Goal Intervention Frequency Start Date End Date    Assist patient in developing healthy  coping strategies. Every 8 Weeks 10/23/23 --    Intervention Details: Duration of treatment until until discharged.                          Reviewed By       Suzie Fuentes APRN 10/23/23 0824    Suzie Fuentes APRN 10/23/23 0822                     I have discussed and reviewed this treatment plan with the patient.

## 2023-11-06 DIAGNOSIS — F33.41 RECURRENT MAJOR DEPRESSIVE DISORDER, IN PARTIAL REMISSION: ICD-10-CM

## 2023-11-29 ENCOUNTER — TELEMEDICINE (OUTPATIENT)
Dept: PSYCHIATRY | Facility: CLINIC | Age: 33
End: 2023-11-29
Payer: COMMERCIAL

## 2023-11-29 DIAGNOSIS — F43.21 GRIEF: Primary | ICD-10-CM

## 2023-11-29 PROCEDURE — 90832 PSYTX W PT 30 MINUTES: CPT | Performed by: COUNSELOR

## 2023-11-29 NOTE — PROGRESS NOTES
"Date: November 29, 2023  Time In: 0830  Time Out: 0854  This provider is located at home address for [facility] [address], [place] [address] using a secure [employer] Video Visit through [employer]. Patient is being seen remotely via telehealth at home address in [place] and stated they are in a secure environment for this session. The patient's condition being diagnosed/treated is appropriate for telemedicine. The provider identified herself as well as her credentials. The patient, and/or patients guardian, consent to be seen remotely, and when consent is given they understand that the consent allows for patient identifiable information to be sent to a third party as needed. They may refuse to be seen remotely at any time. The electronic data is encrypted and password protected, and the patient and/or guardian has been advised of the potential risks to privacy not withstanding such measures.     You have chosen to receive care through a telehealth visit.  Do you consent to use a video/audio connection for your medical care today? Yes    PROGRESS NOTE  Data:  Hoa Lewis is a 33 y.o. female who presents today for follow up    Chief Complaint: grief    History of Present Illness: Pt reports that her father unexpectedly passed away at the beginning of this month. Pt reports this event as \"surreal\". Pt reports that she has not been able to cry other than a few tears. Pt reports that she finds herself forgetting that her father is no longer with her explaining that it does not feel like this is reality but more like a foggy daydream. Pt reports that she is worried that this loss will send her into a deep depression and is trying to stay proactive in efforts to prevent that from happening.       Clinical Maneuvering/Intervention:    (Scales based on 0 - 10 with 10 being the worst)  Depression: 10 Anxiety: 10       Assisted patient in " processing above session content; acknowledged and normalized patient’s thoughts, feelings, and concerns.  Rationalized patient thought process regarding recent stressors and life events. Discussed triggers associated with patient's emotions. Also discussed coping skills for patient to implement. Discussed grief and death. Discussed conflicting emotions being felt at the same time. Assisted with processing thoughts and emotions.     Allowed patient to freely discuss issues without interruption or judgment. Provided safe, confidential environment to facilitate the development of positive therapeutic relationship and encourage open, honest communication. Assisted patient in identifying risk factors which would indicate the need for higher level of care including thoughts to harm self or others and/or self-harming behavior and encouraged patient to contact this office, call 911, or present to the nearest emergency room should any of these events occur. Discussed crisis intervention services and means to access. Patient adamantly and convincingly denies current suicidal or homicidal ideation or perceptual disturbance.    Assessment:   Assessment   Patient appears to maintain relative stability as compared to their baseline.  However, patient continues to struggle with grief which continues to cause impairment in important areas of functioning.  A result, they can be reasonably expected to continue to benefit from treatment and would likely be at increased risk for decompensation otherwise.    Mental Status Exam:   Hygiene:   good  Cooperation:  Cooperative  Eye Contact:  Good  Psychomotor Behavior:  Appropriate  Affect:   tearful  Mood: sad, depressed, and anxious  Speech:  Normal  Thought Process:  Linear  Thought Content:  Mood congruent  Suicidal:  None  Homicidal:  None  Hallucinations:  None  Delusion:  None  Memory:  Intact  Orientation:  Person, Place, Time and Situation  Reliability:  fair  Insight:   Fair  Judgement:  Fair  Impulse Control:  Fair  Physical/Medical Issues:  No        Patient's Support Network Includes:  mother and extended family    Functional Status: Mild impairment     Progress toward goal: Not at goal    Prognosis: Fair with Ongoing Treatment            Plan:    Patient will continue in individual outpatient therapy with focus on improved functioning and coping skills, maintaining stability, and avoiding decompensation and the need for higher level of care.    Patient will adhere to medication regimen as prescribed and report any side effects. Patient will contact this office, call 911 or present to the nearest emergency room should suicidal or homicidal ideations occur. Provide Cognitive Behavioral Therapy and Solution Focused Therapy to improve functioning, maintain stability, and avoid decompensation and the need for higher level of care.     Return in about 4 weeks, or earlier if symptoms worsen or fail to improve.           VISIT DIAGNOSIS:     ICD-10-CM ICD-9-CM   1. Grief  F43.21 309.0        Diagnoses and all orders for this visit:    1. Grief (Primary)           [facility] No Show Policy:  We understand unexpected circumstances arise; however, anytime you miss your appointment we are unable to provide you appropriate care.  In addition, each appointment missed could have been used to provide care for others.  We ask that you call at least 24 hours in advance to cancel or reschedule an appointment.  We would like to take this opportunity to remind you of our policy stating patients who miss THREE or more appointments without cancelling or rescheduling 24 hours in advance of the appointment may be subject to cancellation of any further visits with our clinic and recommendation to seek in-person services/visits.    Please call [phone] to reschedule your appointment. If there are reasons that make it difficult for you to keep the appointments, please call and  let us know how we can help.  Please understand that medication prescribing will not continue without seeing your provider.      [facility]'s No Show Policy reviewed with patient at today's visit. Patient verbalized understanding of this policy. Discussed with patient that in the event that there are three or more no show visits, it will be recommended that they pursue in-person services/visits as noncompliance with telehealth visits indicates that patient is not an appropriate candidate for telemedicine and would likely be more appropriate for in-person services/visits. Patient verbalizes understanding and is agreeable to this.        This document has been electronically signed by Hoa Bettencourt LCSW  November 29, 2023 09:10 EST      Part of this note may be an electronic transcription/translation of spoken language to printed text using the Dragon Dictation System.

## 2023-12-19 ENCOUNTER — TELEMEDICINE (OUTPATIENT)
Dept: PSYCHIATRY | Facility: CLINIC | Age: 33
End: 2023-12-19

## 2023-12-19 DIAGNOSIS — F41.1 GENERALIZED ANXIETY DISORDER: Chronic | ICD-10-CM

## 2023-12-19 DIAGNOSIS — F33.41 RECURRENT MAJOR DEPRESSIVE DISORDER, IN PARTIAL REMISSION: ICD-10-CM

## 2023-12-19 DIAGNOSIS — F84.0 AUTISM SPECTRUM DISORDER: ICD-10-CM

## 2023-12-19 DIAGNOSIS — F43.21 GRIEF: Primary | ICD-10-CM

## 2023-12-19 PROCEDURE — 99214 OFFICE O/P EST MOD 30 MIN: CPT | Performed by: NURSE PRACTITIONER

## 2023-12-19 RX ORDER — SERTRALINE HYDROCHLORIDE 100 MG/1
100 TABLET, FILM COATED ORAL DAILY
Qty: 90 TABLET | Refills: 0 | Status: SHIPPED | OUTPATIENT
Start: 2023-12-19

## 2023-12-19 RX ORDER — BUPROPION HYDROCHLORIDE 300 MG/1
300 TABLET ORAL EVERY MORNING
Qty: 90 TABLET | Refills: 0 | Status: SHIPPED | OUTPATIENT
Start: 2023-12-19

## 2023-12-19 RX ORDER — BUPROPION HYDROCHLORIDE 150 MG/1
150 TABLET ORAL EVERY MORNING
Qty: 90 TABLET | Refills: 1 | OUTPATIENT
Start: 2023-12-19

## 2023-12-19 NOTE — PROGRESS NOTES
[facility] provider [facility] located at [facility], [address] [place] [address].The Patient is seen remotely at home,  [place] [address] [facility]  Patient is being seen via telehealth and confirm that they are in a secure environment for this session. The patient's condition being diagnosed/treated is appropriate for telemedicine. The provider identified himself/herself: herself as well as her credentials.   The patient gave consent to be seen remotely, and when consent is given they understand that the consent allows for patient identifiable information to be sent to a third party as needed.   They may refuse to be seen remotely at any time. The electronic data is encrypted and password protected, and the patient has been advised of the potential risks to privacy not withstanding such measures.    You have chosen to receive care through a telehealth visit.  Do you consent to use a video/audio connection for your medical care today? Yes. Patient verified Name, , and address.       Chief Complaint  Anxiety and depression and grief     Subjective    Hoa Abril Lewis presents to [facility] medication management.     History of Present Illness   Patient presents today reporting that her dad passed away on .  She states she has had a therapy session since then.  She reports that it was very sudden as her father had a longstanding substance abuse history but had an accidental overdose with fentanyl.  Patient states that he also had other health issues including diabetes so that was very difficult on him.  She states because of this she is trying to take better care of herself.  Reports that she is dealing with things 1 day at a time.  She notes it has been difficult this time of year and Thanksgiving was hard not having him around.  She notes she is also worried about her mother as she is not in therapy due to  financial issues.  Gave the patient information on some cheaper therapy options and support groups.  Patient states that for sleep and appetite had decreased now it is improving.  Notes that she is getting at least 7 to 8 hours sometimes more.  Reports work is going well as she has a good support system with coworkers and family as well as her pets.  Denies any side effects to medications.  Denies any SI/HI/AH/VH.    Objective   Vital Signs:   There were no vitals taken for this visit.  Due to the remote nature of this encounter (virtual encounter), vitals were unable to be obtained.  Height stated at 67.9 inches.  Weight stated at 265 pounds.        PHQ-9 Score:   PHQ-9 Total Score:       Patient screened positive for depression based on a PHQ-9 score of  on . Follow-up recommendations include:  see notes and medication list .    PHQ-9 Depression Screening  Little interest or pleasure in doing things?     Feeling down, depressed, or hopeless?     Trouble falling or staying asleep, or sleeping too much?     Feeling tired or having little energy?     Poor appetite or overeating?     Feeling bad about yourself - or that you are a failure or have let yourself or your family down?     Trouble concentrating on things, such as reading the newspaper or watching television?     Moving or speaking so slowly that other people could have noticed? Or the opposite - being so fidgety or restless that you have been moving around a lot more than usual?     Thoughts that you would be better off dead, or of hurting yourself in some way?     PHQ-9 Total Score     If you checked off any problems, how difficult have these problems made it for you to do your work, take care of things at home, or get along with other people?         PHQ-9 Total Score:             PROMIS scale screening tool that patient filled out virtually reviewed by this APRN at today's encounter.      Mental Status Exam:   Hygiene:   good  Cooperation:   Cooperative  Eye Contact:  Fair  Psychomotor Behavior:  Restless  Affect:  Appropriate  Mood: sad  Speech:  Normal  Thought Process:  Goal directed  Thought Content:  Normal  Suicidal:  None  Homicidal:  None  Hallucinations:  None  Delusion:  None  Memory:  Intact  Orientation:  Person, Place, Time and Situation  Reliability:  good  Insight:  Good and Fair  Judgement:  Good and Fair  Impulse Control:  Good and Fair  Physical/Medical Issues:  No      Current Medications:   Current Outpatient Medications   Medication Sig Dispense Refill    buPROPion XL (WELLBUTRIN XL) 300 MG 24 hr tablet Take 1 tablet by mouth Every Morning. 90 tablet 0    sertraline (ZOLOFT) 100 MG tablet Take 1 tablet by mouth Daily. 90 tablet 0    multivitamin with minerals tablet tablet Take 1 tablet by mouth Daily.      norgestimate-ethinyl estradiol (Tri-Lo-Pat) 0.18/0.215/0.25 MG-25 MCG per tablet TAKE 1 TABLET BY MOUTH DAILY 84 tablet 1    SEMAGLUTIDE-WEIGHT MANAGEMENT SC        No current facility-administered medications for this visit.       Physical Exam  Nursing note reviewed.   Constitutional:       Appearance: Normal appearance.   Neurological:      Mental Status: She is alert.   Psychiatric:         Attention and Perception: Attention and perception normal.         Mood and Affect: Mood normal. Mood is depressed. Mood is not anxious. Affect is tearful.         Speech: Speech normal.         Behavior: Behavior is cooperative.         Thought Content: Thought content normal.         Cognition and Memory: Cognition and memory normal.         Judgment: Judgment normal.        Result Review :     The following data was reviewed by: SHARATH Cornell on 03/17/2022:  Common labs          1/10/2023    09:55   Common Labs   WBC 8.51    Hemoglobin 11.8    Hematocrit 36.4    Platelets 350          CBC          1/10/2023    09:55   CBC   WBC 8.51    RBC 4.35    Hemoglobin 11.8    Hematocrit 36.4    MCV 83.7    MCH 27.1    MCHC 32.4    RDW  13.7    Platelets 350      CBC w/diff      CBC w/Diff 8/27/21 9/7/21   WBC 8.50 7.15   RBC 4.09 4.41   Hemoglobin 10.8 (A) 11.6 (A)   Hematocrit 34.6 35.9   MCV 84.6 81.4   MCH 26.4 (A) 26.3 (A)   MCHC 31.2 (A) 32.3   RDW 13.9 13.5   Platelets 332 359   Neutrophil Rel % 58.8 54.7   Immature Granulocyte Rel % 0.7 (A) 0.6 (A)   Lymphocyte Rel % 32.1 35.2   Monocyte Rel % 5.8 6.7   Eosinophil Rel % 1.8 1.8   Basophil Rel % 0.8 1.0   (A) Abnormal value                                    Data reviewed: PCP notes         Assessment and Plan    Problem List Items Addressed This Visit    None  Visit Diagnoses       Grief    -  Primary    Relevant Medications    buPROPion XL (WELLBUTRIN XL) 300 MG 24 hr tablet    Recurrent major depressive disorder, in partial remission        Relevant Medications    buPROPion XL (WELLBUTRIN XL) 300 MG 24 hr tablet    sertraline (ZOLOFT) 100 MG tablet    Generalized anxiety disorder  (Chronic)       Relevant Medications    buPROPion XL (WELLBUTRIN XL) 300 MG 24 hr tablet    sertraline (ZOLOFT) 100 MG tablet    Autism spectrum disorder        Relevant Medications    buPROPion XL (WELLBUTRIN XL) 300 MG 24 hr tablet    sertraline (ZOLOFT) 100 MG tablet            TREATMENT PLAN/GOALS: Continue supportive psychotherapy efforts and medications as indicated. Treatment and medication options discussed during today's visit. Patient ackowledged and verbally consented to continue with current treatment plan and was educated on the importance of compliance with treatment and follow-up appointments.    MEDICATION ISSUES:  We discussed risks, benefits, and side effects of the above medications and the patient was agreeable with the plan. Patient was educated on the importance of compliance with treatment and follow-up appointments.  Patient is agreeable to call the office with any worsening of symptoms or onset of side effects. Patient is agreeable to call 911 or go to the nearest ER should he/she begin  having SI/HI. Patient was strongly encouraged to continue birth control.  Patient was counseled regarding need not to become pregnant prior to discussion and possible titration and discontinuation of medications.  An explanation was provided to the patient regarding the risk of fetal harm with psychotrophic medications.  Patient was provided education regarding both risk of continuing and discontinuing medications during pregnancy.  Patient verbalized understanding.        -Increase Wellbutrin to 300 mg XL daily as adjunct for depression due to grief with the loss of her father.  Highly encouraged patient if she had any worsening symptoms or concerns to contact the clinic for sooner appointment she verbalized understanding.  -Continue sertraline 100 mg for depressive and anxiety symptoms.   -Continue psychotherapy with Hoa Bettencourt.      Counseled patient regarding multimodal approach with healthy nutrition, healthy sleep, regular physical activity, social activities, counseling, and medications.      Coping skills reviewed and encouraged positive framing of thoughts     Assisted patient in processing above session content; acknowledged and normalized patient’s thoughts, feelings, and concerns.  Applied  positive coping skills and behavior management in session.  Allowed patient to freely discuss issues without interruption or judgment. Provided safe, confidential environment to facilitate the development of positive therapeutic relationship and encourage open, honest communication. Assisted patient in identifying risk factors which would indicate the need for higher level of care including thoughts to harm self or others and/or self-harming behavior and encouraged patient to contact this office, call 911, or present to the nearest emergency room should any of these events occur. Discussed crisis intervention services and means to access.     MEDS ORDERED DURING VISIT:  New Medications Ordered This Visit    Medications    buPROPion XL (WELLBUTRIN XL) 300 MG 24 hr tablet     Sig: Take 1 tablet by mouth Every Morning.     Dispense:  90 tablet     Refill:  0    sertraline (ZOLOFT) 100 MG tablet     Sig: Take 1 tablet by mouth Daily.     Dispense:  90 tablet     Refill:  0         Follow Up   Return in about 4 weeks (around 1/16/2024), or if symptoms worsen or fail to improve, for Recheck.  Encouraged patient if she had any worsening symptoms or concerns to contact the clinic for sooner appointment she verbalized understanding.    Patient was given instructions and counseling regarding her condition or for health maintenance advice. Please see specific information pulled into the AVS if appropriate.     Some of the data in this electronic note has been brought forward from a previous encounter, any necessary changes have been made, it has been reviewed by this APRN, and it is accurate.      This document has been electronically signed by SHARATH Cornell  December 19, 2023 08:25 EST    Part of this note may be an electronic transcription/translation of spoken language to printed text using the Dragon Dictation System.

## 2023-12-29 ENCOUNTER — TELEMEDICINE (OUTPATIENT)
Dept: PSYCHIATRY | Facility: CLINIC | Age: 33
End: 2023-12-29

## 2023-12-29 DIAGNOSIS — F33.41 RECURRENT MAJOR DEPRESSIVE DISORDER, IN PARTIAL REMISSION: ICD-10-CM

## 2023-12-29 DIAGNOSIS — F43.21 GRIEF: Primary | ICD-10-CM

## 2023-12-29 PROCEDURE — 90832 PSYTX W PT 30 MINUTES: CPT | Performed by: COUNSELOR

## 2023-12-29 NOTE — PROGRESS NOTES
Date: 2023  Time In: 830  Time Out: 846  This provider is located at home address for [facility] (through [facility] [address], [place] [address] using a secure [employer] Visit through [employer]. Patient is being seen remotely via telehealth at home address in [place] and stated they are in a secure environment for this session. The patient's condition being diagnosed/treated is appropriate for telemedicine. The provider identified herself as well as her credentials. The patient, and/or patients guardian, consent to be seen remotely, and when consent is given they understand that the consent allows for patient identifiable information to be sent to a third party as needed. They may refuse to be seen remotely at any time. The electronic data is encrypted and password protected, and the patient and/or guardian has been advised of the potential risks to privacy not withstanding such measures.     You have chosen to receive care through a telehealth visit.  Do you consent to use a video/audio connection for your medical care today? Yes    PROGRESS NOTE  Data:  Hoa Lewis is a 33 y.o. female who presents today for follow up    Chief Complaint: grief     History of Present Illness: Pt discussed spending the  holiday together as a family. Pt reports that this [place] felt different without her dad being present. Pt reports that she finds herself wanting to tell her dad something and will forget that he is now . Pt expressed concerns with her mother; Pt reports advocating for therapy or support groups but understands that this is ultimately her mother's decision to attend making this concern outside of her control which causes her to feel a sense of helplessness. Pt reports gratitude towards her work family and their support.       Clinical Maneuvering/Intervention:    (Scales based on 0 - 10 with 10 being the  worst)  Depression: 5 Anxiety: 5       Assisted patient in processing above session content; acknowledged and normalized patient’s thoughts, feelings, and concerns.  Rationalized patient thought process regarding recent stressors and life events. Discussed triggers associated with patient's emotions. Also discussed coping skills for patient to implement. Discussed loss of father and how sometimes she will forget that she can not tell him talk to him. Encouraged a journal for these times which will allow a type of outlet for these thoughts and desires.    Allowed patient to freely discuss issues without interruption or judgment. Provided safe, confidential environment to facilitate the development of positive therapeutic relationship and encourage open, honest communication. Assisted patient in identifying risk factors which would indicate the need for higher level of care including thoughts to harm self or others and/or self-harming behavior and encouraged patient to contact this office, call 911, or present to the nearest emergency room should any of these events occur. Discussed crisis intervention services and means to access. Patient adamantly and convincingly denies current suicidal or homicidal ideation or perceptual disturbance.    Assessment:   Assessment   Patient appears to maintain relative stability as compared to their baseline.  However, patient continues to struggle with grief and depression which continues to cause impairment in important areas of functioning.  A result, they can be reasonably expected to continue to benefit from treatment and would likely be at increased risk for decompensation otherwise.    Mental Status Exam:   Hygiene:   good  Cooperation:  Cooperative  Eye Contact:  Good  Psychomotor Behavior:  Appropriate  Affect:  Appropriate  Mood: sad  Speech:  Normal  Thought Process:  Linear  Thought Content:  Mood congruent  Suicidal:  None  Homicidal:  None  Hallucinations:   None  Delusion:  None  Memory:  Intact  Orientation:  Person, Place, Time and Situation  Reliability:  fair  Insight:  Fair  Judgement:  Fair  Impulse Control:  Fair  Physical/Medical Issues:  No        Patient's Support Network Includes:   coworkers    Functional Status: Mild impairment     Progress toward goal: Not at goal    Prognosis: Fair with Ongoing Treatment            Plan:    Patient will continue in individual outpatient therapy with focus on improved functioning and coping skills, maintaining stability, and avoiding decompensation and the need for higher level of care.    Patient will adhere to medication regimen as prescribed and report any side effects. Patient will contact this office, call 911 or present to the nearest emergency room should suicidal or homicidal ideations occur. Provide Cognitive Behavioral Therapy and Solution Focused Therapy to improve functioning, maintain stability, and avoid decompensation and the need for higher level of care.     Return in about 4 weeks, or earlier if symptoms worsen or fail to improve.           VISIT DIAGNOSIS:     ICD-10-CM ICD-9-CM   1. Grief  F43.21 309.0   2. Recurrent major depressive disorder, in partial remission  F33.41 296.35        Diagnoses and all orders for this visit:    1. Grief (Primary)    2. Recurrent major depressive disorder, in partial remission           [facility] No Show Policy:  We understand unexpected circumstances arise; however, anytime you miss your appointment we are unable to provide you appropriate care.  In addition, each appointment missed could have been used to provide care for others.  We ask that you call at least 24 hours in advance to cancel or reschedule an appointment.  We would like to take this opportunity to remind you of our policy stating patients who miss THREE or more appointments without cancelling or rescheduling 24 hours in advance of the appointment may be subject to cancellation  of any further visits with our clinic and recommendation to seek in-person services/visits.    Please call [phone] to reschedule your appointment. If there are reasons that make it difficult for you to keep the appointments, please call and let us know how we can help.  Please understand that medication prescribing will not continue without seeing your provider.      [facility]'s No Show Policy reviewed with patient at today's visit. Patient verbalized understanding of this policy. Discussed with patient that in the event that there are three or more no show visits, it will be recommended that they pursue in-person services/visits as noncompliance with telehealth visits indicates that patient is not an appropriate candidate for telemedicine and would likely be more appropriate for in-person services/visits. Patient verbalizes understanding and is agreeable to this.        This document has been electronically signed by Hoa Garcia LCSW.  December 29, 2023 08:49 EST      Part of this note may be an electronic transcription/translation of spoken language to printed text using the Dragon Dictation System.

## 2024-01-17 ENCOUNTER — TELEMEDICINE (OUTPATIENT)
Dept: PSYCHIATRY | Facility: CLINIC | Age: 34
End: 2024-01-17
Payer: COMMERCIAL

## 2024-01-17 DIAGNOSIS — F41.1 GENERALIZED ANXIETY DISORDER: ICD-10-CM

## 2024-01-17 DIAGNOSIS — F33.0 MILD EPISODE OF RECURRENT MAJOR DEPRESSIVE DISORDER: ICD-10-CM

## 2024-01-17 DIAGNOSIS — F43.21 GRIEF: Primary | ICD-10-CM

## 2024-01-17 DIAGNOSIS — F84.0 AUTISM SPECTRUM DISORDER: ICD-10-CM

## 2024-01-17 PROCEDURE — 99214 OFFICE O/P EST MOD 30 MIN: CPT | Performed by: NURSE PRACTITIONER

## 2024-01-17 NOTE — PROGRESS NOTES
[facility] provider [facility], [address] [place] [address].The Patient is seen remotely at home,  [place] [address] via [employer].  Patient is being seen via telehealth and confirm that they are in a secure environment for this session. The patient's condition being diagnosed/treated is appropriate for telemedicine. The provider identified himself/herself: herself as well as her credentials.   The patient gave consent to be seen remotely, and when consent is given they understand that the consent allows for patient identifiable information to be sent to a third party as needed.   They may refuse to be seen remotely at any time. The electronic data is encrypted and password protected, and the patient has been advised of the potential risks to privacy not withstanding such measures.    You have chosen to receive care through a telehealth visit.  Do you consent to use a video/audio connection for your medical care today? Yes. Patient verified Name, , and address.       Chief Complaint  Anxiety and depression and grief     Subjective    Hoa Abril Lewis presents to [facility] medication management.     History of Present Illness   Patient states that she is doing ok as she is still in the grieving process of losing her father. She states some days and times are better than others and some are more difficult. She notes that certain music she still can't listen to. She reports she is working with her therapist. States that the increase in medication was helpful as she has not been as depressed but still feels she is processing the loss of her father. Patient states she is sleeping well. Appetite is unchanged. She states work is busy which helps. Denies any side effects to medications. See PHQ-9 and DERRICK-7. Patient denies any concerns at this time feels she is just going through the grief process. Encouraged her  to call the office if she needs more frequent therapy appointments. Denies any SI.HI. AH.VH.     Objective   Vital Signs:   There were no vitals taken for this visit.  Due to the remote nature of this encounter (virtual encounter), vitals were unable to be obtained.  Height stated at 67.9 inches.  Weight stated at 265 pounds.        PHQ-9 Score:   PHQ-9 Total Score:       Patient screened positive for depression based on a PHQ-9 score of  on . Follow-up recommendations include:  see notes and medication list .    PHQ-9 Depression Screening  Little interest or pleasure in doing things? (P) 1-->several days   Feeling down, depressed, or hopeless? (P) 1-->several days   Trouble falling or staying asleep, or sleeping too much? (P) 1-->several days   Feeling tired or having little energy? (P) 1-->several days   Poor appetite or overeating? (P) 0-->not at all   Feeling bad about yourself - or that you are a failure or have let yourself or your family down? (P) 0-->not at all   Trouble concentrating on things, such as reading the newspaper or watching television? (P) 1-->several days   Moving or speaking so slowly that other people could have noticed? Or the opposite - being so fidgety or restless that you have been moving around a lot more than usual? (P) 0-->not at all   Thoughts that you would be better off dead, or of hurting yourself in some way? (P) 0-->not at all   PHQ-9 Total Score (P) 5   If you checked off any problems, how difficult have these problems made it for you to do your work, take care of things at home, or get along with other people? (P) somewhat difficult       PHQ-9 Total Score: (P) 5    Feeling nervous, anxious or on edge: (P) Several days  Not being able to stop or control worrying: (P) Several days  Worrying too much about different things: (P) Several days  Trouble Relaxing: (P) Not at all  Being so restless that it is hard to sit still: (P) Not at all  Feeling afraid as if something awful might  happen: (P) Not at all  Becoming easily annoyed or irritable: (P) Several days  DERRICK 7 Total Score: (P) 4  If you checked any problems, how difficult have these problems made it for you to do your work, take care of things at home, or get along with other people: (P) Somewhat difficult      PROMIS scale screening tool that patient filled out virtually reviewed by this APRN at today's encounter.      Mental Status Exam:   Hygiene:   good  Cooperation:  Cooperative  Eye Contact:  Fair  Psychomotor Behavior:  Restless  Affect:  Appropriate  Mood: sad  Speech:  Normal  Thought Process:  Goal directed  Thought Content:  Normal  Suicidal:  None  Homicidal:  None  Hallucinations:  None  Delusion:  None  Memory:  Intact  Orientation:  Person, Place, Time and Situation  Reliability:  good  Insight:  Good and Fair  Judgement:  Good and Fair  Impulse Control:  Good and Fair  Physical/Medical Issues:  No      Current Medications:   Current Outpatient Medications   Medication Sig Dispense Refill    buPROPion XL (WELLBUTRIN XL) 300 MG 24 hr tablet Take 1 tablet by mouth Every Morning. 90 tablet 0    multivitamin with minerals tablet tablet Take 1 tablet by mouth Daily.      norgestimate-ethinyl estradiol (Tri-Lo-Pat) 0.18/0.215/0.25 MG-25 MCG per tablet TAKE 1 TABLET BY MOUTH DAILY 84 tablet 1    SEMAGLUTIDE-WEIGHT MANAGEMENT SC       sertraline (ZOLOFT) 100 MG tablet Take 1 tablet by mouth Daily. 90 tablet 0     No current facility-administered medications for this visit.       Physical Exam  Nursing note reviewed.   Constitutional:       Appearance: Normal appearance.   Neurological:      Mental Status: She is alert.   Psychiatric:         Attention and Perception: Attention and perception normal.         Mood and Affect: Mood normal. Mood is depressed. Mood is not anxious. Affect is tearful.         Speech: Speech normal.         Behavior: Behavior is cooperative.         Thought Content: Thought content normal.          Cognition and Memory: Cognition and memory normal.         Judgment: Judgment normal.        Result Review :     The following data was reviewed by: SHARATH Cornell on 03/17/2022:              CBC w/diff      CBC w/Diff 8/27/21 9/7/21   WBC 8.50 7.15   RBC 4.09 4.41   Hemoglobin 10.8 (A) 11.6 (A)   Hematocrit 34.6 35.9   MCV 84.6 81.4   MCH 26.4 (A) 26.3 (A)   MCHC 31.2 (A) 32.3   RDW 13.9 13.5   Platelets 332 359   Neutrophil Rel % 58.8 54.7   Immature Granulocyte Rel % 0.7 (A) 0.6 (A)   Lymphocyte Rel % 32.1 35.2   Monocyte Rel % 5.8 6.7   Eosinophil Rel % 1.8 1.8   Basophil Rel % 0.8 1.0   (A) Abnormal value                                    Data reviewed: PCP notes         Assessment and Plan    Problem List Items Addressed This Visit    None  Visit Diagnoses       Grief    -  Primary    Generalized anxiety disorder        Autism spectrum disorder        Mild episode of recurrent major depressive disorder                  TREATMENT PLAN/GOALS: Continue supportive psychotherapy efforts and medications as indicated. Treatment and medication options discussed during today's visit. Patient ackowledged and verbally consented to continue with current treatment plan and was educated on the importance of compliance with treatment and follow-up appointments.    MEDICATION ISSUES:  We discussed risks, benefits, and side effects of the above medications and the patient was agreeable with the plan. Patient was educated on the importance of compliance with treatment and follow-up appointments.  Patient is agreeable to call the office with any worsening of symptoms or onset of side effects. Patient is agreeable to call 911 or go to the nearest ER should he/she begin having SI/HI. Patient was strongly encouraged to continue birth control.  Patient was counseled regarding need not to become pregnant prior to discussion and possible titration and discontinuation of medications.  An explanation was provided to the patient  regarding the risk of fetal harm with psychotrophic medications.  Patient was provided education regarding both risk of continuing and discontinuing medications during pregnancy.  Patient verbalized understanding.        -Continue Wellbutrin 300 mg XL daily as adjunct for depression due to grief with the loss of her father.  Highly encouraged patient if she had any worsening symptoms or concerns to contact the clinic for sooner appointment she verbalized understanding.  -Continue sertraline 100 mg for depressive and anxiety symptoms.   -Continue psychotherapy with Hoa Bettencourt. Encouraged patient if she needs more frequent appointments to call the clinic she verbalized understanding.       Counseled patient regarding multimodal approach with healthy nutrition, healthy sleep, regular physical activity, social activities, counseling, and medications.      Coping skills reviewed and encouraged positive framing of thoughts     Assisted patient in processing above session content; acknowledged and normalized patient’s thoughts, feelings, and concerns.  Applied  positive coping skills and behavior management in session.  Allowed patient to freely discuss issues without interruption or judgment. Provided safe, confidential environment to facilitate the development of positive therapeutic relationship and encourage open, honest communication. Assisted patient in identifying risk factors which would indicate the need for higher level of care including thoughts to harm self or others and/or self-harming behavior and encouraged patient to contact this office, call 911, or present to the nearest emergency room should any of these events occur. Discussed crisis intervention services and means to access.     MEDS ORDERED DURING VISIT:  No orders of the defined types were placed in this encounter.  90 day supply sent in last visit.       Follow Up   Return in about 6 weeks (around 2/28/2024), or if symptoms worsen or fail to  improve, for Recheck.  Encouraged patient if she had any worsening symptoms or concerns to contact the clinic for sooner appointment she verbalized understanding.    Patient was given instructions and counseling regarding her condition or for health maintenance advice. Please see specific information pulled into the AVS if appropriate.     Some of the data in this electronic note has been brought forward from a previous encounter, any necessary changes have been made, it has been reviewed by this APRN, and it is accurate.      This document has been electronically signed by SHARATH Cornell  January 17, 2024 08:16 EST    Part of this note may be an electronic transcription/translation of spoken language to printed text using the Dragon Dictation System.

## 2024-01-31 ENCOUNTER — TELEMEDICINE (OUTPATIENT)
Dept: PSYCHIATRY | Facility: CLINIC | Age: 34
End: 2024-01-31
Payer: COMMERCIAL

## 2024-01-31 DIAGNOSIS — F41.1 GENERALIZED ANXIETY DISORDER: ICD-10-CM

## 2024-01-31 DIAGNOSIS — F43.21 GRIEF: Primary | ICD-10-CM

## 2024-01-31 PROCEDURE — 90832 PSYTX W PT 30 MINUTES: CPT | Performed by: COUNSELOR

## 2024-01-31 NOTE — LETTER
[facility]  [address] [place] [address]  [phone]  Dept: [phone]    24    RE:   Patient Name:  Hoa Lewis   :  1990      To whom it may concern:    Hoa is my patient, and has been under my care for grief, anxiety, and depression. I am familiar with her history and with the functional limitations imposed by her mental health.  She meets the definition of disability under the Americans with Disabilities Act, the Fair Housing Act, and the Rehabilitation Act of 1973.     Due to mental illness, Hoa has certain limitations and barriers.  In order to help alleviate these difficulties, and to enhance her ability to live independently and to fully use and enjoy the dwelling unit you own and/or administer, I am issuing an emotional support animal that will assist Hoa in coping with her disability.    I would be happy to answer other questions you may have concerning my recommendation that Hoa have an emotional support animal.  Should you have additional questions, please do not hesitate to contact me.    Sincerely,  Hoa Garcia, MSSW, LCSW

## 2024-01-31 NOTE — PROGRESS NOTES
Date: January 31, 2024  Time In: 0830  Time Out: 0846  This provider is located at home address for [facility] [address], [place] [address] using a secure [employer] Video Visit through [employer]. Patient is being seen remotely via telehealth at home address in [place] and stated they are in a secure environment for this session. The patient's condition being diagnosed/treated is appropriate for telemedicine. The provider identified herself as well as her credentials. The patient, and/or patients guardian, consent to be seen remotely, and when consent is given they understand that the consent allows for patient identifiable information to be sent to a third party as needed. They may refuse to be seen remotely at any time. The electronic data is encrypted and password protected, and the patient and/or guardian has been advised of the potential risks to privacy not withstanding such measures.     You have chosen to receive care through a telehealth visit.  Do you consent to use a video/audio connection for your medical care today? Yes    PROGRESS NOTE  Data:  Hoa Lewis is a 33 y.o. female who presents today for follow up    Chief Complaint: grief, anxiety     History of Present Illness: Pt reports that her mood has been okay that she continues to process the loss of her father but has found writing in a journal to be helpful. Pt reports that her and her brother will be moving out of their apartment soon to move in with their mother. Pt reports that this will prevent their mother from being alone. Pt reports that she continues to hope that her mother will start therapy but understands this is something her mother will have to desire. Pt reports work has been a nice distraction as well as her pet cat. Pt reports that tending and caring for animals have always been helpful to her mental and emotional health; due to moving ZULMA letter has  been placed in [employer] this date.       Clinical Maneuvering/Intervention:    (Scales based on 0 - 10 with 10 being the worst)  Depression: 2 Anxiety: 3       Assisted patient in processing above session content; acknowledged and normalized patient’s thoughts, feelings, and concerns.  Rationalized patient thought process regarding recent stressors and life events. Discussed triggers associated with patient's emotions. Also discussed coping skills for patient to implement. Discussed grief and processing grief looks different for every one. ZULMA letter in [employer] this date.     Allowed patient to freely discuss issues without interruption or judgment. Provided safe, confidential environment to facilitate the development of positive therapeutic relationship and encourage open, honest communication. Assisted patient in identifying risk factors which would indicate the need for higher level of care including thoughts to harm self or others and/or self-harming behavior and encouraged patient to contact this office, call 911, or present to the nearest emergency room should any of these events occur. Discussed crisis intervention services and means to access. Patient adamantly and convincingly denies current suicidal or homicidal ideation or perceptual disturbance.    Assessment:   Assessment   Patient appears to maintain relative stability as compared to their baseline.  However, patient continues to struggle with anxiety and grief which continues to cause impairment in important areas of functioning.  A result, they can be reasonably expected to continue to benefit from treatment and would likely be at increased risk for decompensation otherwise.    Mental Status Exam:   Hygiene:   good  Cooperation:  Cooperative  Eye Contact:  Good  Psychomotor Behavior:  Appropriate  Affect:  Appropriate  Mood: sad  Speech:  Normal  Thought Process:  Linear  Thought Content:  Mood congruent  Suicidal:  None  Homicidal:   None  Hallucinations:  None  Delusion:  None  Memory:  Intact  Orientation:  Person, Place, Time and Situation  Reliability:  fair  Insight:  Fair  Judgement:  Fair  Impulse Control:  Fair  Physical/Medical Issues:  No        Patient's Support Network Includes:  mother and brother    Functional Status: Mild impairment     Progress toward goal: Not at goal    Prognosis: Fair with Ongoing Treatment            Plan:    Patient will continue in individual outpatient therapy with focus on improved functioning and coping skills, maintaining stability, and avoiding decompensation and the need for higher level of care.    Patient will adhere to medication regimen as prescribed and report any side effects. Patient will contact this office, call 911 or present to the nearest emergency room should suicidal or homicidal ideations occur. Provide Cognitive Behavioral Therapy and Solution Focused Therapy to improve functioning, maintain stability, and avoid decompensation and the need for higher level of care.     Return in about 4 weeks, or earlier if symptoms worsen or fail to improve.           VISIT DIAGNOSIS:     ICD-10-CM ICD-9-CM   1. Grief  F43.21 309.0   2. Generalized anxiety disorder  F41.1 300.02        Diagnoses and all orders for this visit:    1. Grief (Primary)    2. Generalized anxiety disorder           [facility] No Show Policy:  We understand unexpected circumstances arise; however, anytime you miss your appointment we are unable to provide you appropriate care.  In addition, each appointment missed could have been used to provide care for others.  We ask that you call at least 24 hours in advance to cancel or reschedule an appointment.  We would like to take this opportunity to remind you of our policy stating patients who miss THREE or more appointments without cancelling or rescheduling 24 hours in advance of the appointment may be subject to cancellation of any further visits with  our clinic and recommendation to seek in-person services/visits.    Please call [phone] to reschedule your appointment. If there are reasons that make it difficult for you to keep the appointments, please call and let us know how we can help.  Please understand that medication prescribing will not continue without seeing your provider.      [facility]'s No Show Policy reviewed with patient at today's visit. Patient verbalized understanding of this policy. Discussed with patient that in the event that there are three or more no show visits, it will be recommended that they pursue in-person services/visits as noncompliance with telehealth visits indicates that patient is not an appropriate candidate for telemedicine and would likely be more appropriate for in-person services/visits. Patient verbalizes understanding and is agreeable to this.        This document has been electronically signed by Hoa Garcia LCSW.  January 31, 2024 09:09 EST      Part of this note may be an electronic transcription/translation of spoken language to printed text using the Dragon Dictation System.

## 2024-02-28 ENCOUNTER — TELEMEDICINE (OUTPATIENT)
Dept: PSYCHIATRY | Facility: CLINIC | Age: 34
End: 2024-02-28
Payer: COMMERCIAL

## 2024-02-28 DIAGNOSIS — F43.21 GRIEF: Primary | ICD-10-CM

## 2024-02-28 NOTE — PROGRESS NOTES
Date: February 28, 2024  Time In: 0830  Time Out: 0846  This provider is located at home address for [facility] (through [facility]), [address], [place] [address] using a secure [employer] Video Visit through [employer]. Patient is being seen remotely via telehealth at home address in [place] and stated they are in a secure environment for this session. The patient's condition being diagnosed/treated is appropriate for telemedicine. The provider identified herself as well as her credentials. The patient, and/or patients guardian, consent to be seen remotely, and when consent is given they understand that the consent allows for patient identifiable information to be sent to a third party as needed. They may refuse to be seen remotely at any time. The electronic data is encrypted and password protected, and the patient and/or guardian has been advised of the potential risks to privacy not withstanding such measures.     You have chosen to receive care through a telehealth visit.  Do you consent to use a video/audio connection for your medical care today? Yes    PROGRESS NOTE  Data:  Hoa Lewis is a 33 y.o. female who presents today for follow up    Chief Complaint: grief    History of Present Illness: Pt reports that her pet cat of 15 years had to be put down. Pt reports that she noticed behavioral changes and upon arriving at the vet was informed of a large mass that would require surgery and removal of cat's jaw. Pt reports that she believed it was the best decision to have her pet cat put down rather than put her through a difficult surgery at her age. Pt reports that she is getting her cremated and will receive her ashes tomorrow. Pt reports that she is feeling numb at this time and disbelief. Pt reports that her cat was the only stable and consistent factor in her life and helped her greatly through the loss of Pt's father.        Clinical  Maneuvering/Intervention:    (Scales based on 0 - 10 with 10 being the worst)  Depression: 8 Anxiety: 8       Assisted patient in processing above session content; acknowledged and normalized patient’s thoughts, feelings, and concerns.  Rationalized patient thought process regarding recent stressors and life events. Discussed triggers associated with patient's emotions. Also discussed coping skills for patient to implement. Discussed grief and adjusting through multiple losses. Discussed importance of countering isolation and encouraged self care methods.     Allowed patient to freely discuss issues without interruption or judgment. Provided safe, confidential environment to facilitate the development of positive therapeutic relationship and encourage open, honest communication. Assisted patient in identifying risk factors which would indicate the need for higher level of care including thoughts to harm self or others and/or self-harming behavior and encouraged patient to contact this office, call 911, or present to the nearest emergency room should any of these events occur. Discussed crisis intervention services and means to access. Patient adamantly and convincingly denies current suicidal or homicidal ideation or perceptual disturbance.    Assessment:   Assessment   Patient appears to maintain relative stability as compared to their baseline.  However, patient continues to struggle with grief which continues to cause impairment in important areas of functioning.  A result, they can be reasonably expected to continue to benefit from treatment and would likely be at increased risk for decompensation otherwise.    Mental Status Exam:   Hygiene:   good  Cooperation:  Cooperative  Eye Contact:  Good  Psychomotor Behavior:  Appropriate  Affect:  Appropriate  Mood: sad and depressed  Speech:  Normal  Thought Process:  Linear  Thought Content:  Mood congruent  Suicidal:  None  Homicidal:  None  Hallucinations:   None  Delusion:  None  Memory:  Intact  Orientation:  Person, Place, Time and Situation  Reliability:  fair  Insight:  Fair  Judgement:  Fair  Impulse Control:  Fair  Physical/Medical Issues:  No        Patient's Support Network Includes:  mother    Functional Status: Mild impairment     Progress toward goal: Not at goal    Prognosis: Fair with Ongoing Treatment            Plan:    Patient will continue in individual outpatient therapy with focus on improved functioning and coping skills, maintaining stability, and avoiding decompensation and the need for higher level of care.    Patient will adhere to medication regimen as prescribed and report any side effects. Patient will contact this office, call 911 or present to the nearest emergency room should suicidal or homicidal ideations occur. Provide Cognitive Behavioral Therapy and Solution Focused Therapy to improve functioning, maintain stability, and avoid decompensation and the need for higher level of care.     Return in about 6 weeks, or earlier if symptoms worsen or fail to improve.           VISIT DIAGNOSIS:     ICD-10-CM ICD-9-CM   1. Grief  F43.21 309.0        Diagnoses and all orders for this visit:    1. Grief (Primary)           [facility] No Show Policy:  We understand unexpected circumstances arise; however, anytime you miss your appointment we are unable to provide you appropriate care.  In addition, each appointment missed could have been used to provide care for others.  We ask that you call at least 24 hours in advance to cancel or reschedule an appointment.  We would like to take this opportunity to remind you of our policy stating patients who miss THREE or more appointments without cancelling or rescheduling 24 hours in advance of the appointment may be subject to cancellation of any further visits with our clinic and recommendation to seek in-person services/visits.    Please call [phone] to reschedule your  appointment. If there are reasons that make it difficult for you to keep the appointments, please call and let us know how we can help.  Please understand that medication prescribing will not continue without seeing your provider.      [facility]'s No Show Policy reviewed with patient at today's visit. Patient verbalized understanding of this policy. Discussed with patient that in the event that there are three or more no show visits, it will be recommended that they pursue in-person services/visits as noncompliance with telehealth visits indicates that patient is not an appropriate candidate for telemedicine and would likely be more appropriate for in-person services/visits. Patient verbalizes understanding and is agreeable to this.        This document has been electronically signed by Hoa Garcia LCSW.  February 28, 2024 09:01 EST      Part of this note may be an electronic transcription/translation of spoken language to printed text using the Dragon Dictation System.

## 2024-03-06 ENCOUNTER — TELEMEDICINE (OUTPATIENT)
Dept: PSYCHIATRY | Facility: CLINIC | Age: 34
End: 2024-03-06
Payer: COMMERCIAL

## 2024-03-06 DIAGNOSIS — F33.41 RECURRENT MAJOR DEPRESSIVE DISORDER, IN PARTIAL REMISSION: ICD-10-CM

## 2024-03-06 DIAGNOSIS — F41.1 GENERALIZED ANXIETY DISORDER: Chronic | ICD-10-CM

## 2024-03-06 DIAGNOSIS — F43.21 GRIEF: Primary | ICD-10-CM

## 2024-03-06 PROCEDURE — 99213 OFFICE O/P EST LOW 20 MIN: CPT | Performed by: NURSE PRACTITIONER

## 2024-03-06 RX ORDER — BUPROPION HYDROCHLORIDE 300 MG/1
300 TABLET ORAL EVERY MORNING
Qty: 90 TABLET | Refills: 0 | Status: SHIPPED | OUTPATIENT
Start: 2024-03-06

## 2024-03-06 RX ORDER — SERTRALINE HYDROCHLORIDE 100 MG/1
100 TABLET, FILM COATED ORAL DAILY
Qty: 90 TABLET | Refills: 0 | Status: SHIPPED | OUTPATIENT
Start: 2024-03-06

## 2024-03-06 NOTE — PROGRESS NOTES
[facility] [address] [address] [place] [address].The Patient is seen remotely at home,  [place] [address] [facility]  Patient is being seen via telehealth and confirm that they are in a secure environment for this session. The patient's condition being diagnosed/treated is appropriate for telemedicine. The provider identified himself/herself: herself as well as her credentials.   The patient gave consent to be seen remotely, and when consent is given they understand that the consent allows for patient identifiable information to be sent to a third party as needed.   They may refuse to be seen remotely at any time. The electronic data is encrypted and password protected, and the patient has been advised of the potential risks to privacy not withstanding such measures.    You have chosen to receive care through a telehealth visit.  Do you consent to use a video/audio connection for your medical care today? Yes. Patient verified Name, , and address.       Chief Complaint  Anxiety and depression and grief     Subjective    Hoa Abril Lewis presents to [facility] medication management.     History of Present Illness   Patient presents today reporting that she is doing okay however she states she has felt down and sad lately because her cat of 15 years passed away.  Patient states it is no longer suffering but she had it since it was born and so it has been difficult.  Reports otherwise she has been doing pretty good as they moved into their new place and have been settled in.  She states it has a fenced in the backyard so she is hoping she can get outside more implant things.  Reports work is going well.  States she is sleeping and eating well.  Denies any major depressive symptoms other than feeling sad over her cat.  Patient states anxiety has been nothing major.  Reports therapy is going well.   Denies any new medical or medicine changes.  Denies any side effects to medications.  Denies any SI/HI/AH/VH.        Objective   Vital Signs:   There were no vitals taken for this visit.  Due to the remote nature of this encounter (virtual encounter), vitals were unable to be obtained.  Height stated at 67.9 inches.  Weight stated at 265 pounds.        PHQ-9 Score:   PHQ-9 Total Score:       Patient screened positive for depression based on a PHQ-9 score of  on . Follow-up recommendations include:  see notes and medication list .    PHQ-9 Depression Screening  Little interest or pleasure in doing things? (P) 2-->more than half the days   Feeling down, depressed, or hopeless? (P) 1-->several days   Trouble falling or staying asleep, or sleeping too much? (P) 0-->not at all   Feeling tired or having little energy? (P) 1-->several days   Poor appetite or overeating? (P) 0-->not at all   Feeling bad about yourself - or that you are a failure or have let yourself or your family down? (P) 0-->not at all   Trouble concentrating on things, such as reading the newspaper or watching television? (P) 1-->several days   Moving or speaking so slowly that other people could have noticed? Or the opposite - being so fidgety or restless that you have been moving around a lot more than usual? (P) 1-->several days   Thoughts that you would be better off dead, or of hurting yourself in some way? (P) 0-->not at all   PHQ-9 Total Score (P) 6   If you checked off any problems, how difficult have these problems made it for you to do your work, take care of things at home, or get along with other people? (P) not difficult at all       PHQ-9 Total Score: (P) 6           PROMIS scale screening tool that patient filled out virtually reviewed by this APRN at today's encounter.      Mental Status Exam:   Hygiene:   good  Cooperation:  Cooperative  Eye Contact:  Fair  Psychomotor Behavior:  Restless  Affect:  Appropriate  Mood: sad  Speech:   Normal  Thought Process:  Goal directed  Thought Content:  Normal  Suicidal:  None  Homicidal:  None  Hallucinations:  None  Delusion:  None  Memory:  Intact  Orientation:  Person, Place, Time and Situation  Reliability:  good  Insight:  Good and Fair  Judgement:  Good and Fair  Impulse Control:  Good and Fair  Physical/Medical Issues:  No      Current Medications:   Current Outpatient Medications   Medication Sig Dispense Refill    buPROPion XL (WELLBUTRIN XL) 300 MG 24 hr tablet Take 1 tablet by mouth Every Morning. 90 tablet 0    sertraline (ZOLOFT) 100 MG tablet Take 1 tablet by mouth Daily. 90 tablet 0    multivitamin with minerals tablet tablet Take 1 tablet by mouth Daily.      norgestimate-ethinyl estradiol (Tri-Lo-Pat) 0.18/0.215/0.25 MG-25 MCG per tablet TAKE 1 TABLET BY MOUTH DAILY 84 tablet 1    SEMAGLUTIDE-WEIGHT MANAGEMENT SC        No current facility-administered medications for this visit.       Physical Exam  Nursing note reviewed.   Constitutional:       Appearance: Normal appearance.   Neurological:      Mental Status: She is alert.   Psychiatric:         Attention and Perception: Attention and perception normal.         Mood and Affect: Mood and affect normal. Mood is depressed. Mood is not anxious. Affect is not tearful.         Speech: Speech normal.         Behavior: Behavior is cooperative.         Thought Content: Thought content normal.         Cognition and Memory: Cognition and memory normal.         Judgment: Judgment normal.        Result Review :     The following data was reviewed by: SHARATH Cornell on 03/17/2022:              CBC w/diff      CBC w/Diff 8/27/21 9/7/21   WBC 8.50 7.15   RBC 4.09 4.41   Hemoglobin 10.8 (A) 11.6 (A)   Hematocrit 34.6 35.9   MCV 84.6 81.4   MCH 26.4 (A) 26.3 (A)   MCHC 31.2 (A) 32.3   RDW 13.9 13.5   Platelets 332 359   Neutrophil Rel % 58.8 54.7   Immature Granulocyte Rel % 0.7 (A) 0.6 (A)   Lymphocyte Rel % 32.1 35.2   Monocyte Rel % 5.8 6.7    Eosinophil Rel % 1.8 1.8   Basophil Rel % 0.8 1.0   (A) Abnormal value                                    Data reviewed: PCP notes         Assessment and Plan    Problem List Items Addressed This Visit    None  Visit Diagnoses       Grief    -  Primary    Relevant Medications    buPROPion XL (WELLBUTRIN XL) 300 MG 24 hr tablet    Recurrent major depressive disorder, in partial remission        Relevant Medications    buPROPion XL (WELLBUTRIN XL) 300 MG 24 hr tablet    sertraline (ZOLOFT) 100 MG tablet    Generalized anxiety disorder  (Chronic)       Relevant Medications    buPROPion XL (WELLBUTRIN XL) 300 MG 24 hr tablet    sertraline (ZOLOFT) 100 MG tablet                TREATMENT PLAN/GOALS: Continue supportive psychotherapy efforts and medications as indicated. Treatment and medication options discussed during today's visit. Patient ackowledged and verbally consented to continue with current treatment plan and was educated on the importance of compliance with treatment and follow-up appointments.    MEDICATION ISSUES:  We discussed risks, benefits, and side effects of the above medications and the patient was agreeable with the plan. Patient was educated on the importance of compliance with treatment and follow-up appointments.  Patient is agreeable to call the office with any worsening of symptoms or onset of side effects. Patient is agreeable to call 911 or go to the nearest ER should he/she begin having SI/HI. Patient was strongly encouraged to continue birth control.  Patient was counseled regarding need not to become pregnant prior to discussion and possible titration and discontinuation of medications.  An explanation was provided to the patient regarding the risk of fetal harm with psychotrophic medications.  Patient was provided education regarding both risk of continuing and discontinuing medications during pregnancy.  Patient verbalized understanding.        -Continue Wellbutrin 300 mg XL daily as  adjunct for depression due to grief with the loss of her father.  Highly encouraged patient if she had any worsening symptoms or concerns to contact the clinic for sooner appointment she verbalized understanding.  -Continue sertraline 100 mg for depressive and anxiety symptoms.   -Continue psychotherapy with Hoa Bettencourt. Encouraged patient if she needs more frequent appointments to call the clinic she verbalized understanding.       Counseled patient regarding multimodal approach with healthy nutrition, healthy sleep, regular physical activity, social activities, counseling, and medications.      Coping skills reviewed and encouraged positive framing of thoughts     Assisted patient in processing above session content; acknowledged and normalized patient’s thoughts, feelings, and concerns.  Applied  positive coping skills and behavior management in session.  Allowed patient to freely discuss issues without interruption or judgment. Provided safe, confidential environment to facilitate the development of positive therapeutic relationship and encourage open, honest communication. Assisted patient in identifying risk factors which would indicate the need for higher level of care including thoughts to harm self or others and/or self-harming behavior and encouraged patient to contact this office, call 911, or present to the nearest emergency room should any of these events occur. Discussed crisis intervention services and means to access.     MEDS ORDERED DURING VISIT:  New Medications Ordered This Visit   Medications    buPROPion XL (WELLBUTRIN XL) 300 MG 24 hr tablet     Sig: Take 1 tablet by mouth Every Morning.     Dispense:  90 tablet     Refill:  0    sertraline (ZOLOFT) 100 MG tablet     Sig: Take 1 tablet by mouth Daily.     Dispense:  90 tablet     Refill:  0         Follow Up   Return in about 3 months (around 6/6/2024), or if symptoms worsen or fail to improve, for Recheck.  Encouraged patient if she had any  worsening symptoms or concerns to contact the clinic for sooner appointment she verbalized understanding.    Patient was given instructions and counseling regarding her condition or for health maintenance advice. Please see specific information pulled into the AVS if appropriate.     Some of the data in this electronic note has been brought forward from a previous encounter, any necessary changes have been made, it has been reviewed by this APRN, and it is accurate.      This document has been electronically signed by SHARATH Cornell  March 6, 2024 08:35 EST    Part of this note may be an electronic transcription/translation of spoken language to printed text using the Dragon Dictation System.

## 2024-07-15 ENCOUNTER — READMISSION MANAGEMENT (OUTPATIENT)
Dept: CALL CENTER | Facility: HOSPITAL | Age: 34
End: 2024-07-15
Payer: COMMERCIAL

## 2024-07-15 NOTE — OUTREACH NOTE
Prep Survey      Flowsheet Row Responses   [facility] facility patient discharged from? Non-[facility]   Is LACE score < 7 ? Non-BH Discharge   Eligibility [facility]   Date of Admission 07/12/24   Date of Discharge 07/15/24   Discharge Disposition Home or Self Care   Discharge diagnosis Cholecystitis, lap choley   Does the patient have one of the following disease processes/diagnoses(primary or secondary)? General Surgery   Prep survey completed? Yes            Sara WOODARD - Registered Nurse

## 2024-07-16 ENCOUNTER — TRANSITIONAL CARE MANAGEMENT TELEPHONE ENCOUNTER (OUTPATIENT)
Dept: CALL CENTER | Facility: HOSPITAL | Age: 34
End: 2024-07-16
Payer: COMMERCIAL

## 2024-07-16 NOTE — OUTREACH NOTE
Call Center TCM Note      Flowsheet Row Responses   [facility] patient discharged from? Non-  [[place]]   Does the patient have one of the following disease processes/diagnoses(primary or secondary)? General Surgery  [cholecystectomy]   TCM attempt successful? Yes   Call start time 0920   Call end time 0921   Discharge diagnosis Cholecystitis, lap cholecystectomy   Is the patient taking all medications as directed (includes completed medication regime)? Yes   Does the patient have an appointment with their PCP within 7-14 days of discharge? No   Nursing Interventions Patient desires to follow up with specialty only, Patient declined scheduling/rescheduling appointment at this time   Has home health visited the patient within 72 hours of discharge? N/A   Psychosocial issues? No   What is the patient's perception of their health status since discharge? Improving   Is the patient/caregiver able to teach back signs and symptoms related to disease process for when to call PCP? Yes   Is the patient/caregiver able to teach back signs and symptoms related to disease process for when to call 911? Yes   Is the patient/caregiver able to teach back the hierarchy of who to call/visit for symptoms/problems? PCP, Specialist, Home health nurse, Urgent Care, ED, 911 Yes   TCM call completed? Yes   Wrap up additional comments Doing well, no questions, declined to schedule a hospital follow up appt with PCP during TCM call and states she will be seeing her surgeon for a follow up.   Call end time 0921   Would this patient benefit from a Referral to Amb Social Work? No   Is the patient interested in additional calls from an ambulatory ? No            Ida Orozco RN    7/16/2024, 09:22 EDT